# Patient Record
Sex: MALE | Race: WHITE | Employment: OTHER | URBAN - NONMETROPOLITAN AREA
[De-identification: names, ages, dates, MRNs, and addresses within clinical notes are randomized per-mention and may not be internally consistent; named-entity substitution may affect disease eponyms.]

---

## 2017-01-03 DIAGNOSIS — Z95.810 AICD (AUTOMATIC CARDIOVERTER/DEFIBRILLATOR) PRESENT: Primary | ICD-10-CM

## 2017-01-03 DIAGNOSIS — I47.20 VT (VENTRICULAR TACHYCARDIA): ICD-10-CM

## 2017-01-03 PROCEDURE — 93296 REM INTERROG EVL PM/IDS: CPT | Performed by: CLINICAL NURSE SPECIALIST

## 2017-01-03 PROCEDURE — 93295 DEV INTERROG REMOTE 1/2/MLT: CPT | Performed by: CLINICAL NURSE SPECIALIST

## 2017-03-16 ENCOUNTER — APPOINTMENT (OUTPATIENT)
Dept: VASCULAR LAB | Age: 77
End: 2017-03-16
Payer: MEDICARE

## 2017-04-05 DIAGNOSIS — I47.20 VT (VENTRICULAR TACHYCARDIA): ICD-10-CM

## 2017-04-05 DIAGNOSIS — Z95.810 AICD (AUTOMATIC CARDIOVERTER/DEFIBRILLATOR) PRESENT: Primary | ICD-10-CM

## 2017-04-05 PROCEDURE — 93295 DEV INTERROG REMOTE 1/2/MLT: CPT | Performed by: CLINICAL NURSE SPECIALIST

## 2017-04-05 PROCEDURE — 93296 REM INTERROG EVL PM/IDS: CPT | Performed by: CLINICAL NURSE SPECIALIST

## 2017-04-06 ENCOUNTER — APPOINTMENT (OUTPATIENT)
Dept: CT IMAGING | Age: 77
End: 2017-04-06
Payer: MEDICARE

## 2017-04-06 ENCOUNTER — HOSPITAL ENCOUNTER (EMERGENCY)
Age: 77
Discharge: HOME | End: 2017-04-06
Attending: EMERGENCY MEDICINE
Payer: MEDICARE

## 2017-04-06 ENCOUNTER — APPOINTMENT (OUTPATIENT)
Dept: GENERAL RADIOLOGY | Age: 77
End: 2017-04-06
Payer: MEDICARE

## 2017-04-06 VITALS
WEIGHT: 195 LBS | HEART RATE: 59 BPM | TEMPERATURE: 97.4 F | OXYGEN SATURATION: 93 % | HEIGHT: 72 IN | SYSTOLIC BLOOD PRESSURE: 183 MMHG | RESPIRATION RATE: 20 BRPM | DIASTOLIC BLOOD PRESSURE: 61 MMHG | BODY MASS INDEX: 26.41 KG/M2

## 2017-04-06 DIAGNOSIS — R53.83 FATIGUE, UNSPECIFIED TYPE: ICD-10-CM

## 2017-04-06 DIAGNOSIS — R55 SYNCOPE, UNSPECIFIED SYNCOPE TYPE: Primary | ICD-10-CM

## 2017-04-06 DIAGNOSIS — E86.0 DEHYDRATION: ICD-10-CM

## 2017-04-06 LAB
ALBUMIN SERPL-MCNC: 4.3 G/DL (ref 3.5–5.2)
ALP BLD-CCNC: 42 U/L (ref 40–130)
ALT SERPL-CCNC: 28 U/L (ref 5–41)
ANION GAP SERPL CALCULATED.3IONS-SCNC: 11 MMOL/L (ref 7–19)
AST SERPL-CCNC: 27 U/L (ref 5–40)
BASOPHILS ABSOLUTE: 0 K/UL (ref 0–0.2)
BASOPHILS RELATIVE PERCENT: 0.3 % (ref 0–1)
BILIRUB SERPL-MCNC: 0.4 MG/DL (ref 0.2–1.2)
BILIRUBIN URINE: NEGATIVE
BLOOD, URINE: NEGATIVE
BUN BLDV-MCNC: 23 MG/DL (ref 8–23)
CALCIUM SERPL-MCNC: 8.8 MG/DL (ref 8.8–10.2)
CHLORIDE BLD-SCNC: 98 MMOL/L (ref 98–111)
CLARITY: CLEAR
CO2: 26 MMOL/L (ref 22–29)
COLOR: YELLOW
CREAT SERPL-MCNC: 1.3 MG/DL (ref 0.5–1.2)
EOSINOPHILS ABSOLUTE: 0.1 K/UL (ref 0–0.6)
EOSINOPHILS RELATIVE PERCENT: 1.1 % (ref 0–5)
GFR NON-AFRICAN AMERICAN: 54
GLOBULIN: 2.3 G/DL
GLUCOSE BLD-MCNC: 145 MG/DL (ref 74–109)
GLUCOSE URINE: NEGATIVE MG/DL
HCT VFR BLD CALC: 36.9 % (ref 42–52)
HEMOGLOBIN: 11.7 G/DL (ref 14–18)
INR BLD: 1.07 (ref 0.88–1.18)
KETONES, URINE: NEGATIVE MG/DL
LEUKOCYTE ESTERASE, URINE: NEGATIVE
LYMPHOCYTES ABSOLUTE: 1.4 K/UL (ref 1.1–4.5)
LYMPHOCYTES RELATIVE PERCENT: 13.7 % (ref 20–40)
MCH RBC QN AUTO: 27.9 PG (ref 27–31)
MCHC RBC AUTO-ENTMCNC: 31.7 G/DL (ref 33–37)
MCV RBC AUTO: 88.1 FL (ref 80–94)
MONOCYTES ABSOLUTE: 1.1 K/UL (ref 0–0.9)
MONOCYTES RELATIVE PERCENT: 10.9 % (ref 0–10)
NEUTROPHILS ABSOLUTE: 7.5 K/UL (ref 1.5–7.5)
NEUTROPHILS RELATIVE PERCENT: 72.4 % (ref 50–65)
NITRITE, URINE: NEGATIVE
PDW BLD-RTO: 15.2 % (ref 11.5–14.5)
PERFORMED ON: NORMAL
PH UA: 6.5
PLATELET # BLD: 194 K/UL (ref 130–400)
PMV BLD AUTO: 9.8 FL (ref 7.4–10.4)
POC TROPONIN I: 0 NG/ML (ref 0–0.08)
POTASSIUM SERPL-SCNC: 4.4 MMOL/L (ref 3.5–5)
PROTEIN UA: NEGATIVE MG/DL
PROTHROMBIN TIME: 13.9 SEC (ref 12–14.6)
RBC # BLD: 4.19 M/UL (ref 4.7–6.1)
SODIUM BLD-SCNC: 135 MMOL/L (ref 136–145)
SPECIFIC GRAVITY UA: 1.02
TOTAL PROTEIN: 6.6 G/DL (ref 6.6–8.7)
TROPONIN: <0.01 NG/ML (ref 0–0.03)
UROBILINOGEN, URINE: 0.2 E.U./DL
WBC # BLD: 10.3 K/UL (ref 4.8–10.8)

## 2017-04-06 PROCEDURE — 2580000003 HC RX 258: Performed by: EMERGENCY MEDICINE

## 2017-04-06 PROCEDURE — 99284 EMERGENCY DEPT VISIT MOD MDM: CPT | Performed by: EMERGENCY MEDICINE

## 2017-04-06 PROCEDURE — 99284 EMERGENCY DEPT VISIT MOD MDM: CPT

## 2017-04-06 PROCEDURE — 85025 COMPLETE CBC W/AUTO DIFF WBC: CPT

## 2017-04-06 PROCEDURE — 36415 COLL VENOUS BLD VENIPUNCTURE: CPT

## 2017-04-06 PROCEDURE — 71010 XR CHEST PORTABLE: CPT

## 2017-04-06 PROCEDURE — 81003 URINALYSIS AUTO W/O SCOPE: CPT

## 2017-04-06 PROCEDURE — 85610 PROTHROMBIN TIME: CPT

## 2017-04-06 PROCEDURE — 70450 CT HEAD/BRAIN W/O DYE: CPT

## 2017-04-06 PROCEDURE — 84484 ASSAY OF TROPONIN QUANT: CPT

## 2017-04-06 PROCEDURE — 80053 COMPREHEN METABOLIC PANEL: CPT

## 2017-04-06 PROCEDURE — 93005 ELECTROCARDIOGRAM TRACING: CPT

## 2017-04-06 RX ORDER — 0.9 % SODIUM CHLORIDE 0.9 %
1000 INTRAVENOUS SOLUTION INTRAVENOUS ONCE
Status: COMPLETED | OUTPATIENT
Start: 2017-04-06 | End: 2017-04-06

## 2017-04-06 RX ADMIN — SODIUM CHLORIDE 1000 ML: 9 INJECTION, SOLUTION INTRAVENOUS at 15:04

## 2017-04-06 ASSESSMENT — ENCOUNTER SYMPTOMS
DIARRHEA: 0
ABDOMINAL PAIN: 0
EYES NEGATIVE: 1
WHEEZING: 0
COUGH: 0
VOMITING: 0
RHINORRHEA: 0
SHORTNESS OF BREATH: 0
NAUSEA: 1
SORE THROAT: 0
CHEST TIGHTNESS: 0

## 2017-04-11 ENCOUNTER — TELEPHONE (OUTPATIENT)
Dept: CARDIOLOGY | Age: 77
End: 2017-04-11

## 2017-04-12 LAB
EKG P AXIS: 105 DEGREES
EKG P-R INTERVAL: 220 MS
EKG Q-T INTERVAL: 514 MS
EKG QRS DURATION: 170 MS
EKG QTC CALCULATION (BAZETT): 504 MS
EKG T AXIS: 63 DEGREES

## 2017-04-20 ENCOUNTER — HOSPITAL ENCOUNTER (OUTPATIENT)
Dept: VASCULAR LAB | Age: 77
Discharge: HOME | End: 2017-04-20
Payer: MEDICARE

## 2017-04-20 ENCOUNTER — HOSPITAL ENCOUNTER (OUTPATIENT)
Dept: NEUROLOGY | Age: 77
Discharge: HOME | End: 2017-04-20
Payer: MEDICARE

## 2017-04-20 ENCOUNTER — OFFICE VISIT (OUTPATIENT)
Dept: VASCULAR SURGERY | Age: 77
End: 2017-04-20
Payer: MEDICARE

## 2017-04-20 VITALS
TEMPERATURE: 96.5 F | DIASTOLIC BLOOD PRESSURE: 80 MMHG | RESPIRATION RATE: 18 BRPM | HEART RATE: 65 BPM | SYSTOLIC BLOOD PRESSURE: 160 MMHG

## 2017-04-20 DIAGNOSIS — I65.23 BILATERAL CAROTID ARTERY STENOSIS: ICD-10-CM

## 2017-04-20 DIAGNOSIS — I70.213 ATHEROSCLEROSIS OF NATIVE ARTERY OF BOTH LOWER EXTREMITIES WITH INTERMITTENT CLAUDICATION (HCC): Primary | ICD-10-CM

## 2017-04-20 DIAGNOSIS — I70.213 ATHEROSCLEROSIS OF NATIVE ARTERY OF BOTH LOWER EXTREMITIES WITH INTERMITTENT CLAUDICATION (HCC): ICD-10-CM

## 2017-04-20 PROCEDURE — 95816 EEG AWAKE AND DROWSY: CPT

## 2017-04-20 PROCEDURE — 95816 EEG AWAKE AND DROWSY: CPT | Performed by: PSYCHIATRY & NEUROLOGY

## 2017-04-20 PROCEDURE — 93923 UPR/LXTR ART STDY 3+ LVLS: CPT

## 2017-04-20 PROCEDURE — 93880 EXTRACRANIAL BILAT STUDY: CPT

## 2017-04-20 PROCEDURE — 99212 OFFICE O/P EST SF 10 MIN: CPT | Performed by: NURSE PRACTITIONER

## 2017-04-21 PROBLEM — I65.29 CAROTID ARTERY STENOSIS: Status: ACTIVE | Noted: 2017-04-21

## 2017-04-25 ENCOUNTER — OFFICE VISIT (OUTPATIENT)
Dept: CARDIOLOGY | Age: 77
End: 2017-04-25
Payer: MEDICARE

## 2017-04-25 VITALS
DIASTOLIC BLOOD PRESSURE: 60 MMHG | SYSTOLIC BLOOD PRESSURE: 140 MMHG | HEIGHT: 72 IN | HEART RATE: 66 BPM | BODY MASS INDEX: 27.9 KG/M2 | WEIGHT: 206 LBS

## 2017-04-25 DIAGNOSIS — I25.10 CORONARY ARTERY DISEASE INVOLVING NATIVE CORONARY ARTERY OF NATIVE HEART WITHOUT ANGINA PECTORIS: Primary | ICD-10-CM

## 2017-04-25 DIAGNOSIS — K92.1 BLOOD IN STOOL: ICD-10-CM

## 2017-04-25 DIAGNOSIS — R53.83 FATIGUE, UNSPECIFIED TYPE: ICD-10-CM

## 2017-04-25 DIAGNOSIS — R06.02 SOB (SHORTNESS OF BREATH): ICD-10-CM

## 2017-04-25 DIAGNOSIS — Z92.29 HISTORY OF AMIODARONE THERAPY: ICD-10-CM

## 2017-04-25 DIAGNOSIS — D50.8 OTHER IRON DEFICIENCY ANEMIA: ICD-10-CM

## 2017-04-25 PROBLEM — D50.9 IRON DEFICIENCY ANEMIA: Status: ACTIVE | Noted: 2017-04-25

## 2017-04-25 PROCEDURE — 99214 OFFICE O/P EST MOD 30 MIN: CPT | Performed by: NURSE PRACTITIONER

## 2017-04-25 PROCEDURE — 93289 INTERROG DEVICE EVAL HEART: CPT | Performed by: NURSE PRACTITIONER

## 2017-04-26 ENCOUNTER — TELEPHONE (OUTPATIENT)
Dept: CARDIOLOGY | Age: 77
End: 2017-04-26

## 2017-05-05 ENCOUNTER — HOSPITAL ENCOUNTER (OUTPATIENT)
Dept: NUCLEAR MEDICINE | Age: 77
End: 2017-05-05
Payer: MEDICARE

## 2017-05-05 ENCOUNTER — HOSPITAL ENCOUNTER (OUTPATIENT)
Dept: NON INVASIVE DIAGNOSTICS | Age: 77
Discharge: HOME | End: 2017-05-05
Payer: MEDICARE

## 2017-05-05 ENCOUNTER — HOSPITAL ENCOUNTER (OUTPATIENT)
Dept: NUCLEAR MEDICINE | Age: 77
Discharge: HOME | End: 2017-05-05
Payer: MEDICARE

## 2017-05-05 DIAGNOSIS — R06.02 SOB (SHORTNESS OF BREATH): ICD-10-CM

## 2017-05-05 DIAGNOSIS — R53.83 FATIGUE, UNSPECIFIED TYPE: ICD-10-CM

## 2017-05-05 DIAGNOSIS — I25.10 CORONARY ARTERY DISEASE INVOLVING NATIVE CORONARY ARTERY OF NATIVE HEART WITHOUT ANGINA PECTORIS: ICD-10-CM

## 2017-05-05 PROCEDURE — 3430000000 HC RX DIAGNOSTIC RADIOPHARMACEUTICAL: Performed by: INTERNAL MEDICINE

## 2017-05-05 PROCEDURE — A9500 TC99M SESTAMIBI: HCPCS | Performed by: INTERNAL MEDICINE

## 2017-05-05 PROCEDURE — 93017 CV STRESS TEST TRACING ONLY: CPT

## 2017-05-05 PROCEDURE — 6360000002 HC RX W HCPCS: Performed by: INTERNAL MEDICINE

## 2017-05-05 PROCEDURE — 78452 HT MUSCLE IMAGE SPECT MULT: CPT

## 2017-05-05 RX ADMIN — TETRAKIS(2-METHOXYISOBUTYLISOCYANIDE)COPPER(I) TETRAFLUOROBORATE 30 MILLICURIE: 1 INJECTION, POWDER, LYOPHILIZED, FOR SOLUTION INTRAVENOUS at 11:42

## 2017-05-05 RX ADMIN — REGADENOSON 0.4 MG: 0.08 INJECTION, SOLUTION INTRAVENOUS at 11:42

## 2017-05-05 RX ADMIN — TETRAKIS(2-METHOXYISOBUTYLISOCYANIDE)COPPER(I) TETRAFLUOROBORATE 10 MILLICURIE: 1 INJECTION, POWDER, LYOPHILIZED, FOR SOLUTION INTRAVENOUS at 11:42

## 2017-05-09 ENCOUNTER — OFFICE VISIT (OUTPATIENT)
Dept: VASCULAR SURGERY | Age: 77
End: 2017-05-09
Payer: MEDICARE

## 2017-05-09 ENCOUNTER — OFFICE VISIT (OUTPATIENT)
Dept: CARDIOLOGY | Age: 77
End: 2017-05-09
Payer: MEDICARE

## 2017-05-09 ENCOUNTER — SURG/PROC ORDERS (OUTPATIENT)
Dept: CARDIOLOGY | Age: 77
End: 2017-05-09

## 2017-05-09 VITALS
SYSTOLIC BLOOD PRESSURE: 134 MMHG | HEIGHT: 72 IN | BODY MASS INDEX: 28.17 KG/M2 | WEIGHT: 208 LBS | DIASTOLIC BLOOD PRESSURE: 66 MMHG | HEART RATE: 64 BPM

## 2017-05-09 VITALS
DIASTOLIC BLOOD PRESSURE: 58 MMHG | TEMPERATURE: 98.5 F | RESPIRATION RATE: 18 BRPM | HEART RATE: 61 BPM | SYSTOLIC BLOOD PRESSURE: 138 MMHG | OXYGEN SATURATION: 91 %

## 2017-05-09 DIAGNOSIS — R93.1 ABNORMAL NUCLEAR CARDIAC IMAGING TEST: Primary | ICD-10-CM

## 2017-05-09 DIAGNOSIS — E78.00 HYPERCHOLESTEROLEMIA: ICD-10-CM

## 2017-05-09 DIAGNOSIS — R53.83 FATIGUE, UNSPECIFIED TYPE: ICD-10-CM

## 2017-05-09 DIAGNOSIS — I10 ESSENTIAL HYPERTENSION: ICD-10-CM

## 2017-05-09 DIAGNOSIS — K92.1 BLOOD IN STOOL: ICD-10-CM

## 2017-05-09 DIAGNOSIS — I48.91 ATRIAL FIBRILLATION, UNSPECIFIED TYPE (HCC): ICD-10-CM

## 2017-05-09 DIAGNOSIS — I70.229 ATHEROSCLEROSIS OF ARTERY OF EXTREMITY WITH REST PAIN (HCC): Primary | ICD-10-CM

## 2017-05-09 DIAGNOSIS — D50.8 OTHER IRON DEFICIENCY ANEMIA: ICD-10-CM

## 2017-05-09 DIAGNOSIS — I25.10 CORONARY ARTERY DISEASE INVOLVING NATIVE CORONARY ARTERY OF NATIVE HEART WITHOUT ANGINA PECTORIS: Primary | ICD-10-CM

## 2017-05-09 LAB
LV EF: 65 %
LVEF MODALITY: NORMAL

## 2017-05-09 PROCEDURE — 99212 OFFICE O/P EST SF 10 MIN: CPT | Performed by: INTERNAL MEDICINE

## 2017-05-09 PROCEDURE — 99213 OFFICE O/P EST LOW 20 MIN: CPT | Performed by: PHYSICIAN ASSISTANT

## 2017-05-09 RX ORDER — AMIODARONE HYDROCHLORIDE 200 MG/1
200 TABLET ORAL DAILY
Qty: 180 TABLET | Refills: 3 | Status: SHIPPED | OUTPATIENT
Start: 2017-05-09 | End: 2017-10-13 | Stop reason: SDUPTHER

## 2017-05-10 ENCOUNTER — TELEPHONE (OUTPATIENT)
Dept: VASCULAR SURGERY | Age: 77
End: 2017-05-10

## 2017-05-10 RX ORDER — ACETYLCYSTEINE 100 MG/ML
SOLUTION ORAL; RESPIRATORY (INHALATION)
Qty: 1 ML | Refills: 0 | OUTPATIENT
Start: 2017-05-10 | End: 2017-06-28 | Stop reason: CLARIF

## 2017-05-10 RX ORDER — SODIUM CHLORIDE 9 MG/ML
INJECTION, SOLUTION INTRAVENOUS CONTINUOUS
Status: CANCELLED | OUTPATIENT
Start: 2017-05-10

## 2017-05-15 ENCOUNTER — PREP FOR PROCEDURE (OUTPATIENT)
Dept: VASCULAR SURGERY | Age: 77
End: 2017-05-15

## 2017-05-15 ENCOUNTER — APPOINTMENT (OUTPATIENT)
Dept: GENERAL RADIOLOGY | Age: 77
End: 2017-05-15
Attending: INTERNAL MEDICINE
Payer: MEDICARE

## 2017-05-15 ENCOUNTER — HOSPITAL ENCOUNTER (OUTPATIENT)
Dept: CARDIAC CATH/INVASIVE PROCEDURES | Age: 77
Discharge: HOME | End: 2017-05-15
Attending: INTERNAL MEDICINE | Admitting: INTERNAL MEDICINE
Payer: MEDICARE

## 2017-05-15 VITALS
RESPIRATION RATE: 22 BRPM | DIASTOLIC BLOOD PRESSURE: 46 MMHG | SYSTOLIC BLOOD PRESSURE: 132 MMHG | TEMPERATURE: 97.9 F | BODY MASS INDEX: 27.09 KG/M2 | WEIGHT: 200 LBS | OXYGEN SATURATION: 92 % | HEIGHT: 72 IN | HEART RATE: 65 BPM

## 2017-05-15 DIAGNOSIS — R93.1 ABNORMAL NUCLEAR CARDIAC IMAGING TEST: ICD-10-CM

## 2017-05-15 LAB
ANION GAP SERPL CALCULATED.3IONS-SCNC: 13 MMOL/L (ref 7–19)
BUN BLDV-MCNC: 24 MG/DL (ref 8–23)
CALCIUM SERPL-MCNC: 9 MG/DL (ref 8.8–10.2)
CHLORIDE BLD-SCNC: 103 MMOL/L (ref 98–111)
CO2: 23 MMOL/L (ref 22–29)
CREAT SERPL-MCNC: 1.4 MG/DL (ref 0.5–1.2)
GFR NON-AFRICAN AMERICAN: 49
GLUCOSE BLD-MCNC: 113 MG/DL (ref 74–109)
HCT VFR BLD CALC: 37.7 % (ref 42–52)
HEMOGLOBIN: 11.8 G/DL (ref 14–18)
MCH RBC QN AUTO: 28 PG (ref 27–31)
MCHC RBC AUTO-ENTMCNC: 31.3 G/DL (ref 33–37)
MCV RBC AUTO: 89.5 FL (ref 80–94)
PDW BLD-RTO: 14.2 % (ref 11.5–14.5)
PLATELET # BLD: 235 K/UL (ref 130–400)
PMV BLD AUTO: 9.7 FL (ref 7.4–10.4)
POTASSIUM SERPL-SCNC: 4.3 MMOL/L (ref 3.5–5)
RBC # BLD: 4.21 M/UL (ref 4.7–6.1)
SODIUM BLD-SCNC: 139 MMOL/L (ref 136–145)
WBC # BLD: 13 K/UL (ref 4.8–10.8)

## 2017-05-15 PROCEDURE — 93459 L HRT ART/GRFT ANGIO: CPT | Performed by: INTERNAL MEDICINE

## 2017-05-15 PROCEDURE — 71020 XR CHEST STANDARD TWO VW: CPT

## 2017-05-15 PROCEDURE — 85027 COMPLETE CBC AUTOMATED: CPT

## 2017-05-15 PROCEDURE — 99024 POSTOP FOLLOW-UP VISIT: CPT | Performed by: INTERNAL MEDICINE

## 2017-05-15 PROCEDURE — 2580000003 HC RX 258: Performed by: INTERNAL MEDICINE

## 2017-05-15 PROCEDURE — 36415 COLL VENOUS BLD VENIPUNCTURE: CPT

## 2017-05-15 PROCEDURE — 80048 BASIC METABOLIC PNL TOTAL CA: CPT

## 2017-05-15 PROCEDURE — 6360000002 HC RX W HCPCS

## 2017-05-15 RX ORDER — SODIUM CHLORIDE 9 MG/ML
INJECTION, SOLUTION INTRAVENOUS CONTINUOUS
Status: CANCELLED | OUTPATIENT
Start: 2017-05-15

## 2017-05-15 RX ORDER — SODIUM CHLORIDE 9 MG/ML
INJECTION, SOLUTION INTRAVENOUS CONTINUOUS
Status: DISCONTINUED | OUTPATIENT
Start: 2017-05-15 | End: 2017-05-15

## 2017-05-15 RX ORDER — SODIUM CHLORIDE 9 MG/ML
INJECTION, SOLUTION INTRAVENOUS CONTINUOUS
Status: ACTIVE | OUTPATIENT
Start: 2017-05-15 | End: 2017-05-15

## 2017-05-15 RX ORDER — ASPIRIN 81 MG/1
81 TABLET ORAL ONCE
Status: CANCELLED | OUTPATIENT
Start: 2017-05-15 | End: 2017-05-15

## 2017-05-15 RX ORDER — ISOSORBIDE MONONITRATE 60 MG/1
60 TABLET, EXTENDED RELEASE ORAL DAILY
Qty: 30 TABLET | Refills: 3 | Status: SHIPPED | OUTPATIENT
Start: 2017-05-15 | End: 2017-09-11 | Stop reason: SDUPTHER

## 2017-05-15 RX ORDER — SODIUM CHLORIDE 0.9 % (FLUSH) 0.9 %
10 SYRINGE (ML) INJECTION PRN
Status: CANCELLED | OUTPATIENT
Start: 2017-05-15

## 2017-05-15 RX ADMIN — SODIUM CHLORIDE: 9 INJECTION, SOLUTION INTRAVENOUS at 11:17

## 2017-05-24 LAB
ALBUMIN SERPL-MCNC: 4.1 G/DL (ref 3.5–5.2)
ALP BLD-CCNC: 44 U/L (ref 40–130)
ALT SERPL-CCNC: 29 U/L (ref 5–41)
ANION GAP SERPL CALCULATED.3IONS-SCNC: 13 MMOL/L (ref 7–19)
AST SERPL-CCNC: 26 U/L (ref 5–40)
BASOPHILS ABSOLUTE: 0.1 K/UL (ref 0–0.2)
BASOPHILS RELATIVE PERCENT: 0.6 % (ref 0–1)
BILIRUB SERPL-MCNC: 0.3 MG/DL (ref 0.2–1.2)
BUN BLDV-MCNC: 25 MG/DL (ref 8–23)
CALCIUM SERPL-MCNC: 9.4 MG/DL (ref 8.8–10.2)
CHLORIDE BLD-SCNC: 105 MMOL/L (ref 98–111)
CO2: 24 MMOL/L (ref 22–29)
CREAT SERPL-MCNC: 1.4 MG/DL (ref 0.5–1.2)
EOSINOPHILS ABSOLUTE: 0.3 K/UL (ref 0–0.6)
EOSINOPHILS RELATIVE PERCENT: 2.6 % (ref 0–5)
GFR NON-AFRICAN AMERICAN: 49
GLUCOSE BLD-MCNC: 121 MG/DL (ref 74–109)
HCT VFR BLD CALC: 36.5 % (ref 42–52)
HEMOGLOBIN: 11.3 G/DL (ref 14–18)
LYMPHOCYTES ABSOLUTE: 2.1 K/UL (ref 1.1–4.5)
LYMPHOCYTES RELATIVE PERCENT: 20.9 % (ref 20–40)
MCH RBC QN AUTO: 27.6 PG (ref 27–31)
MCHC RBC AUTO-ENTMCNC: 31 G/DL (ref 33–37)
MCV RBC AUTO: 89 FL (ref 80–94)
MONOCYTES ABSOLUTE: 1.3 K/UL (ref 0–0.9)
MONOCYTES RELATIVE PERCENT: 13.3 % (ref 0–10)
NEUTROPHILS ABSOLUTE: 5.8 K/UL (ref 1.5–7.5)
NEUTROPHILS RELATIVE PERCENT: 59.1 % (ref 50–65)
PDW BLD-RTO: 14.1 % (ref 11.5–14.5)
PLATELET # BLD: 205 K/UL (ref 130–400)
PMV BLD AUTO: 9.2 FL (ref 7.4–10.4)
POTASSIUM SERPL-SCNC: 5 MMOL/L (ref 3.5–5)
RBC # BLD: 4.1 M/UL (ref 4.7–6.1)
SODIUM BLD-SCNC: 142 MMOL/L (ref 136–145)
T3 FREE: 3 PG/ML (ref 2–4.4)
T4 FREE: 1.9 NG/ML (ref 0.9–1.7)
TOTAL PROTEIN: 7.1 G/DL (ref 6.6–8.7)
TSH SERPL DL<=0.05 MIU/L-ACNC: 0.14 UIU/ML (ref 0.27–4.2)
WBC # BLD: 9.8 K/UL (ref 4.8–10.8)

## 2017-05-26 LAB — THYROID PEROXIDASE (TPO) ABS: 0.3 IU/ML (ref 0–9)

## 2017-06-02 RX ORDER — SODIUM CHLORIDE 9 MG/ML
INJECTION, SOLUTION INTRAVENOUS CONTINUOUS
Status: CANCELLED | OUTPATIENT
Start: 2017-06-02

## 2017-06-02 RX ORDER — SODIUM CHLORIDE 0.9 % (FLUSH) 0.9 %
10 SYRINGE (ML) INJECTION PRN
Status: CANCELLED | OUTPATIENT
Start: 2017-06-02

## 2017-06-02 RX ORDER — ASPIRIN 81 MG/1
81 TABLET ORAL ONCE
Status: CANCELLED | OUTPATIENT
Start: 2017-06-02 | End: 2017-06-02

## 2017-06-05 ENCOUNTER — HOSPITAL ENCOUNTER (OUTPATIENT)
Dept: INTERVENTIONAL RADIOLOGY/VASCULAR | Age: 77
Setting detail: OBSERVATION
Discharge: HOME | End: 2017-06-06
Attending: SURGERY | Admitting: SURGERY
Payer: MEDICARE

## 2017-06-05 DIAGNOSIS — I70.213 ATHEROSCLEROSIS OF NATIVE ARTERY OF BOTH LOWER EXTREMITIES WITH INTERMITTENT CLAUDICATION (HCC): ICD-10-CM

## 2017-06-05 LAB
HCT VFR BLD CALC: 33.7 % (ref 42–52)
HEMOGLOBIN: 10.6 G/DL (ref 14–18)

## 2017-06-05 PROCEDURE — 2500000003 HC RX 250 WO HCPCS: Performed by: SURGERY

## 2017-06-05 PROCEDURE — G0378 HOSPITAL OBSERVATION PER HR: HCPCS

## 2017-06-05 PROCEDURE — 75716 ARTERY X-RAYS ARMS/LEGS: CPT | Performed by: SURGERY

## 2017-06-05 PROCEDURE — 94640 AIRWAY INHALATION TREATMENT: CPT

## 2017-06-05 PROCEDURE — 6370000000 HC RX 637 (ALT 250 FOR IP): Performed by: SURGERY

## 2017-06-05 PROCEDURE — 6360000004 HC RX CONTRAST MEDICATION: Performed by: SURGERY

## 2017-06-05 PROCEDURE — C1769 GUIDE WIRE: HCPCS

## 2017-06-05 PROCEDURE — 85014 HEMATOCRIT: CPT

## 2017-06-05 PROCEDURE — 6360000002 HC RX W HCPCS: Performed by: SURGERY

## 2017-06-05 PROCEDURE — 93922 UPR/L XTREMITY ART 2 LEVELS: CPT

## 2017-06-05 PROCEDURE — 93005 ELECTROCARDIOGRAM TRACING: CPT

## 2017-06-05 PROCEDURE — 36415 COLL VENOUS BLD VENIPUNCTURE: CPT

## 2017-06-05 PROCEDURE — 75625 CONTRAST EXAM ABDOMINL AORTA: CPT | Performed by: SURGERY

## 2017-06-05 PROCEDURE — 37225 HC ATHERECTOMY FEM/POP: CPT | Performed by: SURGERY

## 2017-06-05 PROCEDURE — 2580000003 HC RX 258: Performed by: SURGERY

## 2017-06-05 PROCEDURE — 85018 HEMOGLOBIN: CPT

## 2017-06-05 PROCEDURE — 37225 PR REVSC OPN/PRQ FEM/POP W/ATHRC/ANGIOP SM VSL: CPT | Performed by: SURGERY

## 2017-06-05 RX ORDER — VALSARTAN 160 MG/1
160 TABLET ORAL NIGHTLY
Status: DISCONTINUED | OUTPATIENT
Start: 2017-06-05 | End: 2017-06-06 | Stop reason: HOSPADM

## 2017-06-05 RX ORDER — ASPIRIN 81 MG/1
81 TABLET ORAL ONCE
Status: DISCONTINUED | OUTPATIENT
Start: 2017-06-05 | End: 2017-06-05 | Stop reason: SDUPTHER

## 2017-06-05 RX ORDER — ONDANSETRON 2 MG/ML
4 INJECTION INTRAMUSCULAR; INTRAVENOUS EVERY 8 HOURS PRN
Status: DISCONTINUED | OUTPATIENT
Start: 2017-06-05 | End: 2017-06-06 | Stop reason: HOSPADM

## 2017-06-05 RX ORDER — ISOSORBIDE MONONITRATE 60 MG/1
60 TABLET, EXTENDED RELEASE ORAL DAILY
Status: DISCONTINUED | OUTPATIENT
Start: 2017-06-06 | End: 2017-06-06 | Stop reason: HOSPADM

## 2017-06-05 RX ORDER — MIDAZOLAM HYDROCHLORIDE 1 MG/ML
INJECTION INTRAMUSCULAR; INTRAVENOUS
Status: COMPLETED | OUTPATIENT
Start: 2017-06-05 | End: 2017-06-05

## 2017-06-05 RX ORDER — CLONIDINE HYDROCHLORIDE 0.1 MG/1
0.1 TABLET ORAL
Status: COMPLETED | OUTPATIENT
Start: 2017-06-05 | End: 2017-06-05

## 2017-06-05 RX ORDER — SODIUM CHLORIDE 0.9 % (FLUSH) 0.9 %
10 SYRINGE (ML) INJECTION PRN
Status: DISCONTINUED | OUTPATIENT
Start: 2017-06-05 | End: 2017-06-06 | Stop reason: HOSPADM

## 2017-06-05 RX ORDER — HYDROCODONE BITARTRATE AND ACETAMINOPHEN 5; 325 MG/1; MG/1
2 TABLET ORAL EVERY 4 HOURS PRN
Status: DISCONTINUED | OUTPATIENT
Start: 2017-06-05 | End: 2017-06-06 | Stop reason: HOSPADM

## 2017-06-05 RX ORDER — FERROUS SULFATE 325(65) MG
325 TABLET ORAL
COMMUNITY
End: 2017-10-13 | Stop reason: ALTCHOICE

## 2017-06-05 RX ORDER — HEPARIN SODIUM 5000 [USP'U]/ML
INJECTION, SOLUTION INTRAVENOUS; SUBCUTANEOUS
Status: COMPLETED | OUTPATIENT
Start: 2017-06-05 | End: 2017-06-05

## 2017-06-05 RX ORDER — IODIXANOL 320 MG/ML
INJECTION, SOLUTION INTRAVASCULAR
Status: COMPLETED | OUTPATIENT
Start: 2017-06-05 | End: 2017-06-05

## 2017-06-05 RX ORDER — ALBUTEROL SULFATE 90 UG/1
2 AEROSOL, METERED RESPIRATORY (INHALATION) EVERY 6 HOURS PRN
Status: DISCONTINUED | OUTPATIENT
Start: 2017-06-05 | End: 2017-06-06 | Stop reason: HOSPADM

## 2017-06-05 RX ORDER — ASPIRIN 81 MG/1
81 TABLET ORAL ONCE
Status: COMPLETED | OUTPATIENT
Start: 2017-06-05 | End: 2017-06-05

## 2017-06-05 RX ORDER — ACETAMINOPHEN 325 MG/1
650 TABLET ORAL EVERY 4 HOURS PRN
Status: DISCONTINUED | OUTPATIENT
Start: 2017-06-05 | End: 2017-06-06 | Stop reason: HOSPADM

## 2017-06-05 RX ORDER — VITAMIN B COMPLEX
1 CAPSULE ORAL DAILY
COMMUNITY
End: 2017-12-12

## 2017-06-05 RX ORDER — HYDROCODONE BITARTRATE AND ACETAMINOPHEN 5; 325 MG/1; MG/1
1 TABLET ORAL EVERY 4 HOURS PRN
Status: DISCONTINUED | OUTPATIENT
Start: 2017-06-05 | End: 2017-06-06 | Stop reason: HOSPADM

## 2017-06-05 RX ORDER — LIDOCAINE HYDROCHLORIDE 20 MG/ML
INJECTION, SOLUTION INFILTRATION; PERINEURAL
Status: COMPLETED | OUTPATIENT
Start: 2017-06-05 | End: 2017-06-05

## 2017-06-05 RX ORDER — ALBUTEROL SULFATE 2.5 MG/3ML
2.5 SOLUTION RESPIRATORY (INHALATION) EVERY 6 HOURS PRN
Status: DISCONTINUED | OUTPATIENT
Start: 2017-06-05 | End: 2017-06-06 | Stop reason: HOSPADM

## 2017-06-05 RX ORDER — SODIUM CHLORIDE 9 MG/ML
INJECTION, SOLUTION INTRAVENOUS CONTINUOUS
Status: DISCONTINUED | OUTPATIENT
Start: 2017-06-05 | End: 2017-06-06 | Stop reason: HOSPADM

## 2017-06-05 RX ORDER — SODIUM CHLORIDE 9 MG/ML
INJECTION, SOLUTION INTRAVENOUS CONTINUOUS
Status: DISCONTINUED | OUTPATIENT
Start: 2017-06-05 | End: 2017-06-05 | Stop reason: SDUPTHER

## 2017-06-05 RX ORDER — PREDNISONE 10 MG/1
5 TABLET ORAL EVERY OTHER DAY
Status: DISCONTINUED | OUTPATIENT
Start: 2017-06-06 | End: 2017-06-06 | Stop reason: HOSPADM

## 2017-06-05 RX ORDER — SODIUM CHLORIDE 0.9 % (FLUSH) 0.9 %
10 SYRINGE (ML) INJECTION PRN
Status: DISCONTINUED | OUTPATIENT
Start: 2017-06-05 | End: 2017-06-05 | Stop reason: SDUPTHER

## 2017-06-05 RX ORDER — ACETYLCYSTEINE 200 MG/ML
3 SOLUTION ORAL; RESPIRATORY (INHALATION) ONCE
Status: COMPLETED | OUTPATIENT
Start: 2017-06-06 | End: 2017-06-06

## 2017-06-05 RX ORDER — NITROGLYCERIN 20 MG/100ML
INJECTION INTRAVENOUS CONTINUOUS PRN
Status: COMPLETED | OUTPATIENT
Start: 2017-06-05 | End: 2017-06-05

## 2017-06-05 RX ORDER — VERAPAMIL HYDROCHLORIDE 2.5 MG/ML
INJECTION, SOLUTION INTRAVENOUS
Status: COMPLETED | OUTPATIENT
Start: 2017-06-05 | End: 2017-06-05

## 2017-06-05 RX ORDER — ATORVASTATIN CALCIUM 40 MG/1
40 TABLET, FILM COATED ORAL DAILY
Status: DISCONTINUED | OUTPATIENT
Start: 2017-06-06 | End: 2017-06-06 | Stop reason: HOSPADM

## 2017-06-05 RX ORDER — FENTANYL CITRATE 50 UG/ML
INJECTION, SOLUTION INTRAMUSCULAR; INTRAVENOUS
Status: COMPLETED | OUTPATIENT
Start: 2017-06-05 | End: 2017-06-05

## 2017-06-05 RX ORDER — AMIODARONE HYDROCHLORIDE 200 MG/1
200 TABLET ORAL DAILY
Status: DISCONTINUED | OUTPATIENT
Start: 2017-06-06 | End: 2017-06-06 | Stop reason: HOSPADM

## 2017-06-05 RX ORDER — FERROUS SULFATE 325(65) MG
325 TABLET ORAL
Status: DISCONTINUED | OUTPATIENT
Start: 2017-06-06 | End: 2017-06-06 | Stop reason: HOSPADM

## 2017-06-05 RX ORDER — VITAMIN C
1 TAB ORAL DAILY
Status: DISCONTINUED | OUTPATIENT
Start: 2017-06-06 | End: 2017-06-06 | Stop reason: HOSPADM

## 2017-06-05 RX ORDER — PREDNISONE 1 MG/1
5 TABLET ORAL EVERY OTHER DAY
COMMUNITY
End: 2017-07-06 | Stop reason: SDUPTHER

## 2017-06-05 RX ADMIN — LIDOCAINE HYDROCHLORIDE 10 ML: 20 INJECTION, SOLUTION INFILTRATION; PERINEURAL at 09:24

## 2017-06-05 RX ADMIN — IODIXANOL 175 ML: 320 INJECTION, SOLUTION INTRAVASCULAR at 10:57

## 2017-06-05 RX ADMIN — SODIUM CHLORIDE: 9 INJECTION, SOLUTION INTRAVENOUS at 07:40

## 2017-06-05 RX ADMIN — MIDAZOLAM HYDROCHLORIDE 0.5 MG: 1 INJECTION, SOLUTION INTRAMUSCULAR; INTRAVENOUS at 10:56

## 2017-06-05 RX ADMIN — MIDAZOLAM HYDROCHLORIDE 0.5 MG: 1 INJECTION, SOLUTION INTRAMUSCULAR; INTRAVENOUS at 09:23

## 2017-06-05 RX ADMIN — MIDAZOLAM HYDROCHLORIDE 1 MG: 1 INJECTION, SOLUTION INTRAMUSCULAR; INTRAVENOUS at 09:49

## 2017-06-05 RX ADMIN — HEPARIN SODIUM 6000 UNITS: 5000 INJECTION, SOLUTION INTRAVENOUS; SUBCUTANEOUS at 09:40

## 2017-06-05 RX ADMIN — FENTANYL CITRATE 50 MCG: 50 INJECTION INTRAMUSCULAR; INTRAVENOUS at 10:25

## 2017-06-05 RX ADMIN — ASPIRIN 81 MG: 81 TABLET, COATED ORAL at 07:45

## 2017-06-05 RX ADMIN — HEPARIN SODIUM 5000 UNITS: 5000 INJECTION, SOLUTION INTRAVENOUS; SUBCUTANEOUS at 09:23

## 2017-06-05 RX ADMIN — FENTANYL CITRATE 25 MCG: 50 INJECTION INTRAMUSCULAR; INTRAVENOUS at 09:23

## 2017-06-05 RX ADMIN — MIDAZOLAM HYDROCHLORIDE 0.5 MG: 1 INJECTION, SOLUTION INTRAMUSCULAR; INTRAVENOUS at 09:25

## 2017-06-05 RX ADMIN — NITROGLYCERIN 5 MCG: 20 INJECTION INTRAVENOUS at 09:51

## 2017-06-05 RX ADMIN — VERAPAMIL HYDROCHLORIDE 5 MG: 2.5 INJECTION, SOLUTION INTRAVENOUS at 09:50

## 2017-06-05 RX ADMIN — ALBUTEROL SULFATE 2.5 MG: 2.5 SOLUTION RESPIRATORY (INHALATION) at 22:30

## 2017-06-05 RX ADMIN — CLONIDINE HYDROCHLORIDE 0.1 MG: 0.1 TABLET ORAL at 13:45

## 2017-06-05 RX ADMIN — Medication 2 G: at 09:16

## 2017-06-05 RX ADMIN — SODIUM CHLORIDE: 9 INJECTION, SOLUTION INTRAVENOUS at 17:45

## 2017-06-05 RX ADMIN — FENTANYL CITRATE 25 MCG: 50 INJECTION INTRAMUSCULAR; INTRAVENOUS at 09:49

## 2017-06-05 RX ADMIN — MIDAZOLAM HYDROCHLORIDE 0.5 MG: 1 INJECTION, SOLUTION INTRAMUSCULAR; INTRAVENOUS at 10:38

## 2017-06-05 RX ADMIN — SODIUM BICARBONATE: 84 INJECTION INTRAVENOUS at 11:33

## 2017-06-06 VITALS
WEIGHT: 247 LBS | OXYGEN SATURATION: 93 % | RESPIRATION RATE: 16 BRPM | HEIGHT: 72 IN | SYSTOLIC BLOOD PRESSURE: 127 MMHG | DIASTOLIC BLOOD PRESSURE: 58 MMHG | HEART RATE: 70 BPM | TEMPERATURE: 98 F | BODY MASS INDEX: 33.46 KG/M2

## 2017-06-06 PROCEDURE — G0378 HOSPITAL OBSERVATION PER HR: HCPCS

## 2017-06-06 PROCEDURE — 99217 PR OBSERVATION CARE DISCHARGE MANAGEMENT: CPT | Performed by: SURGERY

## 2017-06-06 PROCEDURE — 6360000002 HC RX W HCPCS: Performed by: SURGERY

## 2017-06-06 RX ADMIN — ACETYLCYSTEINE 600 MG: 200 SOLUTION ORAL; RESPIRATORY (INHALATION) at 08:52

## 2017-06-07 LAB
EKG P AXIS: 81 DEGREES
EKG P-R INTERVAL: 206 MS
EKG Q-T INTERVAL: 482 MS
EKG QRS DURATION: 152 MS
EKG QTC CALCULATION (BAZETT): 475 MS
EKG T AXIS: 60 DEGREES

## 2017-06-08 ENCOUNTER — TELEPHONE (OUTPATIENT)
Dept: INTERNAL MEDICINE | Age: 77
End: 2017-06-08

## 2017-06-13 ENCOUNTER — OFFICE VISIT (OUTPATIENT)
Dept: INTERNAL MEDICINE | Age: 77
End: 2017-06-13
Payer: MEDICARE

## 2017-06-13 VITALS
SYSTOLIC BLOOD PRESSURE: 132 MMHG | BODY MASS INDEX: 27.36 KG/M2 | OXYGEN SATURATION: 94 % | TEMPERATURE: 97.6 F | HEART RATE: 64 BPM | DIASTOLIC BLOOD PRESSURE: 84 MMHG | WEIGHT: 202 LBS | HEIGHT: 72 IN

## 2017-06-13 DIAGNOSIS — J44.9 CHRONIC OBSTRUCTIVE PULMONARY DISEASE, UNSPECIFIED COPD TYPE (HCC): ICD-10-CM

## 2017-06-13 DIAGNOSIS — R53.83 FATIGUE, UNSPECIFIED TYPE: ICD-10-CM

## 2017-06-13 DIAGNOSIS — K92.1 BLOOD IN STOOL: ICD-10-CM

## 2017-06-13 DIAGNOSIS — D50.0 IRON DEFICIENCY ANEMIA DUE TO CHRONIC BLOOD LOSS: ICD-10-CM

## 2017-06-13 DIAGNOSIS — E07.9 DISORDER OF THYROID: ICD-10-CM

## 2017-06-13 DIAGNOSIS — R06.02 SOB (SHORTNESS OF BREATH): ICD-10-CM

## 2017-06-13 DIAGNOSIS — D50.0 IRON DEFICIENCY ANEMIA DUE TO CHRONIC BLOOD LOSS: Primary | ICD-10-CM

## 2017-06-13 DIAGNOSIS — Z92.29 HISTORY OF AMIODARONE THERAPY: ICD-10-CM

## 2017-06-13 DIAGNOSIS — I25.10 CORONARY ARTERY DISEASE INVOLVING NATIVE CORONARY ARTERY OF NATIVE HEART WITHOUT ANGINA PECTORIS: ICD-10-CM

## 2017-06-13 LAB
HCT VFR BLD CALC: 34.8 % (ref 42–52)
HEMOGLOBIN: 10.9 G/DL (ref 14–18)
IRON: 24 UG/DL (ref 59–158)
MCH RBC QN AUTO: 27.2 PG (ref 27–31)
MCHC RBC AUTO-ENTMCNC: 31.3 G/DL (ref 33–37)
MCV RBC AUTO: 86.8 FL (ref 80–94)
PDW BLD-RTO: 14.2 % (ref 11.5–14.5)
PLATELET # BLD: 223 K/UL (ref 130–400)
PMV BLD AUTO: 9.7 FL (ref 9.4–12.4)
RBC # BLD: 4.01 M/UL (ref 4.7–6.1)
TSH SERPL DL<=0.05 MIU/L-ACNC: 0.3 UIU/ML (ref 0.27–4.2)
WBC # BLD: 15 K/UL (ref 4.8–10.8)

## 2017-06-13 PROCEDURE — 99214 OFFICE O/P EST MOD 30 MIN: CPT | Performed by: NURSE PRACTITIONER

## 2017-06-13 RX ORDER — DOXYCYCLINE HYCLATE 100 MG/1
CAPSULE ORAL
COMMUNITY
Start: 2017-05-09 | End: 2017-09-11 | Stop reason: ALTCHOICE

## 2017-06-13 RX ORDER — PREDNISONE 20 MG/1
TABLET ORAL
COMMUNITY
Start: 2017-05-09 | End: 2017-07-06 | Stop reason: DRUGHIGH

## 2017-06-13 ASSESSMENT — ENCOUNTER SYMPTOMS
WHEEZING: 1
ROS SKIN COMMENTS: BRUISES EASILY
COUGH: 1
SHORTNESS OF BREATH: 1

## 2017-06-14 ENCOUNTER — TELEPHONE (OUTPATIENT)
Dept: INTERNAL MEDICINE | Age: 77
End: 2017-06-14

## 2017-06-15 ENCOUNTER — HOSPITAL ENCOUNTER (OUTPATIENT)
Dept: INFUSION THERAPY | Age: 77
Setting detail: INFUSION SERIES
Discharge: HOME | End: 2017-06-15
Payer: MEDICARE

## 2017-06-15 VITALS
SYSTOLIC BLOOD PRESSURE: 148 MMHG | RESPIRATION RATE: 18 BRPM | HEART RATE: 64 BPM | TEMPERATURE: 98.2 F | DIASTOLIC BLOOD PRESSURE: 62 MMHG | OXYGEN SATURATION: 97 %

## 2017-06-15 PROCEDURE — 6360000002 HC RX W HCPCS: Performed by: NURSE PRACTITIONER

## 2017-06-15 PROCEDURE — 96365 THER/PROPH/DIAG IV INF INIT: CPT

## 2017-06-15 PROCEDURE — 2580000003 HC RX 258: Performed by: NURSE PRACTITIONER

## 2017-06-15 RX ADMIN — IRON SUCROSE 200 MG: 20 INJECTION, SOLUTION INTRAVENOUS at 13:24

## 2017-06-20 ENCOUNTER — HOSPITAL ENCOUNTER (OUTPATIENT)
Dept: INFUSION THERAPY | Age: 77
Setting detail: INFUSION SERIES
Discharge: HOME | End: 2017-06-20
Payer: MEDICARE

## 2017-06-20 VITALS
DIASTOLIC BLOOD PRESSURE: 70 MMHG | TEMPERATURE: 98.1 F | RESPIRATION RATE: 17 BRPM | OXYGEN SATURATION: 98 % | SYSTOLIC BLOOD PRESSURE: 181 MMHG | HEART RATE: 65 BPM

## 2017-06-20 PROCEDURE — 6360000002 HC RX W HCPCS: Performed by: NURSE PRACTITIONER

## 2017-06-20 PROCEDURE — 2580000003 HC RX 258: Performed by: NURSE PRACTITIONER

## 2017-06-20 PROCEDURE — 96365 THER/PROPH/DIAG IV INF INIT: CPT

## 2017-06-20 RX ADMIN — IRON SUCROSE 200 MG: 20 INJECTION, SOLUTION INTRAVENOUS at 13:42

## 2017-06-22 ENCOUNTER — HOSPITAL ENCOUNTER (OUTPATIENT)
Dept: INFUSION THERAPY | Age: 77
Setting detail: INFUSION SERIES
Discharge: HOME | End: 2017-06-22
Payer: MEDICARE

## 2017-06-22 VITALS
TEMPERATURE: 98.4 F | SYSTOLIC BLOOD PRESSURE: 172 MMHG | DIASTOLIC BLOOD PRESSURE: 72 MMHG | RESPIRATION RATE: 17 BRPM | OXYGEN SATURATION: 95 % | HEART RATE: 64 BPM

## 2017-06-22 PROCEDURE — 96365 THER/PROPH/DIAG IV INF INIT: CPT

## 2017-06-22 PROCEDURE — 2580000003 HC RX 258: Performed by: NURSE PRACTITIONER

## 2017-06-22 PROCEDURE — 6360000002 HC RX W HCPCS: Performed by: NURSE PRACTITIONER

## 2017-06-22 RX ADMIN — IRON SUCROSE 200 MG: 20 INJECTION, SOLUTION INTRAVENOUS at 10:09

## 2017-06-26 ENCOUNTER — HOSPITAL ENCOUNTER (OUTPATIENT)
Dept: INFUSION THERAPY | Age: 77
Setting detail: INFUSION SERIES
Discharge: HOME | End: 2017-06-26
Payer: MEDICARE

## 2017-06-26 ENCOUNTER — TELEPHONE (OUTPATIENT)
Dept: INTERNAL MEDICINE | Age: 77
End: 2017-06-26

## 2017-06-26 VITALS
SYSTOLIC BLOOD PRESSURE: 148 MMHG | RESPIRATION RATE: 18 BRPM | HEART RATE: 66 BPM | TEMPERATURE: 97.7 F | DIASTOLIC BLOOD PRESSURE: 80 MMHG

## 2017-06-26 PROCEDURE — 96365 THER/PROPH/DIAG IV INF INIT: CPT

## 2017-06-26 PROCEDURE — 6360000002 HC RX W HCPCS: Performed by: NURSE PRACTITIONER

## 2017-06-26 PROCEDURE — 2580000003 HC RX 258: Performed by: NURSE PRACTITIONER

## 2017-06-26 RX ADMIN — IRON SUCROSE 200 MG: 20 INJECTION, SOLUTION INTRAVENOUS at 12:28

## 2017-06-28 ENCOUNTER — HOSPITAL ENCOUNTER (OUTPATIENT)
Dept: INFUSION THERAPY | Age: 77
Setting detail: INFUSION SERIES
Discharge: HOME | End: 2017-06-28
Payer: MEDICARE

## 2017-06-28 ENCOUNTER — OFFICE VISIT (OUTPATIENT)
Dept: VASCULAR SURGERY | Age: 77
End: 2017-06-28
Payer: MEDICARE

## 2017-06-28 VITALS
HEART RATE: 55 BPM | RESPIRATION RATE: 17 BRPM | TEMPERATURE: 98 F | SYSTOLIC BLOOD PRESSURE: 167 MMHG | DIASTOLIC BLOOD PRESSURE: 68 MMHG | OXYGEN SATURATION: 93 %

## 2017-06-28 VITALS
RESPIRATION RATE: 18 BRPM | TEMPERATURE: 96.6 F | HEART RATE: 60 BPM | DIASTOLIC BLOOD PRESSURE: 72 MMHG | SYSTOLIC BLOOD PRESSURE: 170 MMHG

## 2017-06-28 DIAGNOSIS — I73.9 PVD (PERIPHERAL VASCULAR DISEASE) WITH CLAUDICATION (HCC): Primary | ICD-10-CM

## 2017-06-28 PROCEDURE — 2580000003 HC RX 258: Performed by: NURSE PRACTITIONER

## 2017-06-28 PROCEDURE — 6360000002 HC RX W HCPCS: Performed by: NURSE PRACTITIONER

## 2017-06-28 PROCEDURE — 96365 THER/PROPH/DIAG IV INF INIT: CPT

## 2017-06-28 PROCEDURE — 99212 OFFICE O/P EST SF 10 MIN: CPT | Performed by: PHYSICIAN ASSISTANT

## 2017-06-28 RX ADMIN — IRON SUCROSE 200 MG: 20 INJECTION, SOLUTION INTRAVENOUS at 13:13

## 2017-07-05 DIAGNOSIS — D50.0 IRON DEFICIENCY ANEMIA DUE TO CHRONIC BLOOD LOSS: ICD-10-CM

## 2017-07-05 LAB — IRON: 112 UG/DL (ref 59–158)

## 2017-07-06 RX ORDER — PREDNISONE 1 MG/1
5 TABLET ORAL EVERY OTHER DAY
Qty: 45 TABLET | Refills: 3 | Status: SHIPPED | OUTPATIENT
Start: 2017-07-06 | End: 2017-09-11 | Stop reason: ALTCHOICE

## 2017-07-11 ENCOUNTER — OFFICE VISIT (OUTPATIENT)
Dept: CARDIOLOGY | Age: 77
End: 2017-07-11
Payer: MEDICARE

## 2017-07-11 VITALS
HEIGHT: 72 IN | BODY MASS INDEX: 27.09 KG/M2 | DIASTOLIC BLOOD PRESSURE: 70 MMHG | HEART RATE: 61 BPM | SYSTOLIC BLOOD PRESSURE: 130 MMHG | WEIGHT: 200 LBS

## 2017-07-11 DIAGNOSIS — I10 ESSENTIAL HYPERTENSION: ICD-10-CM

## 2017-07-11 DIAGNOSIS — E78.00 HYPERCHOLESTEROLEMIA: ICD-10-CM

## 2017-07-11 DIAGNOSIS — I25.10 CORONARY ARTERY DISEASE INVOLVING NATIVE CORONARY ARTERY OF NATIVE HEART WITHOUT ANGINA PECTORIS: Primary | ICD-10-CM

## 2017-07-11 DIAGNOSIS — Z95.810 AICD (AUTOMATIC CARDIOVERTER/DEFIBRILLATOR) PRESENT: ICD-10-CM

## 2017-07-11 PROCEDURE — 93282 PRGRMG EVAL IMPLANTABLE DFB: CPT | Performed by: INTERNAL MEDICINE

## 2017-07-11 PROCEDURE — 99212 OFFICE O/P EST SF 10 MIN: CPT | Performed by: INTERNAL MEDICINE

## 2017-07-28 ENCOUNTER — TELEPHONE (OUTPATIENT)
Dept: INTERNAL MEDICINE | Age: 77
End: 2017-07-28

## 2017-08-04 ENCOUNTER — HOSPITAL ENCOUNTER (OUTPATIENT)
Dept: CT IMAGING | Age: 77
Discharge: HOME | End: 2017-08-04
Payer: MEDICARE

## 2017-08-04 ENCOUNTER — OFFICE VISIT (OUTPATIENT)
Dept: INTERNAL MEDICINE | Age: 77
End: 2017-08-04
Payer: MEDICARE

## 2017-08-04 VITALS
TEMPERATURE: 97.4 F | OXYGEN SATURATION: 89 % | SYSTOLIC BLOOD PRESSURE: 136 MMHG | HEART RATE: 88 BPM | BODY MASS INDEX: 27.09 KG/M2 | HEIGHT: 72 IN | DIASTOLIC BLOOD PRESSURE: 78 MMHG | WEIGHT: 200 LBS

## 2017-08-04 DIAGNOSIS — M54.50 ACUTE BILATERAL LOW BACK PAIN WITHOUT SCIATICA: ICD-10-CM

## 2017-08-04 DIAGNOSIS — M54.50 ACUTE BILATERAL LOW BACK PAIN WITHOUT SCIATICA: Primary | ICD-10-CM

## 2017-08-04 DIAGNOSIS — N18.30 CHRONIC KIDNEY DISEASE (CKD), STAGE III (MODERATE) (HCC): ICD-10-CM

## 2017-08-04 DIAGNOSIS — Z95.1 S/P CABG X 6: ICD-10-CM

## 2017-08-04 DIAGNOSIS — I25.10 CORONARY ARTERY DISEASE INVOLVING NATIVE CORONARY ARTERY OF NATIVE HEART WITHOUT ANGINA PECTORIS: ICD-10-CM

## 2017-08-04 DIAGNOSIS — J44.9 CHRONIC OBSTRUCTIVE PULMONARY DISEASE, UNSPECIFIED COPD TYPE (HCC): ICD-10-CM

## 2017-08-04 DIAGNOSIS — R53.83 FATIGUE, UNSPECIFIED TYPE: ICD-10-CM

## 2017-08-04 DIAGNOSIS — I47.20 VENTRICULAR TACHYARRHYTHMIA: ICD-10-CM

## 2017-08-04 DIAGNOSIS — S32.000A LUMBAR COMPRESSION FRACTURE, CLOSED, INITIAL ENCOUNTER (HCC): ICD-10-CM

## 2017-08-04 DIAGNOSIS — D50.0 IRON DEFICIENCY ANEMIA DUE TO CHRONIC BLOOD LOSS: ICD-10-CM

## 2017-08-04 PROBLEM — K92.1 BLOOD IN STOOL: Status: RESOLVED | Noted: 2017-04-25 | Resolved: 2017-08-04

## 2017-08-04 PROCEDURE — 72131 CT LUMBAR SPINE W/O DYE: CPT

## 2017-08-04 PROCEDURE — 99214 OFFICE O/P EST MOD 30 MIN: CPT | Performed by: NURSE PRACTITIONER

## 2017-08-04 RX ORDER — HYDROCODONE BITARTRATE AND ACETAMINOPHEN 7.5; 325 MG/1; MG/1
TABLET ORAL
COMMUNITY
Start: 2017-07-31 | End: 2017-08-04 | Stop reason: DRUGHIGH

## 2017-08-04 RX ORDER — TIZANIDINE HYDROCHLORIDE 2 MG/1
CAPSULE, GELATIN COATED ORAL
COMMUNITY
Start: 2017-07-29 | End: 2017-09-22 | Stop reason: ALTCHOICE

## 2017-08-04 RX ORDER — FUROSEMIDE 40 MG/1
TABLET ORAL
COMMUNITY
Start: 2017-08-02 | End: 2017-10-13 | Stop reason: ALTCHOICE

## 2017-08-04 RX ORDER — HYDROCODONE BITARTRATE AND ACETAMINOPHEN 7.5; 325 MG/1; MG/1
TABLET ORAL
Qty: 30 TABLET | Refills: 0 | Status: SHIPPED | OUTPATIENT
Start: 2017-08-04 | End: 2017-08-09 | Stop reason: SDUPTHER

## 2017-08-04 ASSESSMENT — ENCOUNTER SYMPTOMS
VOMITING: 0
TROUBLE SWALLOWING: 0
EYE ITCHING: 0
CONSTIPATION: 0
CHOKING: 0
COLOR CHANGE: 0
SHORTNESS OF BREATH: 1
ABDOMINAL DISTENTION: 0
EYE DISCHARGE: 0
NAUSEA: 0
BACK PAIN: 1
STRIDOR: 0
BLOOD IN STOOL: 0
ABDOMINAL PAIN: 0
SORE THROAT: 0
DIARRHEA: 0
COUGH: 0
WHEEZING: 0

## 2017-08-08 DIAGNOSIS — S32.000A COMPRESSION OF LUMBAR VERTEBRA, CLOSED, INITIAL ENCOUNTER (HCC): Primary | ICD-10-CM

## 2017-08-09 RX ORDER — HYDROCODONE BITARTRATE AND ACETAMINOPHEN 7.5; 325 MG/1; MG/1
TABLET ORAL
Qty: 30 TABLET | Refills: 0 | Status: SHIPPED | OUTPATIENT
Start: 2017-08-09 | End: 2017-09-11 | Stop reason: ALTCHOICE

## 2017-08-24 ENCOUNTER — TELEPHONE (OUTPATIENT)
Dept: NEUROSURGERY | Age: 77
End: 2017-08-24

## 2017-09-07 ENCOUNTER — TELEPHONE (OUTPATIENT)
Dept: INTERNAL MEDICINE | Age: 77
End: 2017-09-07

## 2017-09-07 ENCOUNTER — HOSPITAL ENCOUNTER (OUTPATIENT)
Dept: CT IMAGING | Age: 77
Discharge: HOME | End: 2017-09-07
Payer: MEDICARE

## 2017-09-07 DIAGNOSIS — S32.10XA CLOSED FRACTURE OF SACRUM AND COCCYX WITH COMPLETE CAUDA EQUINA LESION, INITIAL ENCOUNTER (HCC): ICD-10-CM

## 2017-09-07 DIAGNOSIS — I10 ESSENTIAL HYPERTENSION, BENIGN: ICD-10-CM

## 2017-09-07 DIAGNOSIS — E78.5 HYPERLIPIDEMIA, UNSPECIFIED HYPERLIPIDEMIA TYPE: ICD-10-CM

## 2017-09-07 DIAGNOSIS — S32.2XXA CLOSED FRACTURE OF SACRUM AND COCCYX WITH COMPLETE CAUDA EQUINA LESION, INITIAL ENCOUNTER (HCC): ICD-10-CM

## 2017-09-07 DIAGNOSIS — Z12.5 SPECIAL SCREENING FOR MALIGNANT NEOPLASM OF PROSTATE: ICD-10-CM

## 2017-09-07 DIAGNOSIS — S34.3XXA CLOSED FRACTURE OF SACRUM AND COCCYX WITH COMPLETE CAUDA EQUINA LESION, INITIAL ENCOUNTER (HCC): ICD-10-CM

## 2017-09-07 DIAGNOSIS — Z00.00 HEALTH MAINTENANCE EXAMINATION: ICD-10-CM

## 2017-09-07 DIAGNOSIS — I10 ESSENTIAL HYPERTENSION, BENIGN: Primary | ICD-10-CM

## 2017-09-07 LAB
ALBUMIN SERPL-MCNC: 3.4 G/DL (ref 3.5–5.2)
ALP BLD-CCNC: 67 U/L (ref 40–130)
ALT SERPL-CCNC: 25 U/L (ref 5–41)
ANION GAP SERPL CALCULATED.3IONS-SCNC: 19 MMOL/L (ref 7–19)
AST SERPL-CCNC: 22 U/L (ref 5–40)
BILIRUB SERPL-MCNC: 0.4 MG/DL (ref 0.2–1.2)
BILIRUBIN URINE: NEGATIVE
BLOOD, URINE: NEGATIVE
BUN BLDV-MCNC: 26 MG/DL (ref 8–23)
CALCIUM SERPL-MCNC: 9.1 MG/DL (ref 8.8–10.2)
CHLORIDE BLD-SCNC: 97 MMOL/L (ref 98–111)
CHOLESTEROL, TOTAL: 132 MG/DL (ref 160–199)
CLARITY: CLEAR
CO2: 23 MMOL/L (ref 22–29)
COLOR: YELLOW
CREAT SERPL-MCNC: 1.4 MG/DL (ref 0.5–1.2)
GFR NON-AFRICAN AMERICAN: 49
GLUCOSE BLD-MCNC: 104 MG/DL (ref 74–109)
GLUCOSE URINE: NEGATIVE MG/DL
HCT VFR BLD CALC: 29 % (ref 42–52)
HDLC SERPL-MCNC: 56 MG/DL (ref 55–121)
HEMOGLOBIN: 9.4 G/DL (ref 14–18)
KETONES, URINE: NEGATIVE MG/DL
LDL CHOLESTEROL CALCULATED: 56 MG/DL
LEUKOCYTE ESTERASE, URINE: NEGATIVE
MCH RBC QN AUTO: 28.9 PG (ref 27–31)
MCHC RBC AUTO-ENTMCNC: 32.4 G/DL (ref 33–37)
MCV RBC AUTO: 89.2 FL (ref 80–94)
NITRITE, URINE: NEGATIVE
PDW BLD-RTO: 17.1 % (ref 11.5–14.5)
PH UA: 5.5
PLATELET # BLD: 203 K/UL (ref 130–400)
PMV BLD AUTO: 10 FL (ref 9.4–12.4)
POTASSIUM SERPL-SCNC: 4.1 MMOL/L (ref 3.5–5)
PROSTATE SPECIFIC ANTIGEN: 0.48 NG/ML (ref 0–4)
PROTEIN UA: NEGATIVE MG/DL
RBC # BLD: 3.25 M/UL (ref 4.7–6.1)
SODIUM BLD-SCNC: 139 MMOL/L (ref 136–145)
SPECIFIC GRAVITY UA: 1.01
TOTAL PROTEIN: 7.1 G/DL (ref 6.6–8.7)
TRIGL SERPL-MCNC: 101 MG/DL (ref 150–199)
UROBILINOGEN, URINE: 0.2 E.U./DL
WBC # BLD: 16.3 K/UL (ref 4.8–10.8)

## 2017-09-07 PROCEDURE — 72192 CT PELVIS W/O DYE: CPT

## 2017-09-11 ENCOUNTER — TELEPHONE (OUTPATIENT)
Dept: INTERNAL MEDICINE | Age: 77
End: 2017-09-11

## 2017-09-11 ENCOUNTER — HOSPITAL ENCOUNTER (OUTPATIENT)
Dept: GENERAL RADIOLOGY | Age: 77
Discharge: HOME | End: 2017-09-11
Payer: MEDICARE

## 2017-09-11 ENCOUNTER — OFFICE VISIT (OUTPATIENT)
Dept: INTERNAL MEDICINE | Age: 77
End: 2017-09-11
Payer: MEDICARE

## 2017-09-11 ENCOUNTER — HOSPITAL ENCOUNTER (OUTPATIENT)
Dept: NON INVASIVE DIAGNOSTICS | Age: 77
Discharge: HOME | End: 2017-09-11
Payer: MEDICARE

## 2017-09-11 VITALS
WEIGHT: 190 LBS | BODY MASS INDEX: 25.73 KG/M2 | SYSTOLIC BLOOD PRESSURE: 96 MMHG | TEMPERATURE: 97.3 F | DIASTOLIC BLOOD PRESSURE: 52 MMHG | OXYGEN SATURATION: 90 % | HEIGHT: 72 IN | HEART RATE: 73 BPM

## 2017-09-11 DIAGNOSIS — R05.9 COUGH: ICD-10-CM

## 2017-09-11 DIAGNOSIS — M79.604 PAIN OF RIGHT LOWER EXTREMITY: ICD-10-CM

## 2017-09-11 DIAGNOSIS — M79.661 PAIN AND SWELLING OF RIGHT LOWER LEG: ICD-10-CM

## 2017-09-11 DIAGNOSIS — M79.89 PAIN AND SWELLING OF RIGHT LOWER LEG: ICD-10-CM

## 2017-09-11 DIAGNOSIS — I10 ESSENTIAL HYPERTENSION: ICD-10-CM

## 2017-09-11 DIAGNOSIS — D50.9 IRON DEFICIENCY ANEMIA, UNSPECIFIED IRON DEFICIENCY ANEMIA TYPE: ICD-10-CM

## 2017-09-11 DIAGNOSIS — Z95.810 AICD (AUTOMATIC CARDIOVERTER/DEFIBRILLATOR) PRESENT: ICD-10-CM

## 2017-09-11 DIAGNOSIS — M48.00 SPINAL STENOSIS, UNSPECIFIED SPINAL REGION: ICD-10-CM

## 2017-09-11 DIAGNOSIS — F32.9 REACTIVE DEPRESSION: ICD-10-CM

## 2017-09-11 DIAGNOSIS — D50.9 IRON DEFICIENCY ANEMIA, UNSPECIFIED IRON DEFICIENCY ANEMIA TYPE: Primary | ICD-10-CM

## 2017-09-11 DIAGNOSIS — Z00.00 HEALTH CARE MAINTENANCE: ICD-10-CM

## 2017-09-11 LAB
HCT VFR BLD CALC: 32.5 % (ref 42–52)
HEMOGLOBIN: 10.1 G/DL (ref 14–18)
IRON: 53 UG/DL (ref 59–158)
MCH RBC QN AUTO: 28.4 PG (ref 27–31)
MCHC RBC AUTO-ENTMCNC: 31.1 G/DL (ref 33–37)
MCV RBC AUTO: 91.3 FL (ref 80–94)
PDW BLD-RTO: 16.9 % (ref 11.5–14.5)
PLATELET # BLD: 218 K/UL (ref 130–400)
PMV BLD AUTO: 10.4 FL (ref 9.4–12.4)
RBC # BLD: 3.56 M/UL (ref 4.7–6.1)
WBC # BLD: 12.6 K/UL (ref 4.8–10.8)

## 2017-09-11 PROCEDURE — 71020 XR CHEST STANDARD TWO VW: CPT

## 2017-09-11 PROCEDURE — 99214 OFFICE O/P EST MOD 30 MIN: CPT | Performed by: NURSE PRACTITIONER

## 2017-09-11 PROCEDURE — 93971 EXTREMITY STUDY: CPT

## 2017-09-11 RX ORDER — GABAPENTIN 400 MG/1
CAPSULE ORAL
COMMUNITY
Start: 2017-08-24 | End: 2017-12-12

## 2017-09-11 RX ORDER — OXYCODONE AND ACETAMINOPHEN 7.5; 325 MG/1; MG/1
TABLET ORAL
COMMUNITY
Start: 2017-08-11 | End: 2017-09-22 | Stop reason: ALTCHOICE

## 2017-09-11 RX ORDER — POTASSIUM CHLORIDE 1500 MG/1
20 TABLET, EXTENDED RELEASE ORAL DAILY
COMMUNITY
Start: 2017-08-24

## 2017-09-11 RX ORDER — BUMETANIDE 1 MG/1
TABLET ORAL
COMMUNITY
Start: 2017-08-24 | End: 2017-09-11 | Stop reason: SDUPTHER

## 2017-09-11 RX ORDER — VALSARTAN 80 MG/1
80 TABLET ORAL DAILY
Qty: 30 TABLET | Refills: 3 | Status: SHIPPED | OUTPATIENT
Start: 2017-09-11 | End: 2017-12-11 | Stop reason: DRUGHIGH

## 2017-09-11 RX ORDER — ISOSORBIDE MONONITRATE 60 MG/1
TABLET, EXTENDED RELEASE ORAL
Qty: 30 TABLET | Refills: 5 | Status: SHIPPED | OUTPATIENT
Start: 2017-09-11 | End: 2017-10-13 | Stop reason: SDUPTHER

## 2017-09-11 ASSESSMENT — ENCOUNTER SYMPTOMS
COLOR CHANGE: 0
NAUSEA: 0
ABDOMINAL PAIN: 0
SHORTNESS OF BREATH: 1
WHEEZING: 0
DIARRHEA: 0
SORE THROAT: 0
TROUBLE SWALLOWING: 0
BLOOD IN STOOL: 0
CHOKING: 1
ABDOMINAL DISTENTION: 0
VOMITING: 0
STRIDOR: 0
COUGH: 1
EYE ITCHING: 0
CONSTIPATION: 0
EYE DISCHARGE: 0
BACK PAIN: 1

## 2017-09-11 ASSESSMENT — PATIENT HEALTH QUESTIONNAIRE - PHQ9
SUM OF ALL RESPONSES TO PHQ9 QUESTIONS 1 & 2: 0
SUM OF ALL RESPONSES TO PHQ QUESTIONS 1-9: 0
2. FEELING DOWN, DEPRESSED OR HOPELESS: 0
1. LITTLE INTEREST OR PLEASURE IN DOING THINGS: 0

## 2017-09-15 ENCOUNTER — TELEPHONE (OUTPATIENT)
Dept: INTERNAL MEDICINE | Age: 77
End: 2017-09-15

## 2017-09-18 ENCOUNTER — TELEPHONE (OUTPATIENT)
Dept: INTERNAL MEDICINE | Age: 77
End: 2017-09-18

## 2017-09-19 DIAGNOSIS — M54.9 BACK PAIN, UNSPECIFIED BACK LOCATION, UNSPECIFIED BACK PAIN LATERALITY, UNSPECIFIED CHRONICITY: Primary | ICD-10-CM

## 2017-09-22 ENCOUNTER — OFFICE VISIT (OUTPATIENT)
Dept: INTERNAL MEDICINE | Age: 77
End: 2017-09-22
Payer: MEDICARE

## 2017-09-22 ENCOUNTER — TELEPHONE (OUTPATIENT)
Dept: INTERNAL MEDICINE | Age: 77
End: 2017-09-22

## 2017-09-22 VITALS
HEIGHT: 72 IN | HEART RATE: 73 BPM | TEMPERATURE: 97.8 F | WEIGHT: 197 LBS | BODY MASS INDEX: 26.68 KG/M2 | SYSTOLIC BLOOD PRESSURE: 122 MMHG | OXYGEN SATURATION: 92 % | DIASTOLIC BLOOD PRESSURE: 60 MMHG

## 2017-09-22 DIAGNOSIS — J20.9 ACUTE BRONCHITIS, UNSPECIFIED ORGANISM: ICD-10-CM

## 2017-09-22 DIAGNOSIS — S32.040D COMPRESSION FRACTURE OF L4 LUMBAR VERTEBRA, WITH ROUTINE HEALING, SUBSEQUENT ENCOUNTER: ICD-10-CM

## 2017-09-22 DIAGNOSIS — Z98.890 S/P KYPHOPLASTY: ICD-10-CM

## 2017-09-22 DIAGNOSIS — R31.9 HEMATURIA: Primary | ICD-10-CM

## 2017-09-22 DIAGNOSIS — R31.9 HEMATURIA: ICD-10-CM

## 2017-09-22 PROBLEM — S32.040A COMPRESSION FRACTURE OF L4 LUMBAR VERTEBRA: Status: ACTIVE | Noted: 2017-09-22

## 2017-09-22 LAB
BILIRUBIN URINE: NEGATIVE
BLOOD, URINE: NEGATIVE
CLARITY: CLEAR
COLOR: YELLOW
GLUCOSE URINE: NEGATIVE MG/DL
KETONES, URINE: NEGATIVE MG/DL
LEUKOCYTE ESTERASE, URINE: NEGATIVE
NITRITE, URINE: NEGATIVE
PH UA: 6
PROTEIN UA: NEGATIVE MG/DL
SPECIFIC GRAVITY UA: 1.01
UROBILINOGEN, URINE: 0.2 E.U./DL

## 2017-09-22 PROCEDURE — 99213 OFFICE O/P EST LOW 20 MIN: CPT | Performed by: NURSE PRACTITIONER

## 2017-09-22 PROCEDURE — 96372 THER/PROPH/DIAG INJ SC/IM: CPT | Performed by: NURSE PRACTITIONER

## 2017-09-22 RX ORDER — CEFDINIR 300 MG/1
300 CAPSULE ORAL 2 TIMES DAILY
Qty: 14 CAPSULE | Refills: 0 | Status: SHIPPED | OUTPATIENT
Start: 2017-09-22 | End: 2017-09-29

## 2017-09-22 RX ORDER — HYDROCODONE BITARTRATE AND ACETAMINOPHEN 10; 325 MG/1; MG/1
1 TABLET ORAL EVERY 8 HOURS PRN
Qty: 90 TABLET | Refills: 0 | Status: SHIPPED | OUTPATIENT
Start: 2017-09-22 | End: 2017-10-22

## 2017-09-22 RX ORDER — CYCLOBENZAPRINE HCL 10 MG
10 TABLET ORAL 3 TIMES DAILY PRN
Qty: 60 TABLET | Refills: 1 | Status: SHIPPED | OUTPATIENT
Start: 2017-09-22 | End: 2017-10-12

## 2017-09-22 RX ORDER — METHYLPREDNISOLONE ACETATE 80 MG/ML
80 INJECTION, SUSPENSION INTRA-ARTICULAR; INTRALESIONAL; INTRAMUSCULAR; SOFT TISSUE ONCE
Status: COMPLETED | OUTPATIENT
Start: 2017-09-22 | End: 2017-09-22

## 2017-09-22 RX ADMIN — METHYLPREDNISOLONE ACETATE 80 MG: 80 INJECTION, SUSPENSION INTRA-ARTICULAR; INTRALESIONAL; INTRAMUSCULAR; SOFT TISSUE at 11:35

## 2017-09-22 ASSESSMENT — ENCOUNTER SYMPTOMS
TROUBLE SWALLOWING: 0
CHOKING: 0
SHORTNESS OF BREATH: 1
DIARRHEA: 0
EYE ITCHING: 0
STRIDOR: 0
CONSTIPATION: 0
BLOOD IN STOOL: 0
BACK PAIN: 1
ABDOMINAL DISTENTION: 0
WHEEZING: 0
EYE DISCHARGE: 0
VOMITING: 0
COUGH: 1
SORE THROAT: 0
NAUSEA: 0
COLOR CHANGE: 0
WHEEZING: 1
ABDOMINAL PAIN: 0

## 2017-10-04 ENCOUNTER — OFFICE VISIT (OUTPATIENT)
Dept: INTERNAL MEDICINE | Age: 77
End: 2017-10-04
Payer: MEDICARE

## 2017-10-04 ENCOUNTER — HOSPITAL ENCOUNTER (OUTPATIENT)
Dept: CT IMAGING | Age: 77
Discharge: HOME | End: 2017-10-04
Payer: MEDICARE

## 2017-10-04 ENCOUNTER — TELEPHONE (OUTPATIENT)
Dept: INTERNAL MEDICINE | Age: 77
End: 2017-10-04

## 2017-10-04 VITALS
BODY MASS INDEX: 26.01 KG/M2 | RESPIRATION RATE: 18 BRPM | SYSTOLIC BLOOD PRESSURE: 134 MMHG | DIASTOLIC BLOOD PRESSURE: 68 MMHG | OXYGEN SATURATION: 97 % | WEIGHT: 192 LBS | HEIGHT: 72 IN | TEMPERATURE: 97 F | HEART RATE: 65 BPM

## 2017-10-04 DIAGNOSIS — R10.84 GENERALIZED ABDOMINAL PAIN: ICD-10-CM

## 2017-10-04 DIAGNOSIS — F32.A DEPRESSION, UNSPECIFIED DEPRESSION TYPE: ICD-10-CM

## 2017-10-04 DIAGNOSIS — J18.9 PNEUMONIA DUE TO INFECTIOUS ORGANISM, UNSPECIFIED LATERALITY, UNSPECIFIED PART OF LUNG: Primary | ICD-10-CM

## 2017-10-04 DIAGNOSIS — M25.552 PAIN OF BOTH HIP JOINTS: Primary | ICD-10-CM

## 2017-10-04 DIAGNOSIS — M25.551 PAIN OF BOTH HIP JOINTS: Primary | ICD-10-CM

## 2017-10-04 DIAGNOSIS — M54.42 ACUTE LOW BACK PAIN WITH LEFT-SIDED SCIATICA, UNSPECIFIED BACK PAIN LATERALITY: ICD-10-CM

## 2017-10-04 DIAGNOSIS — R10.84 GENERALIZED ABDOMINAL PAIN: Primary | ICD-10-CM

## 2017-10-04 LAB
ALBUMIN SERPL-MCNC: 3.7 G/DL (ref 3.5–5.2)
ALP BLD-CCNC: 46 U/L (ref 40–130)
ALT SERPL-CCNC: 15 U/L (ref 5–41)
ANION GAP SERPL CALCULATED.3IONS-SCNC: 13 MMOL/L (ref 7–19)
AST SERPL-CCNC: 16 U/L (ref 5–40)
BILIRUB SERPL-MCNC: 0.3 MG/DL (ref 0.2–1.2)
BILIRUBIN URINE: NEGATIVE
BLOOD, URINE: NEGATIVE
BUN BLDV-MCNC: 25 MG/DL (ref 8–23)
C-REACTIVE PROTEIN: 0.7 MG/DL (ref 0–0.5)
CALCIUM SERPL-MCNC: 9.3 MG/DL (ref 8.8–10.2)
CHLORIDE BLD-SCNC: 99 MMOL/L (ref 98–111)
CLARITY: CLEAR
CO2: 26 MMOL/L (ref 22–29)
COLOR: YELLOW
CREAT SERPL-MCNC: 1.1 MG/DL (ref 0.5–1.2)
GFR NON-AFRICAN AMERICAN: 54
GFR NON-AFRICAN AMERICAN: >60
GLUCOSE BLD-MCNC: 91 MG/DL (ref 74–109)
GLUCOSE URINE: NEGATIVE MG/DL
HCT VFR BLD CALC: 34.2 % (ref 42–52)
HEMOGLOBIN: 10.8 G/DL (ref 14–18)
KETONES, URINE: NEGATIVE MG/DL
LEUKOCYTE ESTERASE, URINE: NEGATIVE
MCH RBC QN AUTO: 29.1 PG (ref 27–31)
MCHC RBC AUTO-ENTMCNC: 31.6 G/DL (ref 33–37)
MCV RBC AUTO: 92.2 FL (ref 80–94)
NITRITE, URINE: NEGATIVE
PDW BLD-RTO: 15.2 % (ref 11.5–14.5)
PERFORMED ON: ABNORMAL
PH UA: 6
PLATELET # BLD: 217 K/UL (ref 130–400)
PMV BLD AUTO: 9.4 FL (ref 9.4–12.4)
POC CREATININE: 1.3 MG/DL (ref 0.3–1.3)
POC SAMPLE TYPE: ABNORMAL
POTASSIUM SERPL-SCNC: 4.8 MMOL/L (ref 3.5–5)
PROTEIN UA: NEGATIVE MG/DL
RBC # BLD: 3.71 M/UL (ref 4.7–6.1)
SEDIMENTATION RATE, ERYTHROCYTE: 47 MM/HR (ref 0–15)
SODIUM BLD-SCNC: 138 MMOL/L (ref 136–145)
SPECIFIC GRAVITY UA: 1.02
TOTAL PROTEIN: 7.1 G/DL (ref 6.6–8.7)
UROBILINOGEN, URINE: 0.2 E.U./DL
WBC # BLD: 12.6 K/UL (ref 4.8–10.8)

## 2017-10-04 PROCEDURE — 82565 ASSAY OF CREATININE: CPT

## 2017-10-04 PROCEDURE — 74178 CT ABD&PLV WO CNTR FLWD CNTR: CPT

## 2017-10-04 PROCEDURE — 99213 OFFICE O/P EST LOW 20 MIN: CPT | Performed by: NURSE PRACTITIONER

## 2017-10-04 PROCEDURE — 6360000004 HC RX CONTRAST MEDICATION: Performed by: NURSE PRACTITIONER

## 2017-10-04 RX ORDER — CEFDINIR 300 MG/1
300 CAPSULE ORAL 2 TIMES DAILY
Qty: 14 CAPSULE | Refills: 0 | Status: SHIPPED | OUTPATIENT
Start: 2017-10-04 | End: 2017-10-09 | Stop reason: SDUPTHER

## 2017-10-04 RX ORDER — DULOXETIN HYDROCHLORIDE 30 MG/1
30 CAPSULE, DELAYED RELEASE ORAL DAILY
Qty: 30 CAPSULE | Refills: 3 | Status: SHIPPED | OUTPATIENT
Start: 2017-10-04 | End: 2018-01-23 | Stop reason: SDUPTHER

## 2017-10-04 RX ADMIN — IOVERSOL 75 ML: 741 INJECTION INTRA-ARTERIAL; INTRAVENOUS at 12:19

## 2017-10-04 ASSESSMENT — ENCOUNTER SYMPTOMS
VOMITING: 0
NAUSEA: 0
BLOOD IN STOOL: 0
BACK PAIN: 1
STRIDOR: 0
SHORTNESS OF BREATH: 0
TROUBLE SWALLOWING: 0
CHOKING: 0
COLOR CHANGE: 0
ABDOMINAL DISTENTION: 0
DIARRHEA: 0
WHEEZING: 0
EYE DISCHARGE: 0
EYE ITCHING: 0
CONSTIPATION: 1
ABDOMINAL PAIN: 1
SORE THROAT: 0
COUGH: 0

## 2017-10-04 NOTE — PROGRESS NOTES
fatigue, fever and unexpected weight change. HENT: Negative for ear discharge, ear pain, mouth sores, sore throat and trouble swallowing. Eyes: Negative for discharge, itching and visual disturbance. Respiratory: Negative for cough, choking, shortness of breath, wheezing and stridor. Cardiovascular: Negative for chest pain, palpitations and leg swelling. Gastrointestinal: Positive for abdominal pain and constipation. Negative for abdominal distention, blood in stool, diarrhea, nausea and vomiting. Endocrine: Negative for cold intolerance, polydipsia and polyuria. Genitourinary: Negative for difficulty urinating, dysuria, frequency and urgency. Musculoskeletal: Positive for back pain. Negative for arthralgias and gait problem. Leg pain     Skin: Negative for color change and rash. Allergic/Immunologic: Negative for food allergies and immunocompromised state. Neurological: Negative for dizziness, tremors, syncope, speech difficulty, weakness and headaches. Hematological: Negative for adenopathy. Does not bruise/bleed easily. Psychiatric/Behavioral: Negative for confusion and hallucinations. Objective:     Physical Exam   Constitutional: He is oriented to person, place, and time. He appears well-developed and well-nourished. No distress. HENT:   Head: Normocephalic and atraumatic. Eyes: Pupils are equal, round, and reactive to light. Right eye exhibits no discharge. Left eye exhibits no discharge. No scleral icterus. Neck: Normal range of motion. Neck supple. No JVD present. No thyromegaly present. Cardiovascular: Normal rate, regular rhythm and normal heart sounds. No murmur heard. Pulmonary/Chest: Effort normal and breath sounds normal. No respiratory distress. He has no wheezes. He has no rales. Abdominal: Soft. Bowel sounds are normal. He exhibits no distension and no mass. There is tenderness. There is no rebound and no guarding.    Musculoskeletal: Normal range to the Normans that we can attempt to ask Dr. Belvie Oppenheim to see him it is problematic when one surgeon has done a procedure for you to get another surgeon to take over the case. His wife will go over to the office to talk to them about the progression with Dr. Pearl Stewart and after the procedure was performed finding out that her insurance would not pay for it at his office. #2 abdominal pain this today with HISTORY of the colectomy ×2 colostomies ×2 feel it is best to do a complete CT of his abdomen and pelvis with and without contrast. In addition to routine battery of tests including chemistries and amylase and lipase. His wife does give a history of brother and father having pancreatic cancer. #3 depression we will try Cymbalta as this helps with chronic pain as well starting at 30 mg. There is no strong this for a week or so and let us know if this is helping.  I did advise we can increase the dose if we need to      [facility] call later today with the report of the CAT scan and the labs    Electronically signed by KANCHAN Flynn on 10/4/2017 at 12:11 PM

## 2017-10-09 RX ORDER — CEFDINIR 300 MG/1
300 CAPSULE ORAL 2 TIMES DAILY
Qty: 14 CAPSULE | Refills: 0 | Status: SHIPPED | OUTPATIENT
Start: 2017-10-09 | End: 2017-10-23

## 2017-10-12 ENCOUNTER — TELEPHONE (OUTPATIENT)
Dept: CARDIOLOGY | Age: 77
End: 2017-10-12

## 2017-10-13 ENCOUNTER — OFFICE VISIT (OUTPATIENT)
Dept: CARDIOLOGY | Age: 77
End: 2017-10-13
Payer: MEDICARE

## 2017-10-13 VITALS
SYSTOLIC BLOOD PRESSURE: 130 MMHG | HEART RATE: 63 BPM | HEIGHT: 72 IN | DIASTOLIC BLOOD PRESSURE: 50 MMHG | WEIGHT: 192 LBS | BODY MASS INDEX: 26.01 KG/M2

## 2017-10-13 DIAGNOSIS — I25.10 CORONARY ARTERY DISEASE INVOLVING NATIVE CORONARY ARTERY OF NATIVE HEART WITHOUT ANGINA PECTORIS: Primary | ICD-10-CM

## 2017-10-13 DIAGNOSIS — I47.20 VENTRICULAR TACHYARRHYTHMIA: ICD-10-CM

## 2017-10-13 DIAGNOSIS — Z95.810 AICD (AUTOMATIC CARDIOVERTER/DEFIBRILLATOR) PRESENT: ICD-10-CM

## 2017-10-13 DIAGNOSIS — I10 ESSENTIAL HYPERTENSION: ICD-10-CM

## 2017-10-13 PROCEDURE — 99213 OFFICE O/P EST LOW 20 MIN: CPT | Performed by: CLINICAL NURSE SPECIALIST

## 2017-10-13 PROCEDURE — 93282 PRGRMG EVAL IMPLANTABLE DFB: CPT | Performed by: CLINICAL NURSE SPECIALIST

## 2017-10-13 RX ORDER — ISOSORBIDE MONONITRATE 60 MG/1
60 TABLET, EXTENDED RELEASE ORAL DAILY
Qty: 90 TABLET | Refills: 3 | Status: SHIPPED | OUTPATIENT
Start: 2017-10-13

## 2017-10-13 RX ORDER — AMIODARONE HYDROCHLORIDE 200 MG/1
200 TABLET ORAL DAILY
Qty: 90 TABLET | Refills: 3 | Status: SHIPPED | OUTPATIENT
Start: 2017-10-13

## 2017-10-13 RX ORDER — CYCLOBENZAPRINE HCL 10 MG
10 TABLET ORAL 3 TIMES DAILY PRN
COMMUNITY
End: 2017-12-12

## 2017-10-13 RX ORDER — BUMETANIDE 1 MG/1
1 TABLET ORAL 2 TIMES DAILY
COMMUNITY
End: 2017-12-11 | Stop reason: SDUPTHER

## 2017-10-13 ASSESSMENT — ENCOUNTER SYMPTOMS
NAUSEA: 0
HEARTBURN: 0
ORTHOPNEA: 0
BLURRED VISION: 0
SHORTNESS OF BREATH: 1
VOMITING: 0
COUGH: 0

## 2017-10-13 NOTE — PATIENT INSTRUCTIONS
to your doctor first. Wandy Sanders not take ibuprofen (Advil or Motrin) and naproxen (Aleve) without talking to your doctor first. They could make your heart failure worse. · You may be taking some of the following medicine. ¨ Beta-blockers can slow heart rate, decrease blood pressure, and improve your condition. Taking a beta-blocker may lower your chance of needing to be hospitalized. ¨ Angiotensin-converting enzyme inhibitors (ACEIs) reduce the heart's workload, lower blood pressure, and reduce swelling. Taking an ACEI may lower your chance of needing to be hospitalized again. ¨ Angiotensin II receptor blockers (ARBs) work like ACEIs. Your doctor may prescribe them instead of ACEIs. ¨ Diuretics, also called water pills, reduce swelling. ¨ Potassium supplements replace this important mineral, which is sometimes lost with diuretics. ¨ Aspirin and other blood thinners prevent blood clots, which can cause a stroke or heart attack. You will get more details on the specific medicines your doctor prescribes. Diet  · Your doctor may suggest that you limit sodium to 2,000 milligrams (mg) a day or less. That is less than 1 teaspoon of salt a day, including all the salt you eat in cooking or in packaged foods. People get most of their sodium from processed foods. Fast food and restaurant meals also tend to be very high in sodium. · Ask your doctor how much liquid you can drink each day. You may have to limit liquids. Weight  · Weigh yourself without clothing at the same time each day. Record your weight. Call your doctor if you have a sudden weight gain, such as more than 2 to 3 pounds in a day or 5 pounds in a week. (Your doctor may suggest a different range of weight gain.) A sudden weight gain may mean that your heart failure is getting worse. Activity level  · Start light exercise (if your doctor says it is okay).  Even if you can only do a small amount, exercise will help you get stronger, have more energy, and manage

## 2017-10-18 ENCOUNTER — OFFICE VISIT (OUTPATIENT)
Dept: INTERNAL MEDICINE | Age: 77
End: 2017-10-18
Payer: MEDICARE

## 2017-10-18 ENCOUNTER — HOSPITAL ENCOUNTER (OUTPATIENT)
Dept: GENERAL RADIOLOGY | Age: 77
Discharge: HOME | End: 2017-10-18
Payer: MEDICARE

## 2017-10-18 VITALS
RESPIRATION RATE: 18 BRPM | DIASTOLIC BLOOD PRESSURE: 68 MMHG | BODY MASS INDEX: 26.28 KG/M2 | HEART RATE: 65 BPM | WEIGHT: 194 LBS | OXYGEN SATURATION: 95 % | HEIGHT: 72 IN | SYSTOLIC BLOOD PRESSURE: 172 MMHG

## 2017-10-18 DIAGNOSIS — J18.9 PNEUMONIA DUE TO INFECTIOUS ORGANISM, UNSPECIFIED LATERALITY, UNSPECIFIED PART OF LUNG: ICD-10-CM

## 2017-10-18 DIAGNOSIS — J44.9 CHRONIC OBSTRUCTIVE PULMONARY DISEASE, UNSPECIFIED COPD TYPE (HCC): ICD-10-CM

## 2017-10-18 DIAGNOSIS — D64.9 ANEMIA, UNSPECIFIED TYPE: Primary | ICD-10-CM

## 2017-10-18 DIAGNOSIS — E55.9 VITAMIN D DEFICIENCY: ICD-10-CM

## 2017-10-18 DIAGNOSIS — M54.42 ACUTE LOW BACK PAIN WITH LEFT-SIDED SCIATICA, UNSPECIFIED BACK PAIN LATERALITY: ICD-10-CM

## 2017-10-18 DIAGNOSIS — S32.040S CLOSED COMPRESSION FRACTURE OF FOURTH LUMBAR VERTEBRA, SEQUELA: ICD-10-CM

## 2017-10-18 LAB
AMYLASE: 81 U/L (ref 28–100)
C-REACTIVE PROTEIN: 0.85 MG/DL (ref 0–0.5)
HCT VFR BLD CALC: 31.4 % (ref 42–52)
HEMOGLOBIN: 10.1 G/DL (ref 14–18)
LIPASE: 30 U/L (ref 13–60)
MCH RBC QN AUTO: 29.2 PG (ref 27–31)
MCHC RBC AUTO-ENTMCNC: 32.2 G/DL (ref 33–37)
MCV RBC AUTO: 90.8 FL (ref 80–94)
PDW BLD-RTO: 14.4 % (ref 11.5–14.5)
PLATELET # BLD: 182 K/UL (ref 130–400)
PMV BLD AUTO: 9.9 FL (ref 9.4–12.4)
RBC # BLD: 3.46 M/UL (ref 4.7–6.1)
SEDIMENTATION RATE, ERYTHROCYTE: 42 MM/HR (ref 0–15)
WBC # BLD: 9 K/UL (ref 4.8–10.8)

## 2017-10-18 PROCEDURE — 71020 XR CHEST STANDARD TWO VW: CPT

## 2017-10-18 PROCEDURE — 99213 OFFICE O/P EST LOW 20 MIN: CPT | Performed by: NURSE PRACTITIONER

## 2017-10-18 ASSESSMENT — ENCOUNTER SYMPTOMS
STRIDOR: 0
CONSTIPATION: 0
COLOR CHANGE: 0
DIARRHEA: 0
NAUSEA: 0
BLOOD IN STOOL: 0
VOMITING: 0
CHOKING: 0
SORE THROAT: 0
EYE ITCHING: 0
BACK PAIN: 1
SHORTNESS OF BREATH: 1
TROUBLE SWALLOWING: 0
WHEEZING: 0
ABDOMINAL DISTENTION: 0
ABDOMINAL PAIN: 0
EYE DISCHARGE: 0
COUGH: 1

## 2017-10-18 NOTE — PATIENT INSTRUCTIONS
#1 COPD continue with oxygen level and we will likely need some pulmonary rehab after his back is better  #2 anemia. We will add a vitamin D level today his hemoglobin is actually better and white count is back to normal  #3 compression fracture lumbar vertebrae.  Keep appointment with Dr. Francisco Shown you may want to restart the gabapentin back and that will help with some of your referred pain  #4 vitamin D deficiency we will recheck that level today and let you know

## 2017-10-18 NOTE — PROGRESS NOTES
[facility] INTERNAL MEDICINE  [facility] [facility] [address]  Dept: [phone]  Dept Fax: [phone]  Loc: [phone]    Micheal Veliz is a 69 y.o. male who presents today for his medical conditions/complaints as noted below. Micheal Veliz is c/o of Back Pain (Patient states still having back pain and will see Dr Kim Thibodeaux next week.) and Shortness of Breath (Patient states breathing seems to be some better. )        HPI:     HPI   #1 COPD this seems to be worsening with some mild deconditioning. He remains on 20% oxygen  #2 anemia suppose with this in the past today's hemoglobin is better  #3 compression fracture of the lumbar vertebrae this continues to cause him a tremendous amount of pain. His daughter had advised him to stop the gabapentin as she thought that that was causing him to have some increased confusion. It only been taking 400 mg once a day so he has had no reasonable side effects but this came about after he had fallen going and when the he thought his leg was dragging and he fell backwards onto his back. He really doesn't think his pain is gotten any worse. He has plans to go to see Dr. Kim Thibodeaux on Monday he has a bone scan that was done at through Dr. Kim Thibodeaux that just showed the uptake L4 at the site of his previous fracture and some over rib uptake but nothing new Miss Sara Alarcon says that they plan to have a PET scan as well  #4 vitamin D deficiency we will recheck his vitamin D level today is  Chief Complaint   Patient presents with    Back Pain     Patient states still having back pain and will see Dr Kim Thibodeaux next week.  Shortness of Breath     Patient states breathing seems to be some better. 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 Alysa Jackson CAD (coronary artery disease)     a. History of open-heart surgery X6, 01/12/06, utilizing the left VANCE to the LAD, sequential vein graft to the intermeditate and obtuse marginal, individually reverse saphenous vein graft to the acute maginal of the PDA and PLOM. b. Stress echocardiogram 04/22/08, negative for myocardial ischemia.  Chronic kidney disease (CKD), stage III (moderate)     COPD [facility]ronic obstructive pulmonary disease) [facility] [address]     Depression     Gastric ulcer 11/06/2004    multiple, duodenal ulcer - Dr. Chepe Camacho History of blood transfusion     Hypercholesterolemia     Hypertension     Thyroid disease     Ulcer ([facility] [address] 09/08/2010    Rectum.  Ventricular tachyarrhythmia [facility])       Past Surgical History:   Procedure Laterality Date    BACK SURGERY  08/17/2017    CARDIAC CATHETERIZATION  4/22/15  JDT    EF 50%    CARDIAC DEFIBRILLATOR PLACEMENT  04/24/2015    AICD    CARDIAC SURGERY  1/06    Open Heart Surgery (x6)    CATARACT REMOVAL  05    COLON SURGERY  6/26/04    Colon reversal (10/18/2004); Colon re-reversal (10/26/2004).  HERNIA REPAIR  10/26/2006    [facility]    OTHER SURGICAL HISTORY  11/2004    Bleeding ulcers.  OTHER SURGICAL HISTORY  02/14/2007    Debridement of abdominal/with pulse irrigation.  RETINAL DETACHMENT SURGERY  05/30/2005    RETINAL DETACHMENT SURGERY  06/21/2005    VASCULAR SURGERY  06/05/2017    SJS. Left CFA 5f-6-f destination,aortogram with bilateral lower arteriogram,right SFA/Pop atherectomy CSI 1.25 solid,right SFA /pop balloon balloon angioplasty 6x150 mynx left CFA.        Family History   Problem Relation Age of Onset    Hypertension Mother     Diabetes Father     Dementia Father     Cancer Father     Colon Cancer Father     Hypertension Father     Cancer Brother     Esophageal Cancer Sister        Social History   Substance Use Topics    Smoking status: Former Smoker    Smokeless tobacco: Never Used    Alcohol use 0.6 oz/week     1 Hina per week      Comment: weekly      Current Outpatient Prescriptions   Medication Sig Dispense Refill    bumetanide (BUMEX) 1 MG tablet Take 1 mg by mouth 2 times daily      cyclobenzaprine (FLEXERIL) 10 MG tablet Take 10 mg by mouth 3 times daily as needed for Muscle spasms      amiodarone (CORDARONE) 200 MG tablet Take 1 tablet by mouth daily 90 tablet 3    isosorbide mononitrate (IMDUR) 60 MG extended release tablet Take 1 tablet by mouth daily 90 tablet 3    cefdinir (OMNICEF) 300 MG capsule Take 1 capsule by mouth 2 times daily for 14 days 14 capsule 0    DULoxetine (CYMBALTA) 30 MG extended release capsule Take 1 capsule by mouth daily 30 capsule 3    HYDROcodone-acetaminophen (NORCO)  MG per tablet Take 1 tablet by mouth every 8 hours as needed for Pain . 90 tablet 0    KLOR-CON M20 20 MEQ extended release tablet       valsartan (DIOVAN) 80 MG tablet Take 1 tablet by mouth daily 30 tablet 3    b complex vitamins capsule Take 1 capsule by mouth daily      beclomethasone (QVAR) 80 MCG/ACT inhaler Inhale 1 puff into the lungs 2 times daily      atorvastatin (LIPITOR) 40 MG tablet Take 40 mg by mouth daily      albuterol (PROVENTIL) (2.5 MG/3ML) 0.083% nebulizer solution Take 2.5 mg by nebulization every 6 hours as needed for Wheezing      Tiotropium Bromide Monohydrate (SPIRIVA HANDIHALER IN) Inhale 1 spray into the lungs daily.  Albuterol Sulfate (PROAIR HFA IN) Inhale 2 puffs into the lungs every 6 hours as needed       gabapentin (NEURONTIN) 400 MG capsule        No current facility-administered medications for this visit.       Allergies   Allergen Reactions    Ativan [Lorazepam] Other (See Comments)    Bactrim     Biaxin [Clarithromycin]     Butamben-Tetracaine-Benzocaine     Demerol     Lorazepam     Sulfa Antibiotics        Health Maintenance   Topic Date Due    DTaP/Tdap/Td vaccine (1 - Tdap) 03/31/1960    Zostavax vaccine  03/31/2001    Pneumococcal help with some of your referred pain  #4 vitamin D deficiency we will recheck that level today and let you know    Electronically signed by KANCHAN Mccormick on 10/18/2017 at 11:58 AM

## 2017-11-10 ENCOUNTER — TELEPHONE (OUTPATIENT)
Dept: INTERNAL MEDICINE | Age: 77
End: 2017-11-10

## 2017-11-10 RX ORDER — HYDROCODONE BITARTRATE AND ACETAMINOPHEN 7.5; 325 MG/1; MG/1
TABLET ORAL
Qty: 30 TABLET | Refills: 0 | Status: ON HOLD | OUTPATIENT
Start: 2017-11-10 | End: 2017-12-20

## 2017-11-10 RX ORDER — CEFDINIR 300 MG/1
300 CAPSULE ORAL 2 TIMES DAILY
Qty: 14 CAPSULE | Refills: 0 | Status: SHIPPED | OUTPATIENT
Start: 2017-11-10 | End: 2017-11-17

## 2017-12-01 ENCOUNTER — TELEPHONE (OUTPATIENT)
Dept: NEUROSURGERY | Age: 77
End: 2017-12-01

## 2017-12-04 ENCOUNTER — TELEPHONE (OUTPATIENT)
Dept: INTERNAL MEDICINE | Age: 77
End: 2017-12-04

## 2017-12-04 RX ORDER — CEFDINIR 300 MG/1
300 CAPSULE ORAL 2 TIMES DAILY
Qty: 14 CAPSULE | Refills: 0 | Status: SHIPPED | OUTPATIENT
Start: 2017-12-04 | End: 2017-12-11

## 2017-12-05 ENCOUNTER — PREP FOR PROCEDURE (OUTPATIENT)
Dept: NEUROSURGERY | Age: 77
End: 2017-12-05

## 2017-12-05 ENCOUNTER — TELEPHONE (OUTPATIENT)
Dept: CARDIOLOGY | Age: 77
End: 2017-12-05

## 2017-12-05 ENCOUNTER — HOSPITAL ENCOUNTER (OUTPATIENT)
Dept: GENERAL RADIOLOGY | Age: 77
Discharge: HOME | End: 2017-12-05
Payer: MEDICARE

## 2017-12-05 ENCOUNTER — OFFICE VISIT (OUTPATIENT)
Dept: NEUROSURGERY | Age: 77
End: 2017-12-05
Payer: MEDICARE

## 2017-12-05 VITALS
SYSTOLIC BLOOD PRESSURE: 159 MMHG | HEIGHT: 72 IN | BODY MASS INDEX: 26.01 KG/M2 | DIASTOLIC BLOOD PRESSURE: 70 MMHG | HEART RATE: 60 BPM | OXYGEN SATURATION: 91 % | WEIGHT: 192 LBS

## 2017-12-05 DIAGNOSIS — M54.42 CHRONIC MIDLINE LOW BACK PAIN WITH LEFT-SIDED SCIATICA: ICD-10-CM

## 2017-12-05 DIAGNOSIS — M48.061 SPINAL STENOSIS OF LUMBAR REGION WITHOUT NEUROGENIC CLAUDICATION: ICD-10-CM

## 2017-12-05 DIAGNOSIS — Z98.890 S/P KYPHOPLASTY: ICD-10-CM

## 2017-12-05 DIAGNOSIS — Z01.818 PRE-OP TESTING: ICD-10-CM

## 2017-12-05 DIAGNOSIS — R79.1 ABNORMAL COAGULATION PROFILE: ICD-10-CM

## 2017-12-05 DIAGNOSIS — S32.040S CLOSED COMPRESSION FRACTURE OF FOURTH LUMBAR VERTEBRA, SEQUELA: ICD-10-CM

## 2017-12-05 DIAGNOSIS — G89.29 CHRONIC MIDLINE LOW BACK PAIN WITH LEFT-SIDED SCIATICA: ICD-10-CM

## 2017-12-05 DIAGNOSIS — M48.061 SPINAL STENOSIS OF LUMBAR REGION WITHOUT NEUROGENIC CLAUDICATION: Primary | ICD-10-CM

## 2017-12-05 PROCEDURE — 72110 X-RAY EXAM L-2 SPINE 4/>VWS: CPT

## 2017-12-05 PROCEDURE — 99205 OFFICE O/P NEW HI 60 MIN: CPT | Performed by: NURSE PRACTITIONER

## 2017-12-05 RX ORDER — SODIUM CHLORIDE 0.9 % (FLUSH) 0.9 %
10 SYRINGE (ML) INJECTION EVERY 12 HOURS SCHEDULED
Status: CANCELLED | OUTPATIENT
Start: 2017-12-05

## 2017-12-05 RX ORDER — SODIUM CHLORIDE 0.9 % (FLUSH) 0.9 %
10 SYRINGE (ML) INJECTION PRN
Status: CANCELLED | OUTPATIENT
Start: 2017-12-05

## 2017-12-05 ASSESSMENT — ENCOUNTER SYMPTOMS
SHORTNESS OF BREATH: 1
GASTROINTESTINAL NEGATIVE: 1
WHEEZING: 0
COUGH: 0
EYES NEGATIVE: 1
BACK PAIN: 1
HEMOPTYSIS: 0

## 2017-12-05 NOTE — TELEPHONE ENCOUNTER
Dr. Alysa [facility] office is needing cardiac clearance for patient to have decompressive laminectomy. Procedure is not scheduled as of yet. Patient had a cath in May of this year.

## 2017-12-05 NOTE — PROGRESS NOTES
[facility] Neurosurgery  Office Visit    Patient:   Micheal Veliz  MR#:    [id number]      YOB: 1940  Date of Visit:   12/5/2017  Time of Note:                          11:32 AM  Primary/Referring Physician:  Yoshi Garcia MD   Note Author:   Bria Pizano CNP    Chief Complaint   Patient presents with    Back Pain     severe pain in lower back    Leg Pain     occasional left leg pain ; left leg weakness       HISTORY OF PRESENT ILLNESS:      Micheal Veliz is a 69 y.o. male retired who presents with low back pain that is at the belt line that has progressively worsened over 4 months. The pain does radiate into the left leg intermittently. His pain is mostly located in the low back. The patient denies numbness. He states that he cannot stand very long, or walk without severe low back pain. He states that he has been falling a lot. He states that he has not been in a grocery store in a long time because it hurts too bad to go. He had a previous kyphoplasty at L4 with Dr. Candy Jefferson in August, 2017. He states that he had some relief for about 1 month afterwards and then developed the severe pain upon standing and walking. Mr. Alfonso Rodríguez was referred to us by Dr. Kim Thibodeaux due to his multiple medical problems and his cardiologist being here at [facility]. His extensive medical history is listed below. His pain is worsened when going from a seated to standing position. His pain is significantly worsened with walking. His pain is not changed when lying flat. Overall, indicative that the patient does have a mechanical nature to their pain. He states that 95% of his pain is located in the back and 5% is leg pain. The patient states that he can no longer walk or stand for even short periods of time which has dramatically affected his quality of life.      The patient has underwent a non-operative treatment course that has included:  NSAIDs  Gabapentin  Muscle Relaxers (flexeril)  Opiates 6x150 mynx left CFA. Current Outpatient Prescriptions   Medication Sig Dispense Refill    cefdinir (OMNICEF) 300 MG capsule Take 1 capsule by mouth 2 times daily for 7 days 14 capsule 0    HYDROcodone-acetaminophen (NORCO) 7.5-325 MG per tablet Take one to  1 1/2 every 6 hours as needed for pain. 30 tablet 0    bumetanide (BUMEX) 1 MG tablet Take 1 mg by mouth 2 times daily      cyclobenzaprine (FLEXERIL) 10 MG tablet Take 10 mg by mouth 3 times daily as needed for Muscle spasms      amiodarone (CORDARONE) 200 MG tablet Take 1 tablet by mouth daily 90 tablet 3    isosorbide mononitrate (IMDUR) 60 MG extended release tablet Take 1 tablet by mouth daily 90 tablet 3    DULoxetine (CYMBALTA) 30 MG extended release capsule Take 1 capsule by mouth daily 30 capsule 3    gabapentin (NEURONTIN) 400 MG capsule       KLOR-CON M20 20 MEQ extended release tablet       valsartan (DIOVAN) 80 MG tablet Take 1 tablet by mouth daily 30 tablet 3    b complex vitamins capsule Take 1 capsule by mouth daily      beclomethasone (QVAR) 80 MCG/ACT inhaler Inhale 1 puff into the lungs 2 times daily      atorvastatin (LIPITOR) 40 MG tablet Take 40 mg by mouth daily      albuterol (PROVENTIL) (2.5 MG/3ML) 0.083% nebulizer solution Take 2.5 mg by nebulization every 6 hours as needed for Wheezing      Tiotropium Bromide Monohydrate (SPIRIVA HANDIHALER IN) Inhale 1 spray into the lungs daily.  Albuterol Sulfate (PROAIR HFA IN) Inhale 2 puffs into the lungs every 6 hours as needed        No current facility-administered medications for this visit. Allergies:  Ativan [lorazepam]; Bactrim; Biaxin [clarithromycin];  Butamben-tetracaine-benzocaine; Demerol; Lorazepam; and Sulfa antibiotics    Social History:   History   Smoking Status    Former Smoker   Smokeless Tobacco    Never Used     History   Alcohol Use    0.6 oz/week    1 Cans of beer per week     Comment: weekly         Family History:   Family History Problem Relation Age of Onset    Hypertension Mother     Diabetes Father     Dementia Father     Cancer Father     Colon Cancer Father     Hypertension Father     Cancer Brother     Esophageal Cancer Sister        REVIEW OF SYSTEMS:  Review of Systems   Constitutional: Positive for malaise/fatigue and weight loss. Negative for chills, diaphoresis and fever. HENT: Negative. Eyes: Negative. Respiratory: Positive for shortness of breath. Negative for cough, hemoptysis and wheezing. Difficulty breathing   Cardiovascular: Negative. Gastrointestinal: Negative. Genitourinary: Negative. Musculoskeletal: Positive for back pain, joint pain and myalgias. Negative for falls and neck pain. Skin: Negative. Neurological: Negative. Negative for weakness. Endo/Heme/Allergies: Negative. Psychiatric/Behavioral: Positive for depression. Negative for hallucinations, memory loss, substance abuse and suicidal ideas. The patient has insomnia. The patient is not nervous/anxious. PHYSICAL EXAM:  Vitals:    12/05/17 1023   BP: (!) 159/70   Pulse: 60   SpO2:      Constitutional: appears well-developed and well-nourished. Eyes - conjunctiva normal.  Pupils react to light  Ear, nose, throat -hearing intact to finger rub, No scars, masses, or lesions over external nose or ears, no atrophy of tongue  Neck-symmetric, no masses noted, no jugular vein distension  Respiration- chest wall appears symmetric, good expansion, normal effort without use of accessory muscles  Musculoskeletal - no significant wasting of muscles noted, no bony deformities, gait no gross ataxia  Extremities-no clubbing, cyanosis or edema  Skin - warm, dry, and intact. No rash, erythema, or pallor.   Psychiatric - mood, affect, and behavior appear normal.     Neurologic Examinaiton  Awake, Alert and oriented x 3  Normal speech pattern, following commands  Motor 5/5 all extremities  No deficits to light touch or pinprick sensation  Reflexes are 2+ and symmetric  No myofacial tenderness to palpation  Very unsteady Gait pattern  Cannot walk in Tandem      DATA and IMAGING:    Nursing/pcp notes, imaging, labs, and vitals reviewed. PT,OT and/or speech notes reviewed    Lab Results   Component Value Date    WBC 9.0 10/18/2017    HGB 10.1 (L) 10/18/2017    HCT 31.4 (L) 10/18/2017    MCV 90.8 10/18/2017     10/18/2017     Lab Results   Component Value Date     10/04/2017    K 4.8 10/04/2017    CL 99 10/04/2017    CO2 26 10/04/2017    BUN 25 (H) 10/04/2017    CREATININE 1.3 10/04/2017    GLUCOSE 91 10/04/2017    CALCIUM 9.3 10/04/2017    PROT 7.1 10/04/2017    LABALBU 3.7 10/04/2017    BILITOT 0.3 10/04/2017    ALKPHOS 46 10/04/2017    AST 16 10/04/2017    ALT 15 10/04/2017    LABGLOM 54 (A) 10/04/2017    GLOB 2.3 04/06/2017     Lab Results   Component Value Date    INR 1.07 04/06/2017    INR 1.05 03/14/2016    INR 1.05 04/22/2015    PROTIME 13.9 04/06/2017    PROTIME 13.4 03/14/2016    PROTIME 13.4 04/22/2015     CT Myelogram Lumbar (11/28/2017)  I have personally reviewed these images and my interpretation is: There is evidence of previous kyphoplasty at L4, there is mild retropulsion of the L4 vertebral body  L3-4 there is severe canal stenosis, this is focal right at the disc space  L4-5 mild canal stenosis, there is possibly a mild spondylolisthesis of L4 on L5      ASSESSMENT:  Isacc Parsons is a 69 y.o. male retired who presents with low back pain that is at the belt line that has progressively worsened over 4 months. ICD-10-CM ICD-9-CM    1. Spinal stenosis of lumbar region without neurogenic claudication M48.061 724.02    2. Chronic midline low back pain with left-sided sciatica M54.42 724.2     G89.29 724.3      338.29    3. Closed compression fracture of fourth lumbar vertebra, sequela S32.040S 905.1    4.  S/P kyphoplasty Z98.890 V45.89        PLAN:  -We have discussed and reviewed the results/findings of the CT myelogram at length with Mr. Lira Backer  -Obtain XR lumbar Flex/ex to rule out instability     He will need a L3-4 decompressive laminectomy using minimally invasive technique     We will need to obtain cardiac clearance with Dr. Mariah Anderson given his heart history. Note: A total of >50% (31 minutes) of 60 minutes was spent discussing the pathophysiology and treatment and/or coordination of care of the above diagnoses.       Xiang Gonzalez, CNP

## 2017-12-05 NOTE — H&P
[facility] Neurosurgery  H&P Visit    Patient:   Stevo Abdalla  MR#:    [id number]      YOB: 1940  Date of Visit:   12/5/2017  Time of Note:                          11:32 AM  Primary/Referring Physician:  Emely Piña MD   Note Author:   Lora Mcdowell CNP    Chief Complaint   Patient presents with    Back Pain     severe pain in lower back    Leg Pain     occasional left leg pain ; left leg weakness       HISTORY OF PRESENT ILLNESS:      Stevo Abdalla is a 69 y.o. male retired who presents with low back pain that is at the belt line that has progressively worsened over 4 months. The pain does radiate into the left leg intermittently. His pain is mostly located in the low back. The patient denies numbness. He states that he cannot stand very long, or walk without severe low back pain. He states that he has been falling a lot. He states that he has not been in a grocery store in a long time because it hurts too bad to go. He had a previous kyphoplasty at L4 with Dr. Abdiel Camargo in August, 2017. He states that he had some relief for about 1 month afterwards and then developed the severe pain upon standing and walking. Mr. Kathe Chairez was referred to us by Dr. Sonal Miller due to his multiple medical problems and his cardiologist being here at [facility]. His extensive medical history is listed below. His pain is worsened when going from a seated to standing position. His pain is significantly worsened with walking. His pain is not changed when lying flat. Overall, indicative that the patient does have a mechanical nature to their pain. He states that 95% of his pain is located in the back and 5% is leg pain. The patient states that he can no longer walk or stand for even short periods of time which has dramatically affected his quality of life.      The patient has underwent a non-operative treatment course that has included:  NSAIDs  Gabapentin  Muscle Relaxers (flexeril)  Opiates (Norco)  Oral Steroids      Of note he does not use tobacco and does not take blood thinning medications. 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 Alysa Jackson CAD (coronary artery disease)     a. History of open-heart surgery X6, 01/12/06, utilizing the left VANCE to the LAD, sequential vein graft to the intermeditate and obtuse marginal, individually reverse saphenous vein graft to the acute maginal of the PDA and PLOM. b. Stress echocardiogram 04/22/08, negative for myocardial ischemia.  Carotid artery stenosis     Chronic kidney disease     Chronic kidney disease (CKD), stage III (moderate)     COPD (chronic obstructive pulmonary disease) (HCC)     Depression     Gastric ulcer 11/06/2004    multiple, duodenal ulcer - Dr. Hoda Tom History of blood transfusion     Hypercholesterolemia     Hypertension     Thyroid disease     Ulcer ([facility] [address] 09/08/2010    Rectum.  Ventricular tachyarrhythmia [facility])        Past Surgical History:   Procedure Laterality Date    BACK SURGERY  08/17/2017    CARDIAC CATHETERIZATION  4/22/15  JDT    EF 50%    CARDIAC DEFIBRILLATOR PLACEMENT  04/24/2015    AICD    CARDIAC SURGERY  1/06    Open Heart Surgery (x6)    CATARACT REMOVAL  05    COLON SURGERY  6/26/04    Colon reversal (10/18/2004); Colon re-reversal (10/26/2004).  HERNIA REPAIR  10/26/2006    Ileostomy reversal and hernia repair. [facility]    OTHER SURGICAL HISTORY  11/2004    Bleeding ulcers.  OTHER SURGICAL HISTORY  02/14/2007    Debridement of abdominal/with pulse irrigation.  RETINAL DETACHMENT SURGERY  05/30/2005    RETINAL DETACHMENT SURGERY  06/21/2005    VASCULAR SURGERY  06/05/2017    SJS. Left CFA 5f-6-f destination,aortogram with bilateral lower arteriogram,right SFA/Pop atherectomy CSI 1.25 solid,right SFA /pop balloon balloon angioplasty 6x150 mynx left CFA. Current Outpatient Prescriptions   Medication Sig Dispense Refill    cefdinir (OMNICEF) 300 MG capsule Take 1 capsule by mouth 2 times daily for 7 days 14 capsule 0    HYDROcodone-acetaminophen (NORCO) 7.5-325 MG per tablet Take one to  1 1/2 every 6 hours as needed for pain. 30 tablet 0    bumetanide (BUMEX) 1 MG tablet Take 1 mg by mouth 2 times daily      cyclobenzaprine (FLEXERIL) 10 MG tablet Take 10 mg by mouth 3 times daily as needed for Muscle spasms      amiodarone (CORDARONE) 200 MG tablet Take 1 tablet by mouth daily 90 tablet 3    isosorbide mononitrate (IMDUR) 60 MG extended release tablet Take 1 tablet by mouth daily 90 tablet 3    DULoxetine (CYMBALTA) 30 MG extended release capsule Take 1 capsule by mouth daily 30 capsule 3    gabapentin (NEURONTIN) 400 MG capsule       KLOR-CON M20 20 MEQ extended release tablet       valsartan (DIOVAN) 80 MG tablet Take 1 tablet by mouth daily 30 tablet 3    b complex vitamins capsule Take 1 capsule by mouth daily      beclomethasone (QVAR) 80 MCG/ACT inhaler Inhale 1 puff into the lungs 2 times daily      atorvastatin (LIPITOR) 40 MG tablet Take 40 mg by mouth daily      albuterol (PROVENTIL) (2.5 MG/3ML) 0.083% nebulizer solution Take 2.5 mg by nebulization every 6 hours as needed for Wheezing      Tiotropium Bromide Monohydrate (SPIRIVA HANDIHALER IN) Inhale 1 spray into the lungs daily.  Albuterol Sulfate (PROAIR HFA IN) Inhale 2 puffs into the lungs every 6 hours as needed        No current facility-administered medications for this visit. Allergies:  Ativan [lorazepam]; Bactrim; Biaxin [clarithromycin];  Butamben-tetracaine-benzocaine; Demerol; Lorazepam; and Sulfa antibiotics    Social History:   History   Smoking Status    Former Smoker   Smokeless Tobacco    Never Used     History   Alcohol Use    0.6 oz/week    1 Cans of beer per week     Comment: weekly         Family History:   Family History Problem Relation Age of Onset    Hypertension Mother     Diabetes Father     Dementia Father     Cancer Father     Colon Cancer Father     Hypertension Father     Cancer Brother     Esophageal Cancer Sister        REVIEW OF SYSTEMS:  Review of Systems   Constitutional: Positive for malaise/fatigue and weight loss. Negative for chills, diaphoresis and fever. HENT: Negative. Eyes: Negative. Respiratory: Positive for shortness of breath. Negative for cough, hemoptysis and wheezing. Difficulty breathing   Cardiovascular: Negative. Gastrointestinal: Negative. Genitourinary: Negative. Musculoskeletal: Positive for back pain, joint pain and myalgias. Negative for falls and neck pain. Skin: Negative. Neurological: Negative. Negative for weakness. Endo/Heme/Allergies: Negative. Psychiatric/Behavioral: Positive for depression. Negative for hallucinations, memory loss, substance abuse and suicidal ideas. The patient has insomnia. The patient is not nervous/anxious. PHYSICAL EXAM:  Vitals:    12/05/17 1023   BP: (!) 159/70   Pulse: 60   SpO2:      Constitutional: appears well-developed and well-nourished. Eyes - conjunctiva normal.  Pupils react to light  Ear, nose, throat -hearing intact to finger rub, No scars, masses, or lesions over external nose or ears, no atrophy of tongue  Neck-symmetric, no masses noted, no jugular vein distension  Respiration- chest wall appears symmetric, good expansion, normal effort without use of accessory muscles  Musculoskeletal - no significant wasting of muscles noted, no bony deformities, gait no gross ataxia  Extremities-no clubbing, cyanosis or edema  Skin - warm, dry, and intact. No rash, erythema, or pallor.   Psychiatric - mood, affect, and behavior appear normal.     Neurologic Examinaiton  Awake, Alert and oriented x 3  Normal speech pattern, following commands  Motor 5/5 all extremities  No deficits to light touch or pinprick

## 2017-12-11 ENCOUNTER — TELEPHONE (OUTPATIENT)
Dept: INTERNAL MEDICINE | Age: 77
End: 2017-12-11

## 2017-12-11 RX ORDER — VALSARTAN 160 MG/1
160 TABLET ORAL DAILY
COMMUNITY
End: 2017-12-11 | Stop reason: SDUPTHER

## 2017-12-11 RX ORDER — BUMETANIDE 1 MG/1
1 TABLET ORAL 2 TIMES DAILY
Qty: 60 TABLET | Refills: 1 | Status: SHIPPED | OUTPATIENT
Start: 2017-12-11 | End: 2018-01-23 | Stop reason: SDUPTHER

## 2017-12-11 RX ORDER — BUMETANIDE 1 MG/1
1 TABLET ORAL 2 TIMES DAILY
Qty: 30 TABLET | Refills: 1 | Status: CANCELLED | OUTPATIENT
Start: 2017-12-11

## 2017-12-11 RX ORDER — VALSARTAN 160 MG/1
160 TABLET ORAL DAILY
Qty: 30 TABLET | Refills: 1 | Status: SHIPPED | OUTPATIENT
Start: 2017-12-11 | End: 2018-01-23 | Stop reason: SDUPTHER

## 2017-12-11 NOTE — TELEPHONE ENCOUNTER
Meli Calvin called requesting a refill of the below medication which has been pended for you:     Requested Prescriptions     Pending Prescriptions Disp Refills    bumetanide (BUMEX) 1 MG tablet 60 tablet 1     Sig: Take 1 tablet by mouth 2 times daily    valsartan (DIOVAN) 160 MG tablet 30 tablet 1     Sig: Take 1 tablet by mouth daily       Last Appointment Date: Visit date not found  Next Appointment Date: Visit date not found    Allergies   Allergen Reactions    Ativan [Lorazepam] Other (See Comments)    Bactrim     Biaxin [Clarithromycin]     Butamben-Tetracaine-Benzocaine     Demerol     Lorazepam     Sulfa Antibiotics

## 2017-12-11 NOTE — TELEPHONE ENCOUNTER
Patient wife called stating BP has been running high.  Patient has been taking Valsartan 80mg daily and wife wants to know if this needs to be increased back to Valsartan 160mg  Please Advise

## 2017-12-12 ENCOUNTER — HOSPITAL ENCOUNTER (OUTPATIENT)
Dept: PREADMISSION TESTING | Age: 77
Discharge: HOME | End: 2017-12-12
Payer: MEDICARE

## 2017-12-12 ENCOUNTER — HOSPITAL ENCOUNTER (OUTPATIENT)
Dept: GENERAL RADIOLOGY | Age: 77
Discharge: HOME | End: 2017-12-12
Payer: MEDICARE

## 2017-12-12 VITALS — WEIGHT: 196 LBS | BODY MASS INDEX: 26.55 KG/M2 | HEIGHT: 72 IN

## 2017-12-12 DIAGNOSIS — Z01.818 PRE-OP TESTING: ICD-10-CM

## 2017-12-12 DIAGNOSIS — R79.1 ABNORMAL COAGULATION PROFILE: ICD-10-CM

## 2017-12-12 DIAGNOSIS — M48.061 SPINAL STENOSIS OF LUMBAR REGION WITHOUT NEUROGENIC CLAUDICATION: ICD-10-CM

## 2017-12-12 LAB
ALBUMIN SERPL-MCNC: 3.9 G/DL (ref 3.5–5.2)
ALP BLD-CCNC: 65 U/L (ref 40–130)
ALT SERPL-CCNC: 18 U/L (ref 5–41)
ANION GAP SERPL CALCULATED.3IONS-SCNC: 12 MMOL/L (ref 7–19)
APTT: 27.5 SEC (ref 26–36.2)
AST SERPL-CCNC: 23 U/L (ref 5–40)
BILIRUB SERPL-MCNC: <0.2 MG/DL (ref 0.2–1.2)
BILIRUBIN URINE: NEGATIVE
BLOOD, URINE: NEGATIVE
BUN BLDV-MCNC: 21 MG/DL (ref 8–23)
CALCIUM SERPL-MCNC: 9.4 MG/DL (ref 8.8–10.2)
CHLORIDE BLD-SCNC: 99 MMOL/L (ref 98–111)
CLARITY: CLEAR
CO2: 28 MMOL/L (ref 22–29)
COLOR: YELLOW
CREAT SERPL-MCNC: 1.1 MG/DL (ref 0.5–1.2)
GFR NON-AFRICAN AMERICAN: >60
GLUCOSE BLD-MCNC: 127 MG/DL (ref 74–109)
GLUCOSE URINE: NEGATIVE MG/DL
HCT VFR BLD CALC: 34.1 % (ref 42–52)
HEMOGLOBIN: 10.5 G/DL (ref 14–18)
INR BLD: 1.05 (ref 0.88–1.18)
KETONES, URINE: NEGATIVE MG/DL
LEUKOCYTE ESTERASE, URINE: NEGATIVE
MCH RBC QN AUTO: 27.9 PG (ref 27–31)
MCHC RBC AUTO-ENTMCNC: 30.8 G/DL (ref 33–37)
MCV RBC AUTO: 90.7 FL (ref 80–94)
NITRITE, URINE: NEGATIVE
PDW BLD-RTO: 14 % (ref 11.5–14.5)
PH UA: 6
PLATELET # BLD: 259 K/UL (ref 130–400)
PMV BLD AUTO: 10 FL (ref 9.4–12.4)
POTASSIUM SERPL-SCNC: 4.8 MMOL/L (ref 3.5–5)
PROTEIN UA: NEGATIVE MG/DL
PROTHROMBIN TIME: 13.6 SEC (ref 12–14.6)
RBC # BLD: 3.76 M/UL (ref 4.7–6.1)
SODIUM BLD-SCNC: 139 MMOL/L (ref 136–145)
SPECIFIC GRAVITY UA: 1.02
TOTAL PROTEIN: 7.1 G/DL (ref 6.6–8.7)
UROBILINOGEN, URINE: 0.2 E.U./DL
WBC # BLD: 8.9 K/UL (ref 4.8–10.8)

## 2017-12-12 PROCEDURE — 71020 XR CHEST STANDARD TWO VW: CPT

## 2017-12-12 PROCEDURE — 93005 ELECTROCARDIOGRAM TRACING: CPT

## 2017-12-12 PROCEDURE — 85730 THROMBOPLASTIN TIME PARTIAL: CPT

## 2017-12-12 PROCEDURE — 85027 COMPLETE CBC AUTOMATED: CPT

## 2017-12-12 PROCEDURE — 80053 COMPREHEN METABOLIC PANEL: CPT

## 2017-12-12 PROCEDURE — 85610 PROTHROMBIN TIME: CPT

## 2017-12-12 PROCEDURE — 81003 URINALYSIS AUTO W/O SCOPE: CPT

## 2017-12-13 LAB
EKG P AXIS: 76 DEGREES
EKG P-R INTERVAL: 202 MS
EKG Q-T INTERVAL: 450 MS
EKG QRS DURATION: 160 MS
EKG QTC CALCULATION (BAZETT): 469 MS
EKG T AXIS: 69 DEGREES

## 2017-12-19 ENCOUNTER — ANESTHESIA EVENT (OUTPATIENT)
Dept: OPERATING ROOM | Age: 77
End: 2017-12-19
Payer: MEDICARE

## 2017-12-20 ENCOUNTER — ANESTHESIA (OUTPATIENT)
Dept: OPERATING ROOM | Age: 77
End: 2017-12-20
Payer: MEDICARE

## 2017-12-20 ENCOUNTER — APPOINTMENT (OUTPATIENT)
Dept: GENERAL RADIOLOGY | Age: 77
End: 2017-12-20
Attending: NEUROLOGICAL SURGERY
Payer: MEDICARE

## 2017-12-20 ENCOUNTER — HOSPITAL ENCOUNTER (OUTPATIENT)
Age: 77
Setting detail: OUTPATIENT SURGERY
Discharge: HOME | End: 2017-12-20
Attending: NEUROLOGICAL SURGERY | Admitting: NEUROLOGICAL SURGERY
Payer: MEDICARE

## 2017-12-20 VITALS
TEMPERATURE: 95.5 F | RESPIRATION RATE: 20 BRPM | DIASTOLIC BLOOD PRESSURE: 62 MMHG | SYSTOLIC BLOOD PRESSURE: 140 MMHG | OXYGEN SATURATION: 100 %

## 2017-12-20 VITALS
BODY MASS INDEX: 26.55 KG/M2 | TEMPERATURE: 97 F | HEIGHT: 72 IN | DIASTOLIC BLOOD PRESSURE: 66 MMHG | RESPIRATION RATE: 20 BRPM | WEIGHT: 196 LBS | HEART RATE: 64 BPM | OXYGEN SATURATION: 93 % | SYSTOLIC BLOOD PRESSURE: 149 MMHG

## 2017-12-20 PROBLEM — M48.062 LUMBAR STENOSIS WITH NEUROGENIC CLAUDICATION: Status: ACTIVE | Noted: 2017-12-20

## 2017-12-20 LAB
ABO/RH: NORMAL
ANTIBODY SCREEN: NORMAL

## 2017-12-20 PROCEDURE — 63047 LAM FACETEC & FORAMOT LUMBAR: CPT | Performed by: NEUROLOGICAL SURGERY

## 2017-12-20 PROCEDURE — 86900 BLOOD TYPING SEROLOGIC ABO: CPT

## 2017-12-20 PROCEDURE — 2580000003 HC RX 258: Performed by: NURSE PRACTITIONER

## 2017-12-20 PROCEDURE — 2500000003 HC RX 250 WO HCPCS: Performed by: NURSE ANESTHETIST, CERTIFIED REGISTERED

## 2017-12-20 PROCEDURE — 36415 COLL VENOUS BLD VENIPUNCTURE: CPT

## 2017-12-20 PROCEDURE — 3600000015 HC SURGERY LEVEL 5 ADDTL 15MIN: Performed by: NEUROLOGICAL SURGERY

## 2017-12-20 PROCEDURE — 3700000000 HC ANESTHESIA ATTENDED CARE: Performed by: NEUROLOGICAL SURGERY

## 2017-12-20 PROCEDURE — 6360000002 HC RX W HCPCS: Performed by: NEUROLOGICAL SURGERY

## 2017-12-20 PROCEDURE — 3600000005 HC SURGERY LEVEL 5 BASE: Performed by: NEUROLOGICAL SURGERY

## 2017-12-20 PROCEDURE — 6370000000 HC RX 637 (ALT 250 FOR IP)

## 2017-12-20 PROCEDURE — C1769 GUIDE WIRE: HCPCS | Performed by: NEUROLOGICAL SURGERY

## 2017-12-20 PROCEDURE — A6258 TRANSPARENT FILM >16<=48 IN: HCPCS | Performed by: NEUROLOGICAL SURGERY

## 2017-12-20 PROCEDURE — 3209999900 FLUORO FOR SURGICAL PROCEDURES

## 2017-12-20 PROCEDURE — 2580000003 HC RX 258: Performed by: ANESTHESIOLOGY

## 2017-12-20 PROCEDURE — 3700000001 HC ADD 15 MINUTES (ANESTHESIA): Performed by: NEUROLOGICAL SURGERY

## 2017-12-20 PROCEDURE — 2720000001 HC MISC SURG SUPPLY STERILE $51-500: Performed by: NEUROLOGICAL SURGERY

## 2017-12-20 PROCEDURE — 6370000000 HC RX 637 (ALT 250 FOR IP): Performed by: NEUROLOGICAL SURGERY

## 2017-12-20 PROCEDURE — 7100000011 HC PHASE II RECOVERY - ADDTL 15 MIN: Performed by: NEUROLOGICAL SURGERY

## 2017-12-20 PROCEDURE — 6360000002 HC RX W HCPCS: Performed by: NURSE PRACTITIONER

## 2017-12-20 PROCEDURE — 86901 BLOOD TYPING SEROLOGIC RH(D): CPT

## 2017-12-20 PROCEDURE — 2580000003 HC RX 258: Performed by: NURSE ANESTHETIST, CERTIFIED REGISTERED

## 2017-12-20 PROCEDURE — 6360000002 HC RX W HCPCS: Performed by: NURSE ANESTHETIST, CERTIFIED REGISTERED

## 2017-12-20 PROCEDURE — 2580000003 HC RX 258: Performed by: NEUROLOGICAL SURGERY

## 2017-12-20 PROCEDURE — 7100000000 HC PACU RECOVERY - FIRST 15 MIN: Performed by: NEUROLOGICAL SURGERY

## 2017-12-20 PROCEDURE — 86850 RBC ANTIBODY SCREEN: CPT

## 2017-12-20 PROCEDURE — 2500000003 HC RX 250 WO HCPCS: Performed by: NEUROLOGICAL SURGERY

## 2017-12-20 PROCEDURE — 7100000010 HC PHASE II RECOVERY - FIRST 15 MIN: Performed by: NEUROLOGICAL SURGERY

## 2017-12-20 PROCEDURE — 7100000001 HC PACU RECOVERY - ADDTL 15 MIN: Performed by: NEUROLOGICAL SURGERY

## 2017-12-20 PROCEDURE — 2500000003 HC RX 250 WO HCPCS: Performed by: ANESTHESIOLOGY

## 2017-12-20 RX ORDER — SODIUM CHLORIDE 0.9 % (FLUSH) 0.9 %
10 SYRINGE (ML) INJECTION PRN
Status: DISCONTINUED | OUTPATIENT
Start: 2017-12-20 | End: 2017-12-20 | Stop reason: HOSPADM

## 2017-12-20 RX ORDER — ONDANSETRON 2 MG/ML
INJECTION INTRAMUSCULAR; INTRAVENOUS PRN
Status: DISCONTINUED | OUTPATIENT
Start: 2017-12-20 | End: 2017-12-20 | Stop reason: SDUPTHER

## 2017-12-20 RX ORDER — METOCLOPRAMIDE HYDROCHLORIDE 5 MG/ML
10 INJECTION INTRAMUSCULAR; INTRAVENOUS
Status: DISCONTINUED | OUTPATIENT
Start: 2017-12-20 | End: 2017-12-20 | Stop reason: HOSPADM

## 2017-12-20 RX ORDER — MORPHINE SULFATE 4 MG/ML
4 INJECTION, SOLUTION INTRAMUSCULAR; INTRAVENOUS EVERY 5 MIN PRN
Status: DISCONTINUED | OUTPATIENT
Start: 2017-12-20 | End: 2017-12-20 | Stop reason: HOSPADM

## 2017-12-20 RX ORDER — HYDRALAZINE HYDROCHLORIDE 20 MG/ML
5 INJECTION INTRAMUSCULAR; INTRAVENOUS EVERY 10 MIN PRN
Status: DISCONTINUED | OUTPATIENT
Start: 2017-12-20 | End: 2017-12-20 | Stop reason: HOSPADM

## 2017-12-20 RX ORDER — ENALAPRILAT 2.5 MG/2ML
1.25 INJECTION INTRAVENOUS
Status: DISCONTINUED | OUTPATIENT
Start: 2017-12-20 | End: 2017-12-20 | Stop reason: HOSPADM

## 2017-12-20 RX ORDER — PROPOFOL 10 MG/ML
INJECTION, EMULSION INTRAVENOUS PRN
Status: DISCONTINUED | OUTPATIENT
Start: 2017-12-20 | End: 2017-12-20 | Stop reason: SDUPTHER

## 2017-12-20 RX ORDER — ONDANSETRON 4 MG/1
4 TABLET, FILM COATED ORAL EVERY 8 HOURS PRN
Qty: 30 TABLET | Refills: 1 | Status: SHIPPED | OUTPATIENT
Start: 2017-12-20 | End: 2018-04-17

## 2017-12-20 RX ORDER — METHYLPREDNISOLONE ACETATE 40 MG/ML
INJECTION, SUSPENSION INTRA-ARTICULAR; INTRALESIONAL; INTRAMUSCULAR; SOFT TISSUE PRN
Status: DISCONTINUED | OUTPATIENT
Start: 2017-12-20 | End: 2017-12-20 | Stop reason: HOSPADM

## 2017-12-20 RX ORDER — SODIUM CHLORIDE 0.9 % (FLUSH) 0.9 %
10 SYRINGE (ML) INJECTION EVERY 12 HOURS SCHEDULED
Status: DISCONTINUED | OUTPATIENT
Start: 2017-12-20 | End: 2017-12-20 | Stop reason: HOSPADM

## 2017-12-20 RX ORDER — FENTANYL CITRATE 50 UG/ML
INJECTION, SOLUTION INTRAMUSCULAR; INTRAVENOUS PRN
Status: DISCONTINUED | OUTPATIENT
Start: 2017-12-20 | End: 2017-12-20 | Stop reason: SDUPTHER

## 2017-12-20 RX ORDER — LABETALOL HYDROCHLORIDE 5 MG/ML
5 INJECTION, SOLUTION INTRAVENOUS EVERY 10 MIN PRN
Status: DISCONTINUED | OUTPATIENT
Start: 2017-12-20 | End: 2017-12-20 | Stop reason: HOSPADM

## 2017-12-20 RX ORDER — ROCURONIUM BROMIDE 10 MG/ML
INJECTION, SOLUTION INTRAVENOUS PRN
Status: DISCONTINUED | OUTPATIENT
Start: 2017-12-20 | End: 2017-12-20 | Stop reason: SDUPTHER

## 2017-12-20 RX ORDER — SODIUM CHLORIDE, SODIUM LACTATE, POTASSIUM CHLORIDE, CALCIUM CHLORIDE 600; 310; 30; 20 MG/100ML; MG/100ML; MG/100ML; MG/100ML
INJECTION, SOLUTION INTRAVENOUS CONTINUOUS
Status: DISCONTINUED | OUTPATIENT
Start: 2017-12-20 | End: 2017-12-20 | Stop reason: HOSPADM

## 2017-12-20 RX ORDER — LIDOCAINE HYDROCHLORIDE 10 MG/ML
1 INJECTION, SOLUTION EPIDURAL; INFILTRATION; INTRACAUDAL; PERINEURAL
Status: COMPLETED | OUTPATIENT
Start: 2017-12-20 | End: 2017-12-20

## 2017-12-20 RX ORDER — MORPHINE SULFATE 4 MG/ML
2 INJECTION, SOLUTION INTRAMUSCULAR; INTRAVENOUS EVERY 5 MIN PRN
Status: DISCONTINUED | OUTPATIENT
Start: 2017-12-20 | End: 2017-12-20 | Stop reason: HOSPADM

## 2017-12-20 RX ORDER — DEXAMETHASONE SODIUM PHOSPHATE 10 MG/ML
INJECTION INTRAMUSCULAR; INTRAVENOUS PRN
Status: DISCONTINUED | OUTPATIENT
Start: 2017-12-20 | End: 2017-12-20 | Stop reason: SDUPTHER

## 2017-12-20 RX ORDER — HYDROCODONE BITARTRATE AND ACETAMINOPHEN 7.5; 325 MG/1; MG/1
1 TABLET ORAL ONCE
Status: COMPLETED | OUTPATIENT
Start: 2017-12-20 | End: 2017-12-20

## 2017-12-20 RX ORDER — PROMETHAZINE HYDROCHLORIDE 25 MG/ML
6.25 INJECTION, SOLUTION INTRAMUSCULAR; INTRAVENOUS
Status: DISCONTINUED | OUTPATIENT
Start: 2017-12-20 | End: 2017-12-20 | Stop reason: HOSPADM

## 2017-12-20 RX ORDER — EPHEDRINE SULFATE 50 MG/ML
INJECTION, SOLUTION INTRAVENOUS PRN
Status: DISCONTINUED | OUTPATIENT
Start: 2017-12-20 | End: 2017-12-20 | Stop reason: SDUPTHER

## 2017-12-20 RX ORDER — HYDROCODONE BITARTRATE AND ACETAMINOPHEN 7.5; 325 MG/1; MG/1
TABLET ORAL
Status: COMPLETED
Start: 2017-12-20 | End: 2017-12-20

## 2017-12-20 RX ORDER — DOCUSATE SODIUM 100 MG/1
100 CAPSULE, LIQUID FILLED ORAL 2 TIMES DAILY PRN
Qty: 30 CAPSULE | Refills: 1 | Status: SHIPPED | OUTPATIENT
Start: 2017-12-20 | End: 2018-08-13 | Stop reason: CLARIF

## 2017-12-20 RX ORDER — FENTANYL CITRATE 50 UG/ML
INJECTION, SOLUTION INTRAMUSCULAR; INTRAVENOUS PRN
Status: DISCONTINUED | OUTPATIENT
Start: 2017-12-20 | End: 2017-12-20

## 2017-12-20 RX ORDER — HYDROCODONE BITARTRATE AND ACETAMINOPHEN 7.5; 325 MG/1; MG/1
TABLET ORAL
Qty: 30 TABLET | Refills: 0 | Status: SHIPPED | OUTPATIENT
Start: 2017-12-20 | End: 2018-04-17

## 2017-12-20 RX ORDER — MORPHINE SULFATE 4 MG/ML
INJECTION, SOLUTION INTRAMUSCULAR; INTRAVENOUS PRN
Status: DISCONTINUED | OUTPATIENT
Start: 2017-12-20 | End: 2017-12-20 | Stop reason: SDUPTHER

## 2017-12-20 RX ORDER — DIPHENHYDRAMINE HYDROCHLORIDE 50 MG/ML
12.5 INJECTION INTRAMUSCULAR; INTRAVENOUS
Status: DISCONTINUED | OUTPATIENT
Start: 2017-12-20 | End: 2017-12-20 | Stop reason: HOSPADM

## 2017-12-20 RX ORDER — SODIUM CHLORIDE, SODIUM LACTATE, POTASSIUM CHLORIDE, CALCIUM CHLORIDE 600; 310; 30; 20 MG/100ML; MG/100ML; MG/100ML; MG/100ML
INJECTION, SOLUTION INTRAVENOUS CONTINUOUS PRN
Status: DISCONTINUED | OUTPATIENT
Start: 2017-12-20 | End: 2017-12-20 | Stop reason: SDUPTHER

## 2017-12-20 RX ADMIN — HYDROCODONE BITARTRATE AND ACETAMINOPHEN 1 TABLET: 7.5; 325 TABLET ORAL at 15:04

## 2017-12-20 RX ADMIN — PROPOFOL 180 MG: 10 INJECTION, EMULSION INTRAVENOUS at 12:42

## 2017-12-20 RX ADMIN — CEFAZOLIN 2 G: 1 INJECTION, POWDER, FOR SOLUTION INTRAMUSCULAR; INTRAVENOUS; PARENTERAL at 13:02

## 2017-12-20 RX ADMIN — SODIUM CHLORIDE, SODIUM LACTATE, POTASSIUM CHLORIDE, AND CALCIUM CHLORIDE: 600; 310; 30; 20 INJECTION, SOLUTION INTRAVENOUS at 13:39

## 2017-12-20 RX ADMIN — SODIUM CHLORIDE, SODIUM LACTATE, POTASSIUM CHLORIDE, AND CALCIUM CHLORIDE: 600; 310; 30; 20 INJECTION, SOLUTION INTRAVENOUS at 12:39

## 2017-12-20 RX ADMIN — ONDANSETRON HYDROCHLORIDE 4 MG: 2 SOLUTION INTRAMUSCULAR; INTRAVENOUS at 13:35

## 2017-12-20 RX ADMIN — SUGAMMADEX 160 MG: 100 INJECTION, SOLUTION INTRAVENOUS at 13:44

## 2017-12-20 RX ADMIN — FENTANYL CITRATE 25 MCG: 50 INJECTION, SOLUTION INTRAMUSCULAR; INTRAVENOUS at 13:39

## 2017-12-20 RX ADMIN — PROPOFOL 160 MG: 10 INJECTION, EMULSION INTRAVENOUS at 12:43

## 2017-12-20 RX ADMIN — LIDOCAINE HYDROCHLORIDE 1 ML: 10 INJECTION, SOLUTION EPIDURAL; INFILTRATION; INTRACAUDAL; PERINEURAL at 09:35

## 2017-12-20 RX ADMIN — EPHEDRINE SULFATE 10 MG: 50 INJECTION, SOLUTION INTRAMUSCULAR; INTRAVENOUS; SUBCUTANEOUS at 13:33

## 2017-12-20 RX ADMIN — MORPHINE SULFATE 4 MG: 4 INJECTION INTRAVENOUS at 12:39

## 2017-12-20 RX ADMIN — ROCURONIUM BROMIDE 40 MG: 10 INJECTION INTRAVENOUS at 12:43

## 2017-12-20 RX ADMIN — SODIUM CHLORIDE, POTASSIUM CHLORIDE, SODIUM LACTATE AND CALCIUM CHLORIDE: 600; 310; 30; 20 INJECTION, SOLUTION INTRAVENOUS at 09:35

## 2017-12-20 RX ADMIN — DEXAMETHASONE SODIUM PHOSPHATE 10 MG: 10 INJECTION INTRAMUSCULAR; INTRAVENOUS at 13:16

## 2017-12-20 ASSESSMENT — PAIN - FUNCTIONAL ASSESSMENT: PAIN_FUNCTIONAL_ASSESSMENT: 0-10

## 2017-12-20 ASSESSMENT — PAIN DESCRIPTION - PAIN TYPE: TYPE: SURGICAL PAIN

## 2017-12-20 ASSESSMENT — PAIN SCALES - GENERAL
PAINLEVEL_OUTOF10: 8
PAINLEVEL_OUTOF10: 6

## 2017-12-20 ASSESSMENT — PAIN DESCRIPTION - DESCRIPTORS: DESCRIPTORS: ACHING

## 2017-12-20 ASSESSMENT — PAIN DESCRIPTION - LOCATION: LOCATION: BACK

## 2017-12-20 ASSESSMENT — LIFESTYLE VARIABLES: SMOKING_STATUS: 0

## 2017-12-20 NOTE — H&P (VIEW-ONLY)
[facility] Neurosurgery  H&P Visit    Patient:   Betty Trammell  MR#:    [id number]      YOB: 1940  Date of Visit:   12/5/2017  Time of Note:                          11:32 AM  Primary/Referring Physician:  Priscilla Stewart MD   Note Author:   Juliet Lindsay CNP    Chief Complaint   Patient presents with    Back Pain     severe pain in lower back    Leg Pain     occasional left leg pain ; left leg weakness       HISTORY OF PRESENT ILLNESS:      Betty Trammell is a 69 y.o. male retired who presents with low back pain that is at the belt line that has progressively worsened over 4 months. The pain does radiate into the left leg intermittently. His pain is mostly located in the low back. The patient denies numbness. He states that he cannot stand very long, or walk without severe low back pain. He states that he has been falling a lot. He states that he has not been in a grocery store in a long time because it hurts too bad to go. He had a previous kyphoplasty at L4 with Dr. Pia Berkowitz in August, 2017. He states that he had some relief for about 1 month afterwards and then developed the severe pain upon standing and walking. Mr. Luc Crane was referred to us by Dr. Kia Tavares due to his multiple medical problems and his cardiologist being here at [facility]. His extensive medical history is listed below. His pain is worsened when going from a seated to standing position. His pain is significantly worsened with walking. His pain is not changed when lying flat. Overall, indicative that the patient does have a mechanical nature to their pain. He states that 95% of his pain is located in the back and 5% is leg pain. The patient states that he can no longer walk or stand for even short periods of time which has dramatically affected his quality of life.      The patient has underwent a non-operative treatment course that has included:  NSAIDs  Gabapentin  Muscle Relaxers (flexeril)  Opiates 6x150 mynx left CFA. Current Outpatient Prescriptions   Medication Sig Dispense Refill    cefdinir (OMNICEF) 300 MG capsule Take 1 capsule by mouth 2 times daily for 7 days 14 capsule 0    HYDROcodone-acetaminophen (NORCO) 7.5-325 MG per tablet Take one to  1 1/2 every 6 hours as needed for pain. 30 tablet 0    bumetanide (BUMEX) 1 MG tablet Take 1 mg by mouth 2 times daily      cyclobenzaprine (FLEXERIL) 10 MG tablet Take 10 mg by mouth 3 times daily as needed for Muscle spasms      amiodarone (CORDARONE) 200 MG tablet Take 1 tablet by mouth daily 90 tablet 3    isosorbide mononitrate (IMDUR) 60 MG extended release tablet Take 1 tablet by mouth daily 90 tablet 3    DULoxetine (CYMBALTA) 30 MG extended release capsule Take 1 capsule by mouth daily 30 capsule 3    gabapentin (NEURONTIN) 400 MG capsule       KLOR-CON M20 20 MEQ extended release tablet       valsartan (DIOVAN) 80 MG tablet Take 1 tablet by mouth daily 30 tablet 3    b complex vitamins capsule Take 1 capsule by mouth daily      beclomethasone (QVAR) 80 MCG/ACT inhaler Inhale 1 puff into the lungs 2 times daily      atorvastatin (LIPITOR) 40 MG tablet Take 40 mg by mouth daily      albuterol (PROVENTIL) (2.5 MG/3ML) 0.083% nebulizer solution Take 2.5 mg by nebulization every 6 hours as needed for Wheezing      Tiotropium Bromide Monohydrate (SPIRIVA HANDIHALER IN) Inhale 1 spray into the lungs daily.  Albuterol Sulfate (PROAIR HFA IN) Inhale 2 puffs into the lungs every 6 hours as needed        No current facility-administered medications for this visit. Allergies:  Ativan [lorazepam]; Bactrim; Biaxin [clarithromycin];  Butamben-tetracaine-benzocaine; Demerol; Lorazepam; and Sulfa antibiotics    Social History:   History   Smoking Status    Former Smoker   Smokeless Tobacco    Never Used     History   Alcohol Use    0.6 oz/week    1 Cans of beer per week     Comment: weekly         Family History:   Family History Problem Relation Age of Onset    Hypertension Mother     Diabetes Father     Dementia Father     Cancer Father     Colon Cancer Father     Hypertension Father     Cancer Brother     Esophageal Cancer Sister        REVIEW OF SYSTEMS:  Review of Systems   Constitutional: Positive for malaise/fatigue and weight loss. Negative for chills, diaphoresis and fever. HENT: Negative. Eyes: Negative. Respiratory: Positive for shortness of breath. Negative for cough, hemoptysis and wheezing. Difficulty breathing   Cardiovascular: Negative. Gastrointestinal: Negative. Genitourinary: Negative. Musculoskeletal: Positive for back pain, joint pain and myalgias. Negative for falls and neck pain. Skin: Negative. Neurological: Negative. Negative for weakness. Endo/Heme/Allergies: Negative. Psychiatric/Behavioral: Positive for depression. Negative for hallucinations, memory loss, substance abuse and suicidal ideas. The patient has insomnia. The patient is not nervous/anxious. PHYSICAL EXAM:  Vitals:    12/05/17 1023   BP: (!) 159/70   Pulse: 60   SpO2:      Constitutional: appears well-developed and well-nourished. Eyes  conjunctiva normal.  Pupils react to light  Ear, nose, throat -hearing intact to finger rub, No scars, masses, or lesions over external nose or ears, no atrophy of tongue  Neck-symmetric, no masses noted, no jugular vein distension  Respiration- chest wall appears symmetric, good expansion, normal effort without use of accessory muscles  Musculoskeletal  no significant wasting of muscles noted, no bony deformities, gait no gross ataxia  Extremities-no clubbing, cyanosis or edema  Skin  warm, dry, and intact. No rash, erythema, or pallor.   Psychiatric  mood, affect, and behavior appear normal.     Neurologic Examinaiton  Awake, Alert and oriented x 3  Normal speech pattern, following commands  Motor 5/5 all extremities  No deficits to light touch or pinprick

## 2017-12-20 NOTE — OP NOTE
NEUROSURGICAL DEPARTMENT REPORT     NAME OF SURGEON/: Cheikh Langley DO     DATE OF SERVICE: 12/20/2017     PREOPERATIVE DIAGNOSES    1. Lumbar spinal stenosis  L3-4 with radiculopathy, recalcitrant to nonoperatively treatments.     POSTOPERATIVE DIAGNOSES    same     OPERATIVE PROCEDURES  L3 and L4 hemilaminotomies with L3-4 medial facetectomy  for decompression of the thecal sac and nerve roots utilizing the operating microscope,the tubular retractor and microdissection technique.     SURGEON  Raymundo Fatima DO     FIRST ASSIST    John     DESCRIPTION OF PROCEDURE    The patient was brought to the operating room where general endotracheal anesthesia was introduced was positioned on the operating table in the prone position with the Keyon frame in place. All pressure points were carefully checked and padded. The C-arm fluoroscope was positioned and draped. Operating microscope was positioned and draped. The lumbosacral region was clipped and prepared for 10 minutes with Betadine scrub, Betadine paint and DuraPrep. The patient was draped in the usual sterile fashion. After the patient's skin was prepped and draped, the level of the L3-4 disc space was localized fluoroscopically utilizing a 22-gauge needle. A 16 mm incision was made 1.5 cm off midline at this level. Incision was carried out to subcutaneous tissue. The underlying fascia was penetrated with a K wire. Sequential dilators were inserted over the K wire followed by placement of a 5 cm length 16 mm diameter tubular retractor. A tubular retractor was connected to the articulating arm. The dilators were removed the operating microscope was bought brought into the field. Under microscopic view, L3 and L4 hemilaminotomies as well as a L3-4 medial facetectomy was carried out. Bone removal was performed utilizing various Kerrison rongeurs as well as the high-speed drill.  The ligamentum flavum was opened and was resected with a fine-angled Kerrison rongeur. This allowed exposure of the dural sac and the nerve roots. Inspection with the ball tip probe revealed that the thecal sac and nerve roots to be completely decompressed. The wound was then copiously irrigated with antibiotic solution and meticulous hemostasis was assured. 40 mL of Depo-Medrol was applied. The tubular retractor was removed. The wound was closed in layered fashion. A sterile dressing was applied. The patient was returned to the stretcher in the supine position and extubated. The patient was then returned recovery room in a stable condition.

## 2017-12-20 NOTE — ANESTHESIA PRE PROCEDURE
Department of Anesthesiology  Preprocedure Note       Name:  Isacc Parsons   Age:  69 y.o.  :  1940                                          MRN:  [id number]         Date:  2017      Surgeon: Jens Cr):  Addie Parikh DO    Procedure: Procedure(s):  L3-4 decompressive laminectomy using minimally invasive technique    Medications prior to admission:   Prior to Admission medications    Medication Sig Start Date End Date Taking? Authorizing Provider   Albuterol Sulfate (PROAIR HFA IN) Inhale 2 puffs into the lungs every 6 hours as needed    Yes Historical Provider, MD   bumetanide (BUMEX) 1 MG tablet Take 1 tablet by mouth 2 times daily 17   Torey Mott MD   valsartan (DIOVAN) 160 MG tablet Take 1 tablet by mouth daily 17   Torey Mott MD   HY[facility]aminophen ([facility]) 7.5-325 MG per tablet Take one to  1 1/2 every 6 hours as needed for pain. 11/10/17   KANCHAN Duvall   amiodarone (CORDARONE) 200 MG tablet Take 1 tablet by mouth daily 10/13/17   KANCHAN Jara   isosorbide mononitrate (IMDUR) 60 MG extended release tablet Take 1 tablet by mouth daily 10/13/17   KANCHAN Jara   DULoxetine (CYMBALTA) 30 MG extended release capsule Take 1 capsule by mouth daily 10/4/17   KANCHAN Duvall   KLOR-CON M20 20 MEQ extended release tablet  17   Historical Provider, MD   atorvastatin (LIPITOR) 40 MG tablet Take 40 mg by mouth daily    Historical Provider, MD   albuterol (PROVENTIL) (2.5 MG/3ML) 0.083% nebulizer solution Take 2.5 mg by nebulization every 6 hours as needed for Wheezing    Historical Provider, MD   Tiotropium Bromide Monohydrate (SPIRIVA HANDIHALER IN) Inhale 1 spray into the lungs daily.       Historical Provider, MD       Current medications:    Current Facility-Administered Medications   Medication Dose Route Frequency Provider Last Rate Last Dose    ceFAZolin (ANCEF) 2 g in sterile water 20 mL IV syringe  2 g Intravenous On Call to OR disease     Chronic kidney disease (CKD), stage III (moderate)     COPD [facility]chronic obstructive pulmonary disease) ([facility] [address]     Depression     Gastric ulcer 11/06/2004    multiple, duodenal ulcer - Dr. Margie Garland History of blood transfusion     Hypercholesterolemia     Hypertension     Thyroid disease     Ulcer ([facility] [address] 09/08/2010    Rectum.  Ventricular tachyarrhythmia [facility])        Past Surgical History:        Procedure Laterality Date    BACK SURGERY  08/17/2017    CARDIAC CATHETERIZATION  4/22/15  JDT    EF 50%    CARDIAC DEFIBRILLATOR PLACEMENT  04/24/2015    AICD    CARDIAC SURGERY  1/06    Open Heart Surgery (x6)    CATARACT REMOVAL  05    COLON SURGERY  6/26/04    Colon reversal (10/18/2004); Colon re-reversal (10/26/2004).  HERNIA REPAIR  10/26/2006    [facility] [address] [facility]    OTHER SURGICAL HISTORY  11/2004    Bleeding ulcers.  OTHER SURGICAL HISTORY  02/14/2007    Debridement of abdominal/with pulse irrigation.  RETINAL DETACHMENT SURGERY  05/30/2005    RETINAL DETACHMENT SURGERY  06/21/2005    VASCULAR SURGERY  06/05/2017    SJS. Left CFA 5f-6-f destination,aortogram with bilateral lower arteriogram,right SFA/Pop atherectomy CSI 1.25 solid,right SFA /pop balloon balloon angioplasty 6x150 mynx left CFA. Social History:    Social History   Substance Use Topics    Smoking status: Former Smoker    Smokeless tobacco: Never Used    Alcohol use 0.6 oz/week     1 Cans of beer per week      Comment: weekly                                Counseling given: Not Answered      Vital Signs (Current): There were no vitals filed for this visit.                                            BP Readings from Last 3 Encounters:   12/05/17 (!) 159/70   10/18/17 (!) 172/68   10/13/17 (!) 130/50       NPO Status: Time of last liquid consumption: 0000                        Time of last solid consumption: 0000                        Date of last liquid GI/Hepatic/Renal:        (-) GERD       Endo/Other:        (-) hypothyroidism, hyperthyroidism, no Type II DM, no Type I DM               Abdominal:           Vascular:                                      Anesthesia Plan      general     ASA 3     (Decadron/Zofran Intraop)  Induction: intravenous. BIS  MIPS: Postoperative opioids intended and Prophylactic antiemetics administered. Anesthetic plan and risks discussed with patient.                       Joel Woodard MD   12/20/2017

## 2017-12-20 NOTE — PROGRESS NOTES
Bandage to right forearm with scant amount of bloody drainage. Placed in OR after receiving skin tear.

## 2017-12-20 NOTE — BRIEF OP NOTE
Brief Postoperative Note  ______________________________________________________________    Patient: Blake Luz  YOB: 1940  MRN: [id number]  Date of Procedure: 12/20/2017    Pre-Op Diagnosis: spinal stenosis of lumbar region    Post-Op Diagnosis: Same       Procedure(s):  L3-4 decompressive laminectomy using minimally invasive technique    Anesthesia: General    Surgeon(s):  Azeem Hernandez DO    Staff:  Scrub Person First: Vandana Song  Scrub Person Second: Rommel Andrews     Estimated Blood Loss: 20 mL  Complications: None    Specimens:   * No specimens in log *    Implants:  * No implants in log *      Drains:      Findings: Good decompression of the L3-4 level    Azeem Hernandez DO  Date: 12/20/2017  Time: 1:49 PM

## 2017-12-27 ENCOUNTER — OFFICE VISIT (OUTPATIENT)
Dept: NEUROSURGERY | Age: 77
End: 2017-12-27

## 2017-12-27 VITALS
HEIGHT: 72 IN | OXYGEN SATURATION: 96 % | BODY MASS INDEX: 26.28 KG/M2 | HEART RATE: 67 BPM | DIASTOLIC BLOOD PRESSURE: 69 MMHG | SYSTOLIC BLOOD PRESSURE: 145 MMHG | WEIGHT: 194 LBS

## 2017-12-27 DIAGNOSIS — Z48.89 ENCOUNTER FOR POST SURGICAL WOUND CHECK: Primary | ICD-10-CM

## 2017-12-27 DIAGNOSIS — Z98.890 STATUS POST LUMBAR SPINE SURGERY FOR DECOMPRESSION OF SPINAL CORD: ICD-10-CM

## 2017-12-27 PROCEDURE — 99024 POSTOP FOLLOW-UP VISIT: CPT | Performed by: NURSE PRACTITIONER

## 2017-12-27 ASSESSMENT — ENCOUNTER SYMPTOMS
SHORTNESS OF BREATH: 1
GASTROINTESTINAL NEGATIVE: 1
SPUTUM PRODUCTION: 0
COUGH: 0
BACK PAIN: 1
WHEEZING: 0
HEMOPTYSIS: 0
EYES NEGATIVE: 1

## 2017-12-27 NOTE — PROGRESS NOTES
[facility] Office Visit    Patient:   Clifton Amaro  MR#:    [id number]      YOB: 1940  Date of Evaluation:  12/27/2017  Time of Note:                          12:30 PM  Primary/Referring Physician:  Kimani Rae MD   Note Author:   Mikki Noyola CNP        Chief Complaint   Patient presents with    Post-Op Check     12/20/17 - L3-4 decompressive laminectomy using minimally invasive technique       HISTORY OF PRESENT ILLNESS:      Clifton Amaro is a 69 y.o. male who underwent a L3-4 decompressive laminectomy using minimally invasive technique for spinal stenosis of lumbar region on 12/20/2017 and now he is 1 week out from his surgery. Prior to surgery he complained of low back pain at the belt line. The pain did radiate into the left leg intermittently. He denied numbness. He could not stand for very long periods of time, he had frequent falls. Today he states that he is doing okay. He states that he is improving each day. He reports that he is able to stand at the stove and \"white some eggs\". He does complain of low back pain at the incision site which is expected. He states that he is thankful of the staff and he is very appreciative of his care with us. 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 Tyler Edmonds CAD (coronary artery disease)     a. History of open-heart surgery X6, 01/12/06, utilizing the left VANCE to the LAD, sequential vein graft to the intermeditate and obtuse marginal, individually reverse saphenous vein graft to the acute maginal of the PDA and PLOM. b. Stress echocardiogram 04/22/08, negative for myocardial ischemia.     Carotid artery stenosis     Chronic kidney disease     Chronic kidney disease (CKD), stage III (moderate)     COPD (chronic obstructive pulmonary disease) (HCC)     Depression     Gastric ulcer 11/06/2004    multiple, duodenal ulcer - Dr. Latonya Solano History of blood transfusion     Hypercholesterolemia     Hypertension     Thyroid disease     Ulcer ([facility] [address] 09/08/2010    Rectum.  Ventricular tachyarrhythmia [facility])        Past Surgical History:   Procedure Laterality Date    BACK SURGERY  08/17/2017    CARDIAC CATHETERIZATION  4/22/15  JDT    EF 50%    CARDIAC DEFIBRILLATOR PLACEMENT  04/24/2015    AICD    CARDIAC SURGERY  1/06    Open Heart Surgery (x6)    CATARACT REMOVAL  05    COLON SURGERY  6/26/04    Colon reversal (10/18/2004); Colon re-reversal (10/26/2004).  HERNIA REPAIR  10/26/2006    Ileostomy reversal and hernia repair.  KNEE SURGERY  1996    LAMINEC/FACETECT/FORAMIN,CERVICAL 1 SEG N/A 12/20/2017    L3-4 decompressive laminectomy using minimally invasive technique performed by Isai Agarwal DO at [address]  11/2004    Bleeding ulcers.  OTHER SURGICAL HISTORY  02/14/2007    Debridement of abdominal/with pulse irrigation.  RETINAL DETACHMENT SURGERY  05/30/2005    RETINAL DETACHMENT SURGERY  06/21/2005    VASCULAR SURGERY  06/05/2017    SJS. Left CFA 5f-6-f destination,aortogram with bilateral lower arteriogram,right SFA/Pop atherectomy CSI 1.25 solid,right SFA /pop balloon balloon angioplasty 6x150 mynx left CFA.         Medications    Current Outpatient Prescriptions:     HYDROcodone-acetaminophen (NORCO) 7.5-325 MG per tablet, Take one to  1 1/2 every 6 hours as needed for pain., Disp: 30 tablet, Rfl: 0    docusate sodium (COLACE) 100 MG capsule, Take 1 capsule by mouth 2 times daily as needed for Constipation, Disp: 30 capsule, Rfl: 1    ondansetron (ZOFRAN) 4 MG tablet, Take 1 tablet by mouth every 8 hours as needed for Nausea or Vomiting, Disp: 30 tablet, Rfl: 1    bumetanide (BUMEX) 1 MG tablet, Take 1 tablet by mouth 2 times daily, Disp: 60 tablet, Rfl: 1    valsartan (DIOVAN) 160 MG tablet, Take 1 tablet by mouth daily, Neurological: Positive for weakness (legs). Negative for dizziness, tingling, tremors, sensory change, speech change, focal weakness, seizures, loss of consciousness and headaches. Endo/Heme/Allergies: Negative. Psychiatric/Behavioral: Negative. PHYSICAL EXAM:  Vitals:    12/27/17 1102   BP: (!) 145/69   Pulse: 67   SpO2:      Constitutional: The patient appears well-developed and well-nourished. Eyes - conjunctiva normal.  Conjugate gaze  Ear, nose, throat -No scars, masses, or lesions over external nose or ears, no atrophy of tongue  Neck-symmetric, no masses noted, no jugular vein distension  Respiration- chest wall appears symmetric, good expansion, normal effort without use of accessory muscles  Musculoskeletal - no significant wasting of muscles noted, no bony deformities, gait no gross ataxia  Extremities-no clubbing, cyanosis or edema  Skin - warm, dry, and intact. No rash, erythema, or pallor. Psychiatric - mood, affect, and behavior appear normal.     Neurologic Examinaiton  Awake, Alert and oriented x 3  Normal speech pattern, following commands  LARA well  No deficits to light touch     Normal Gait pattern      Wound:  C/D/I, steri strips still intact, no signs or symptoms of infection noted.       DATA and IMAGING:    Lab Results   Component Value Date    WBC 8.9 12/12/2017    HGB 10.5 (L) 12/12/2017    HCT 34.1 (L) 12/12/2017    MCV 90.7 12/12/2017     12/12/2017     Lab Results   Component Value Date     12/12/2017    K 4.8 12/12/2017    CL 99 12/12/2017    CO2 28 12/12/2017    BUN 21 12/12/2017    CREATININE 1.1 12/12/2017    GLUCOSE 127 (H) 12/12/2017    CALCIUM 9.4 12/12/2017    PROT 7.1 12/12/2017    LABALBU 3.9 12/12/2017    BILITOT <0.2 12/12/2017    ALKPHOS 65 12/12/2017    AST 23 12/12/2017    ALT 18 12/12/2017    LABGLOM >60 12/12/2017    GLOB 2.3 04/06/2017     Lab Results   Component Value Date    INR 1.05 12/12/2017    INR 1.07 04/06/2017    INR 1.05 03/14/2016

## 2018-01-04 ENCOUNTER — TELEPHONE (OUTPATIENT)
Dept: CARDIOLOGY | Age: 78
End: 2018-01-04

## 2018-01-16 DIAGNOSIS — Z95.810 AICD (AUTOMATIC CARDIOVERTER/DEFIBRILLATOR) PRESENT: Primary | ICD-10-CM

## 2018-01-16 DIAGNOSIS — I47.20 VT (VENTRICULAR TACHYCARDIA): ICD-10-CM

## 2018-01-16 PROCEDURE — 93295 DEV INTERROG REMOTE 1/2/MLT: CPT | Performed by: INTERNAL MEDICINE

## 2018-01-16 PROCEDURE — 93296 REM INTERROG EVL PM/IDS: CPT | Performed by: INTERNAL MEDICINE

## 2018-01-23 ENCOUNTER — OFFICE VISIT (OUTPATIENT)
Dept: INTERNAL MEDICINE | Age: 78
End: 2018-01-23
Payer: COMMERCIAL

## 2018-01-23 ENCOUNTER — OFFICE VISIT (OUTPATIENT)
Dept: NEUROSURGERY | Age: 78
End: 2018-01-23

## 2018-01-23 VITALS
DIASTOLIC BLOOD PRESSURE: 70 MMHG | BODY MASS INDEX: 26.28 KG/M2 | OXYGEN SATURATION: 94 % | HEIGHT: 72 IN | HEART RATE: 80 BPM | SYSTOLIC BLOOD PRESSURE: 138 MMHG | WEIGHT: 194 LBS

## 2018-01-23 VITALS
BODY MASS INDEX: 26.28 KG/M2 | SYSTOLIC BLOOD PRESSURE: 148 MMHG | DIASTOLIC BLOOD PRESSURE: 70 MMHG | WEIGHT: 194 LBS | RESPIRATION RATE: 16 BRPM | OXYGEN SATURATION: 97 % | HEIGHT: 72 IN | HEART RATE: 78 BPM

## 2018-01-23 DIAGNOSIS — M79.651 BILATERAL THIGH PAIN: ICD-10-CM

## 2018-01-23 DIAGNOSIS — J44.9 CHRONIC OBSTRUCTIVE PULMONARY DISEASE, UNSPECIFIED COPD TYPE (HCC): ICD-10-CM

## 2018-01-23 DIAGNOSIS — S32.040S CLOSED COMPRESSION FRACTURE OF FOURTH LUMBAR VERTEBRA, SEQUELA: ICD-10-CM

## 2018-01-23 DIAGNOSIS — M79.652 BILATERAL THIGH PAIN: ICD-10-CM

## 2018-01-23 DIAGNOSIS — R61 NIGHT SWEATS: ICD-10-CM

## 2018-01-23 DIAGNOSIS — F32.A DEPRESSION, UNSPECIFIED DEPRESSION TYPE: ICD-10-CM

## 2018-01-23 DIAGNOSIS — Z98.890 STATUS POST LUMBAR SPINE SURGERY FOR DECOMPRESSION OF SPINAL CORD: Primary | ICD-10-CM

## 2018-01-23 DIAGNOSIS — I51.89 DIASTOLIC DYSFUNCTION: ICD-10-CM

## 2018-01-23 DIAGNOSIS — I10 ESSENTIAL HYPERTENSION: Primary | ICD-10-CM

## 2018-01-23 DIAGNOSIS — I25.10 CORONARY ARTERY DISEASE INVOLVING NATIVE CORONARY ARTERY OF NATIVE HEART WITHOUT ANGINA PECTORIS: ICD-10-CM

## 2018-01-23 DIAGNOSIS — R29.898 BILATERAL LEG WEAKNESS: ICD-10-CM

## 2018-01-23 PROCEDURE — 99215 OFFICE O/P EST HI 40 MIN: CPT | Performed by: NURSE PRACTITIONER

## 2018-01-23 PROCEDURE — 99024 POSTOP FOLLOW-UP VISIT: CPT | Performed by: NURSE PRACTITIONER

## 2018-01-23 RX ORDER — DULOXETIN HYDROCHLORIDE 30 MG/1
30 CAPSULE, DELAYED RELEASE ORAL DAILY
Qty: 90 CAPSULE | Refills: 1 | Status: SHIPPED | OUTPATIENT
Start: 2018-01-23 | End: 2018-05-08 | Stop reason: SDUPTHER

## 2018-01-23 RX ORDER — VALSARTAN 160 MG/1
160 TABLET ORAL DAILY
Qty: 90 TABLET | Refills: 1 | Status: SHIPPED | OUTPATIENT
Start: 2018-01-23 | End: 2018-05-10 | Stop reason: ALTCHOICE

## 2018-01-23 RX ORDER — BUMETANIDE 1 MG/1
1 TABLET ORAL 2 TIMES DAILY
Qty: 180 TABLET | Refills: 1 | Status: SHIPPED | OUTPATIENT
Start: 2018-01-23 | End: 2019-05-07 | Stop reason: DRUGHIGH

## 2018-01-23 ASSESSMENT — ENCOUNTER SYMPTOMS
EYE ITCHING: 0
BLOOD IN STOOL: 0
EYE DISCHARGE: 0
BACK PAIN: 1
SHORTNESS OF BREATH: 1
RESPIRATORY NEGATIVE: 1
SHORTNESS OF BREATH: 0
EYES NEGATIVE: 1
WHEEZING: 0
TROUBLE SWALLOWING: 0
STRIDOR: 0
COLOR CHANGE: 0
COUGH: 0
CHOKING: 0
CONSTIPATION: 0
SORE THROAT: 0
VOMITING: 0
GASTROINTESTINAL NEGATIVE: 1
SPUTUM PRODUCTION: 0
COUGH: 0
BACK PAIN: 1
DIARRHEA: 0
HEMOPTYSIS: 0
NAUSEA: 0
WHEEZING: 0
ABDOMINAL DISTENTION: 0
ABDOMINAL PAIN: 0

## 2018-01-23 ASSESSMENT — PATIENT HEALTH QUESTIONNAIRE - PHQ9
2. FEELING DOWN, DEPRESSED OR HOPELESS: 0
SUM OF ALL RESPONSES TO PHQ QUESTIONS 1-9: 0
1. LITTLE INTEREST OR PLEASURE IN DOING THINGS: 0
SUM OF ALL RESPONSES TO PHQ9 QUESTIONS 1 & 2: 0

## 2018-01-23 NOTE — PROGRESS NOTES
[facility] INTERNAL MEDICINE  [address]  [address] [place] [address]  Dept: [phone]  Dept Fax: [phone]  Loc: [phone]    Noemi Pablo is a 69 y.o. male who presents today for his medical conditions/complaints as noted below. Noemi Pablo is c/o of Hypertension (Patient here for 3 month follow up visit. ) and Sweats (Patient states having night sweats.)        HPI:     HPI   1. HTN   2. Recheck back surgery; Has gotten much better;  Surgery in Dec;    Decompression laminectomy;  Still has some leg weakness;  Trying to do exercises on his own   3. Depression; Stable on cymbalta  4. CAD  No recent CP;    5. COPD  Only uses nebs as needed ;  Doesn't really use the oxygen at all;   6. CHF; Has good results from the fluid pills;  Breathing is back to normal;   7. Sweats; Has just stopped taking lortab for the back; He is on tylenol 650 mg daily   Chief Complaint   Patient presents with    Hypertension     Patient here for 3 month follow up visit.  Sweats     Patient states having night sweats. 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 Magalie Padilla CAD (coronary artery disease)     a. History of open-heart surgery X6, 01/12/06, utilizing the left VANCE to the LAD, sequential vein graft to the intermeditate and obtuse marginal, individually reverse saphenous vein graft to the acute maginal of the PDA and PLOM. b. Stress echocardiogram 04/22/08, negative for myocardial ischemia.     Carotid artery stenosis     Chronic kidney disease     Chronic kidney disease (CKD), stage III (moderate)     COPD (chronic obstructive pulmonary disease) (HCC)     Depression     Gastric ulcer 11/06/2004    multiple, duodenal ulcer - Dr. Biju Velasco History of blood transfusion     Hypercholesterolemia     Hypertension     HYDROcodone-acetaminophen (NORCO) 7.5-325 MG per tablet Take one to  1 1/2 every 6 hours as needed for pain. 30 tablet 0    docusate sodium (COLACE) 100 MG capsule Take 1 capsule by mouth 2 times daily as needed for Constipation 30 capsule 1    ondansetron (ZOFRAN) 4 MG tablet Take 1 tablet by mouth every 8 hours as needed for Nausea or Vomiting 30 tablet 1    amiodarone (CORDARONE) 200 MG tablet Take 1 tablet by mouth daily 90 tablet 3    isosorbide mononitrate (IMDUR) 60 MG extended release tablet Take 1 tablet by mouth daily 90 tablet 3    KLOR-CON M20 20 MEQ extended release tablet       atorvastatin (LIPITOR) 40 MG tablet Take 40 mg by mouth daily      albuterol (PROVENTIL) (2.5 MG/3ML) 0.083% nebulizer solution Take 2.5 mg by nebulization every 6 hours as needed for Wheezing      Tiotropium Bromide Monohydrate (SPIRIVA HANDIHALER IN) Inhale 1 spray into the lungs daily.  Albuterol Sulfate (PROAIR HFA IN) Inhale 2 puffs into the lungs every 6 hours as needed        No current facility-administered medications for this visit. Allergies   Allergen Reactions    Ativan [Lorazepam] Other (See Comments)    Bactrim     Biaxin [Clarithromycin]     Butamben-Tetracaine-Benzocaine     Demerol     Lorazepam     Sulfa Antibiotics        Health Maintenance   Topic Date Due    DTaP/Tdap/Td vaccine (1 - Tdap) 03/31/1960    Zostavax vaccine  03/31/2001    Pneumococcal low/med risk (2 of 2 - PCV13) 06/28/2017    Flu vaccine (1) 09/01/2017    TSH testing  06/13/2018    Potassium monitoring  12/12/2018    Creatinine monitoring  12/12/2018    Lipid screen  09/07/2022       Subjective:      Review of Systems   Constitutional: Negative for fatigue, fever and unexpected weight change. HENT: Negative for ear discharge, ear pain, mouth sores, sore throat and trouble swallowing. Eyes: Negative for discharge, itching and visual disturbance.    Respiratory: Negative for cough, choking, shortness of breath, wheezing and stridor. Cardiovascular: Negative for chest pain, palpitations and leg swelling. Gastrointestinal: Negative for abdominal distention, abdominal pain, blood in stool, constipation, diarrhea, nausea and vomiting. Endocrine: Negative for cold intolerance, polydipsia and polyuria. Genitourinary: Negative for difficulty urinating, dysuria, frequency and urgency. Musculoskeletal: Positive for back pain. Negative for arthralgias and gait problem. Skin: Negative for color change and rash. Allergic/Immunologic: Negative for food allergies and immunocompromised state. Neurological: Positive for weakness. Negative for dizziness, tremors, syncope, speech difficulty and headaches. Hematological: Negative for adenopathy. Does not bruise/bleed easily. Psychiatric/Behavioral: Negative for confusion and hallucinations. Objective:     Physical Exam   Constitutional: He is oriented to person, place, and time. He appears well-developed and well-nourished. No distress. HENT:   Head: Normocephalic and atraumatic. Eyes: Pupils are equal, round, and reactive to light. Right eye exhibits no discharge. Left eye exhibits no discharge. No scleral icterus. Neck: Normal range of motion. Neck supple. No JVD present. No thyromegaly present. Cardiovascular: Normal rate, regular rhythm and normal heart sounds. No murmur heard. Pulmonary/Chest: Effort normal and breath sounds normal. No respiratory distress. He has no wheezes. He has no rales. Abdominal: Soft. Bowel sounds are normal. He exhibits no distension and no mass. There is no tenderness. There is no rebound and no guarding. Musculoskeletal: Normal range of motion. He exhibits no edema or tenderness. Neurological: He is alert and oriented to person, place, and time. He has normal reflexes. No cranial nerve deficit. Coordination normal.   Skin: Skin is warm and dry. No rash noted. No erythema.    Psychiatric: His behavior is

## 2018-01-23 NOTE — PROGRESS NOTES
[facility] Office Visit    Patient:   Aziza Plunkett  MR#:    [id number]      YOB: 1940  Date of Evaluation:  1/23/2018  Time of Note:                          1:44 PM  Primary/Referring Physician:  KANCHAN Locke   Note Author:   KANCHAN Mcdonald        Chief Complaint   Patient presents with    1 Month Follow-Up     Surgery- 12/20/17 L3-4 decompressive laminectomy using minimally invasive technique. HISTORY OF PRESENT ILLNESS:      Aziza Plunkett is a 69 y.o. male who underwent a L3-4 decompressive laminectomy using minimally invasive technique for spinal stenosis of lumbar region on 12/20/2017 and now he is 1 month out from his surgery. Prior to surgery he complained of low back pain at the belt line. The pain did radiate into the left leg intermittently. He denied numbness. He could not stand for very long periods of time, he had frequent falls. Today he states that he is doing okay, however, he still hurt and aches in his bilateral legs/thighs. He states that he has to lean on something or move if he stands for longer than 4-5 minutes. He does report that it is much better than it was. He also reports that he is \"getting around much better\". He reports that he is off of the pain medications. 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 Natalia Roldan CAD (coronary artery disease)     a. History of open-heart surgery X6, 01/12/06, utilizing the left VANCE to the LAD, sequential vein graft to the intermeditate and obtuse marginal, individually reverse saphenous vein graft to the acute maginal of the PDA and PLOM. b. Stress echocardiogram 04/22/08, negative for myocardial ischemia.     Carotid artery stenosis     Chronic kidney disease     Chronic kidney disease (CKD), stage III (moderate)     COPD (chronic

## 2018-01-23 NOTE — PROGRESS NOTES
Disp: 30 capsule, Rfl: 1    ondansetron (ZOFRAN) 4 MG tablet, Take 1 tablet by mouth every 8 hours as needed for Nausea or Vomiting, Disp: 30 tablet, Rfl: 1    amiodarone (CORDARONE) 200 MG tablet, Take 1 tablet by mouth daily, Disp: 90 tablet, Rfl: 3    isosorbide mononitrate (IMDUR) 60 MG extended release tablet, Take 1 tablet by mouth daily, Disp: 90 tablet, Rfl: 3    KLOR-CON M20 20 MEQ extended release tablet, , Disp: , Rfl:     atorvastatin (LIPITOR) 40 MG tablet, Take 40 mg by mouth daily, Disp: , Rfl:     albuterol (PROVENTIL) (2.5 MG/3ML) 0.083% nebulizer solution, Take 2.5 mg by nebulization every 6 hours as needed for Wheezing, Disp: , Rfl:     Tiotropium Bromide Monohydrate (SPIRIVA HANDIHALER IN), Inhale 1 spray into the lungs daily. , Disp: , Rfl:     Albuterol Sulfate (PROAIR HFA IN), Inhale 2 puffs into the lungs every 6 hours as needed , Disp: , Rfl:   Ativan [lorazepam]; Bactrim; Biaxin [clarithromycin]; Butamben-tetracaine-benzocaine; Demerol; Lorazepam; and Sulfa antibiotics    Social History  History   Smoking Status    Former Smoker   Smokeless Tobacco    Never Used     History   Alcohol Use    0.6 oz/week    1 Cans of beer per week     Comment: weekly         Family History   Problem Relation Age of Onset    Hypertension Mother     Diabetes Father     Dementia Father     Cancer Father     Colon Cancer Father     Hypertension Father     Cancer Brother     Esophageal Cancer Sister        Review of Systems   Constitutional: Negative for chills, diaphoresis, fever, malaise/fatigue and weight loss. HENT: Negative. Eyes: Negative. Respiratory: Positive for shortness of breath. Negative for cough, hemoptysis, sputum production and wheezing. Cardiovascular: Negative. Gastrointestinal: Negative. Genitourinary: Negative. Not getting the \"done\" feeling   Musculoskeletal: Positive for back pain and myalgias. Negative for falls, joint pain and neck pain. PROTIME 13.6 12/12/2017    PROTIME 13.9 04/06/2017    PROTIME 13.4 03/14/2016    No results found. No images to view at today's visit. ASSESSMENT:     Aurora Gtz is a 69 y.o. male who underwent a L3-4 decompressive laminectomy using minimally invasive technique for spinal stenosis of lumbar region on 12/20/2017 and now he is 1 week out from his surgery. PLAN:  -Still no lifting more than 5lbs   -Can now shower and get wound wet  -Follow up in 3 weeks         No diagnosis found.      Faustino Fuller MA

## 2018-03-13 ENCOUNTER — HOSPITAL ENCOUNTER (OUTPATIENT)
Dept: VASCULAR LAB | Age: 78
Discharge: HOME | End: 2018-03-13
Payer: MEDICARE

## 2018-03-13 DIAGNOSIS — I73.9 PVD (PERIPHERAL VASCULAR DISEASE) WITH CLAUDICATION (HCC): ICD-10-CM

## 2018-03-13 DIAGNOSIS — I65.23 BILATERAL CAROTID ARTERY STENOSIS: ICD-10-CM

## 2018-03-13 PROCEDURE — 93880 EXTRACRANIAL BILAT STUDY: CPT

## 2018-03-13 PROCEDURE — 93923 UPR/LXTR ART STDY 3+ LVLS: CPT

## 2018-03-16 ENCOUNTER — TELEPHONE (OUTPATIENT)
Dept: VASCULAR SURGERY | Age: 78
End: 2018-03-16

## 2018-03-16 NOTE — TELEPHONE ENCOUNTER
Graciella Gilford asked that I call the patient to let him know that his arterial scan showed a little worsening in his blood flow. The patient stated that I could speak with his wife about his results and health information. The wife stated that the patient is having worsening claudication and would like to be scheduled for a follow up appointment. I gave her the appt date and time for 3/17/18 at 0900. The pt voiced understanding.

## 2018-03-27 ENCOUNTER — OFFICE VISIT (OUTPATIENT)
Dept: VASCULAR SURGERY | Age: 78
End: 2018-03-27
Payer: MEDICARE

## 2018-03-27 ENCOUNTER — OFFICE VISIT (OUTPATIENT)
Dept: NEUROSURGERY | Age: 78
End: 2018-03-27
Payer: MEDICARE

## 2018-03-27 VITALS
WEIGHT: 195 LBS | SYSTOLIC BLOOD PRESSURE: 126 MMHG | HEIGHT: 72 IN | OXYGEN SATURATION: 99 % | DIASTOLIC BLOOD PRESSURE: 61 MMHG | BODY MASS INDEX: 26.41 KG/M2 | HEART RATE: 71 BPM

## 2018-03-27 VITALS
RESPIRATION RATE: 18 BRPM | TEMPERATURE: 96.9 F | SYSTOLIC BLOOD PRESSURE: 129 MMHG | HEART RATE: 66 BPM | DIASTOLIC BLOOD PRESSURE: 68 MMHG

## 2018-03-27 DIAGNOSIS — R29.898 BILATERAL LEG WEAKNESS: ICD-10-CM

## 2018-03-27 DIAGNOSIS — I70.213 ATHEROSCLEROSIS OF NATIVE ARTERY OF BOTH LOWER EXTREMITIES WITH INTERMITTENT CLAUDICATION (HCC): Primary | ICD-10-CM

## 2018-03-27 DIAGNOSIS — Z01.818 PRE-OP TESTING: Primary | ICD-10-CM

## 2018-03-27 DIAGNOSIS — Z01.818 PRE-OP TESTING: ICD-10-CM

## 2018-03-27 DIAGNOSIS — Z98.890 STATUS POST LUMBAR SPINE SURGERY FOR DECOMPRESSION OF SPINAL CORD: Primary | ICD-10-CM

## 2018-03-27 LAB
ANION GAP SERPL CALCULATED.3IONS-SCNC: 14 MMOL/L (ref 7–19)
BUN BLDV-MCNC: 40 MG/DL (ref 8–23)
CALCIUM SERPL-MCNC: 9.4 MG/DL (ref 8.8–10.2)
CHLORIDE BLD-SCNC: 101 MMOL/L (ref 98–111)
CO2: 23 MMOL/L (ref 22–29)
CREAT SERPL-MCNC: 1.5 MG/DL (ref 0.5–1.2)
GFR NON-AFRICAN AMERICAN: 45
GLUCOSE BLD-MCNC: 100 MG/DL (ref 74–109)
HCT VFR BLD CALC: 34.1 % (ref 42–52)
HEMOGLOBIN: 10.4 G/DL (ref 14–18)
MCH RBC QN AUTO: 27.2 PG (ref 27–31)
MCHC RBC AUTO-ENTMCNC: 30.5 G/DL (ref 33–37)
MCV RBC AUTO: 89.3 FL (ref 80–94)
PDW BLD-RTO: 14.6 % (ref 11.5–14.5)
PLATELET # BLD: 253 K/UL (ref 130–400)
PMV BLD AUTO: 10.1 FL (ref 9.4–12.4)
POTASSIUM SERPL-SCNC: 4.4 MMOL/L (ref 3.5–5)
RBC # BLD: 3.82 M/UL (ref 4.7–6.1)
SODIUM BLD-SCNC: 138 MMOL/L (ref 136–145)
WBC # BLD: 13.7 K/UL (ref 4.8–10.8)

## 2018-03-27 PROCEDURE — 99212 OFFICE O/P EST SF 10 MIN: CPT | Performed by: NURSE PRACTITIONER

## 2018-03-27 PROCEDURE — 99213 OFFICE O/P EST LOW 20 MIN: CPT | Performed by: NEUROLOGICAL SURGERY

## 2018-03-27 RX ORDER — FLUTICASONE FUROATE, UMECLIDINIUM BROMIDE AND VILANTEROL TRIFENATATE 100; 62.5; 25 UG/1; UG/1; UG/1
POWDER RESPIRATORY (INHALATION)
COMMUNITY
Start: 2018-03-26 | End: 2018-04-17

## 2018-03-27 RX ORDER — IPRATROPIUM BROMIDE AND ALBUTEROL SULFATE 2.5; .5 MG/3ML; MG/3ML
SOLUTION RESPIRATORY (INHALATION)
COMMUNITY
Start: 2018-03-22 | End: 2018-04-17

## 2018-03-27 ASSESSMENT — ENCOUNTER SYMPTOMS
WHEEZING: 0
COUGH: 0
GASTROINTESTINAL NEGATIVE: 1
HEMOPTYSIS: 0
EYES NEGATIVE: 1
SHORTNESS OF BREATH: 1
BACK PAIN: 1
SPUTUM PRODUCTION: 0

## 2018-03-27 NOTE — PROGRESS NOTES
Carotid artery stenosis     Chronic kidney disease     Chronic kidney disease (CKD), stage III (moderate)     COPD (chronic obstructive pulmonary disease) ([facility] [address]     Depression     Gastric ulcer 11/06/2004    multiple, duodenal ulcer - Dr. Fernanda Cardenas History of blood transfusion     Hypercholesterolemia     Hypertension     Thyroid disease     Ulcer ([facility] [address] 09/08/2010    Rectum.  Ventricular tachyarrhythmia [facility])        Past Surgical History:   Procedure Laterality Date    BACK SURGERY  08/17/2017    CARDIAC CATHETERIZATION  4/22/15  JDT    EF 50%    CARDIAC DEFIBRILLATOR PLACEMENT  04/24/2015    AICD    CARDIAC SURGERY  1/06    Open Heart Surgery (x6)    CATARACT REMOVAL  05    COLON SURGERY  6/26/04    Colon reversal (10/18/2004); Colon re-reversal (10/26/2004).  HERNIA REPAIR  10/26/2006    Ileostomy reversal and hernia repair.  KNEE SURGERY  1996    LAMINEC/FACETECT/FORAMIN,CERVICAL 1 SEG N/A 12/20/2017    L3-4 decompressive laminectomy using minimally invasive technique performed by Calista Mora DO at [address]  11/2004    Bleeding ulcers.  OTHER SURGICAL HISTORY  02/14/2007    Debridement of abdominal/with pulse irrigation.  RETINAL DETACHMENT SURGERY  05/30/2005    RETINAL DETACHMENT SURGERY  06/21/2005    VASCULAR SURGERY  06/05/2017    SJS. Left CFA 5f-6-f destination,aortogram with bilateral lower arteriogram,right SFA/Pop atherectomy CSI 1.25 solid,right SFA /pop balloon balloon angioplasty 6x150 mynx left CFA.         Medications    Current Outpatient Prescriptions:     Fluticasone-Umeclidin-Vilant (TRELEGY ELLIPTA IN), Inhale into the lungs, Disp: , Rfl:     ipratropium-albuterol (DUONEB) 0.5-2.5 (3) MG/3ML SOLN nebulizer solution, , Disp: , Rfl:     TRELEGY ELLIPTA 100-62.5-25 MCG/INH AEPB, , Disp: , Rfl:     DULoxetine (CYMBALTA) 30 MG extended release capsule, Take 1 capsule by mouth daily, Disp: 90 capsule, Rfl: 1    valsartan (DIOVAN) 160 MG tablet, Take 1 tablet by mouth daily, Disp: 90 tablet, Rfl: 1    bumetanide (BUMEX) 1 MG tablet, Take 1 tablet by mouth 2 times daily, Disp: 180 tablet, Rfl: 1    HYDROcodone-acetaminophen (NORCO) 7.5-325 MG per tablet, Take one to  1 1/2 every 6 hours as needed for pain., Disp: 30 tablet, Rfl: 0    docusate sodium (COLACE) 100 MG capsule, Take 1 capsule by mouth 2 times daily as needed for Constipation, Disp: 30 capsule, Rfl: 1    ondansetron (ZOFRAN) 4 MG tablet, Take 1 tablet by mouth every 8 hours as needed for Nausea or Vomiting, Disp: 30 tablet, Rfl: 1    amiodarone (CORDARONE) 200 MG tablet, Take 1 tablet by mouth daily, Disp: 90 tablet, Rfl: 3    isosorbide mononitrate (IMDUR) 60 MG extended release tablet, Take 1 tablet by mouth daily, Disp: 90 tablet, Rfl: 3    KLOR-CON M20 20 MEQ extended release tablet, , Disp: , Rfl:     atorvastatin (LIPITOR) 40 MG tablet, Take 40 mg by mouth daily, Disp: , Rfl:     albuterol (PROVENTIL) (2.5 MG/3ML) 0.083% nebulizer solution, Take 2.5 mg by nebulization every 6 hours as needed for Wheezing, Disp: , Rfl:     Albuterol Sulfate (PROAIR HFA IN), Inhale 2 puffs into the lungs every 6 hours as needed , Disp: , Rfl:   Ativan [lorazepam]; Bactrim; Biaxin [clarithromycin]; Butamben-tetracaine-benzocaine; Demerol; Lorazepam; and Sulfa antibiotics    Social History  History   Smoking Status    Former Smoker   Smokeless Tobacco    Never Used     History   Alcohol Use    0.6 oz/week    1 Cans of beer per week     Comment: weekly         Family History   Problem Relation Age of Onset    Hypertension Mother     Diabetes Father     Dementia Father     Cancer Father     Colon Cancer Father     Hypertension Father     Cancer Brother     Esophageal Cancer Sister        Review of Systems   Constitutional: Negative for chills, diaphoresis, fever, malaise/fatigue and weight loss. HENT: Negative. Eyes: Negative.     Respiratory: Positive for shortness of breath. Negative for cough, hemoptysis, sputum production and wheezing. Cardiovascular: Negative. Gastrointestinal: Negative. Genitourinary: Negative. Not getting the \"done\" feeling   Musculoskeletal: Positive for back pain and myalgias. Negative for falls, joint pain and neck pain. Skin: Negative. Neurological: Positive for weakness (legs). Negative for dizziness, tingling, tremors, sensory change, speech change, focal weakness, seizures, loss of consciousness and headaches. Endo/Heme/Allergies: Negative. Psychiatric/Behavioral: Negative. PHYSICAL EXAM:  Vitals:    03/27/18 1255   BP: 126/61   Pulse: 71   SpO2: 99%     Constitutional: The patient appears well-developed and well-nourished. Eyes - conjunctiva normal.  Conjugate gaze  Ear, nose, throat -No scars, masses, or lesions over external nose or ears, no atrophy of tongue  Neck-symmetric, no masses noted, no jugular vein distension  Respiration- chest wall appears symmetric, good expansion, normal effort without use of accessory muscles  Musculoskeletal - no significant wasting of muscles noted, no bony deformities, gait no gross ataxia  Extremities-no clubbing, cyanosis or edema  Skin - warm, dry, and intact. No rash, erythema, or pallor. Psychiatric - mood, affect, and behavior appear normal.     Neurologic Examinaiton  Awake, Alert and oriented x 3  Normal speech pattern, following commands  LARA well  No deficits to light touch     Antalgic Gait pattern      Wound:   Well healed       DATA and IMAGING:    Lab Results   Component Value Date    WBC 13.7 (H) 03/27/2018    HGB 10.4 (L) 03/27/2018    HCT 34.1 (L) 03/27/2018    MCV 89.3 03/27/2018     03/27/2018     Lab Results   Component Value Date     03/27/2018    K 4.4 03/27/2018     03/27/2018    CO2 23 03/27/2018    BUN 40 (H) 03/27/2018    CREATININE 1.5 (H) 03/27/2018    GLUCOSE 100 03/27/2018    CALCIUM 9.4 03/27/2018    PROT 7.1 12/12/2017

## 2018-03-28 ENCOUNTER — TELEPHONE (OUTPATIENT)
Dept: VASCULAR SURGERY | Age: 78
End: 2018-03-28

## 2018-03-28 RX ORDER — ACETYLCYSTEINE 100 MG/ML
SOLUTION ORAL; RESPIRATORY (INHALATION)
Qty: 1 ML | Refills: 0 | OUTPATIENT
Start: 2018-03-28 | End: 2018-04-17

## 2018-03-28 NOTE — PROGRESS NOTES
Patient Care Team:  KANCHAN Guaman as PCP - General (Nurse Practitioner Acute Care)  Moe Eller MD (Cardiology)  Sandra Ni MD as Consulting Physician (Vascular Surgery)  Judy Mercado DO as Consulting Physician (Neurosurgery)  KANCHNA Regalado as Nurse Practitioner (Family Nurse Practitioner)      History and Physical    1999 Garo Quiles has a history of peripheral vascular disease of the lower extremities. He has had this for 1 - 5 years. Current treatment includes none. 2000 Garo Quiles has not had new wounds. Recently, he reports claudication at a distance of  25-50 feet. 2000 Garo Quiles reports that the right leg is equal to the left. He reports claudication is worsened and is mostly in the form of crampy type pain starting in the calves. He has a short recovery time. This is reproducible in nature. He reports ischemic rest pain 0 times per night. He reports walking with cart does not help. He does have a history of spinal stenosis and has had recent spinal surgery but states his back is much better. He understands the pain in his legs could be either.       Jessa Youssef is a 69 y.o. male with the following history reviewed and [facility]:  Patient Active Problem List    Diagnosis Date Noted    CAD (coronary artery disease)      Priority: High     01/12/06 CABG x 6  LIMA-LAD, sequential VG-RI & OM, VG-acute maginal, PDA and PLOM Maite Grate, [facility])  4/22/2008  SE  negative for myocardial ischemia  1/9/2012  lexiscan negative for myocardial ischemia, EF 53%  04/22/2015  Symptomatic paroxymal VT (longest 17 beats at 202 bpm, on metroprolol) on  Holter  04/23/2015  Cath  Patent bypass conduits, normal LVFX, no AI, abdominal aortogram ok  04/24/2015  Single lead ICDts,  3/10/16  VT storm  5/5/2017  lexiscan Positive for inferior myocardial ischemia, EF 65%, intermediate risk findings, AUC indication 16, AUC score 7  5/15/17  Cath  Patent LIMA-LAD, patent VG-OM, patent VG-RCA, normal LVFX dysuria, frequency, or urgency. No flank pain or hematuria. Musculoskeletal - has some back pain but improved after recent surgery, has gait disturbance, no myalgia. Skin - no color change, rash, pallor, or new wound. Neurologic - no dizziness, facial asymmetry, or light headedness. No seizures. No speech difficulty or lateralizing weakness. Hematologic - no easy bruising or excessive bleeding. Psychiatric - no severe anxiety or nervousness. No confusion. All other review of systems are negative. Physical Exam    /68 Comment: left  Pulse 66   Temp 96.9 °F (36.1 °C)   Resp 18     Constitutional - well developed, well nourished. No diaphoresis or acute distress. HENT - head normocephalic. Right external ear canal appears normal.  Left external ear canal appears normal.  Septum appears midline. Eyes - conjunctiva normal.  EOMS normal.  No exudate. No icterus. Neck- ROM appears normal, no tracheal deviation. Cardiovascular - Regular rate and rhythm. Heart sounds are normal.  No murmur, rub, or gallop. Carotid pulses are 2+ to palpation bilaterally without bruit. Extremities - Radial and brachial pulses are 2+ to palpation bilaterally. Right femoral pulse: present 2+; Right popliteal pulse: absent Right DP: absent; Right PT absent; Left femoral pulse: present 2+; Left popliteal pulse: absent; Left DP: absent; Left PT: absent No cyanosis, clubbing, or significant edema. No signs atheroembolic event. Pulmonary - effort appears normal.  No respiratory distress. Lungs - Breath sounds normal. No wheezes or rales. GI - Abdomen - soft, non tender, bowel sounds X 4 quadrants. No guarding or rebound tenderness. No distension or palpable mass. Genitourinary - deferred. Musculoskeletal - ROM appears normal.  No significant edema. Neurologic - alert and oriented X 3. Physiologic. Skin - warm, dry, and intact. No rash, erythema, or pallor.   Psychiatric - mood, affect, and behavior

## 2018-03-29 ENCOUNTER — TELEPHONE (OUTPATIENT)
Dept: VASCULAR SURGERY | Age: 78
End: 2018-03-29

## 2018-04-10 ENCOUNTER — HOSPITAL ENCOUNTER (OUTPATIENT)
Dept: VASCULAR LAB | Age: 78
Discharge: HOME | End: 2018-04-10
Payer: MEDICARE

## 2018-04-10 DIAGNOSIS — I70.213 ATHEROSCLEROSIS OF NATIVE ARTERY OF BOTH LOWER EXTREMITIES WITH INTERMITTENT CLAUDICATION (HCC): ICD-10-CM

## 2018-04-10 PROCEDURE — 93926 LOWER EXTREMITY STUDY: CPT

## 2018-04-13 RX ORDER — ATORVASTATIN CALCIUM 40 MG/1
TABLET, FILM COATED ORAL
Qty: 90 TABLET | Refills: 3 | Status: SHIPPED | OUTPATIENT
Start: 2018-04-13

## 2018-04-16 ENCOUNTER — TELEPHONE (OUTPATIENT)
Dept: VASCULAR SURGERY | Age: 78
End: 2018-04-16

## 2018-04-17 ENCOUNTER — HOSPITAL ENCOUNTER (OUTPATIENT)
Dept: GENERAL RADIOLOGY | Age: 78
Discharge: HOME | DRG: 271 | End: 2018-04-17
Payer: MEDICARE

## 2018-04-17 ENCOUNTER — HOSPITAL ENCOUNTER (OUTPATIENT)
Dept: PREADMISSION TESTING | Age: 78
Discharge: HOME | DRG: 271 | End: 2018-04-21
Payer: MEDICARE

## 2018-04-17 VITALS — WEIGHT: 195 LBS | HEIGHT: 72 IN | BODY MASS INDEX: 26.41 KG/M2

## 2018-04-17 DIAGNOSIS — Z95.810 AICD (AUTOMATIC CARDIOVERTER/DEFIBRILLATOR) PRESENT: Primary | ICD-10-CM

## 2018-04-17 DIAGNOSIS — I47.29 NSVT (NONSUSTAINED VENTRICULAR TACHYCARDIA): ICD-10-CM

## 2018-04-17 LAB
ANION GAP SERPL CALCULATED.3IONS-SCNC: 15 MMOL/L (ref 7–19)
APTT: 27.6 SEC (ref 26–36.2)
BASOPHILS ABSOLUTE: 0.1 K/UL (ref 0–0.2)
BASOPHILS RELATIVE PERCENT: 0.7 % (ref 0–1)
BUN BLDV-MCNC: 34 MG/DL (ref 8–23)
CALCIUM SERPL-MCNC: 9.3 MG/DL (ref 8.8–10.2)
CHLORIDE BLD-SCNC: 100 MMOL/L (ref 98–111)
CO2: 25 MMOL/L (ref 22–29)
CREAT SERPL-MCNC: 1.6 MG/DL (ref 0.5–1.2)
EOSINOPHILS ABSOLUTE: 0.3 K/UL (ref 0–0.6)
EOSINOPHILS RELATIVE PERCENT: 2.4 % (ref 0–5)
GFR NON-AFRICAN AMERICAN: 42
GLUCOSE BLD-MCNC: 104 MG/DL (ref 74–109)
HCT VFR BLD CALC: 33.3 % (ref 42–52)
HEMOGLOBIN: 10 G/DL (ref 14–18)
INR BLD: 1.1 (ref 0.88–1.18)
LYMPHOCYTES ABSOLUTE: 2.2 K/UL (ref 1.1–4.5)
LYMPHOCYTES RELATIVE PERCENT: 21 % (ref 20–40)
MCH RBC QN AUTO: 26.2 PG (ref 27–31)
MCHC RBC AUTO-ENTMCNC: 30 G/DL (ref 33–37)
MCV RBC AUTO: 87.4 FL (ref 80–94)
MONOCYTES ABSOLUTE: 1.4 K/UL (ref 0–0.9)
MONOCYTES RELATIVE PERCENT: 13.4 % (ref 0–10)
NEUTROPHILS ABSOLUTE: 6.2 K/UL (ref 1.5–7.5)
NEUTROPHILS RELATIVE PERCENT: 60.2 % (ref 50–65)
PDW BLD-RTO: 14 % (ref 11.5–14.5)
PLATELET # BLD: 271 K/UL (ref 130–400)
PMV BLD AUTO: 9.6 FL (ref 9.4–12.4)
POTASSIUM SERPL-SCNC: 4.4 MMOL/L (ref 3.5–5)
PROTHROMBIN TIME: 14.1 SEC (ref 12–14.6)
RBC # BLD: 3.81 M/UL (ref 4.7–6.1)
SODIUM BLD-SCNC: 140 MMOL/L (ref 136–145)
WBC # BLD: 10.2 K/UL (ref 4.8–10.8)

## 2018-04-17 PROCEDURE — 93296 REM INTERROG EVL PM/IDS: CPT | Performed by: INTERNAL MEDICINE

## 2018-04-17 PROCEDURE — 85025 COMPLETE CBC W/AUTO DIFF WBC: CPT

## 2018-04-17 PROCEDURE — 80048 BASIC METABOLIC PNL TOTAL CA: CPT

## 2018-04-17 PROCEDURE — 93295 DEV INTERROG REMOTE 1/2/MLT: CPT | Performed by: INTERNAL MEDICINE

## 2018-04-17 PROCEDURE — 93005 ELECTROCARDIOGRAM TRACING: CPT

## 2018-04-17 PROCEDURE — 85730 THROMBOPLASTIN TIME PARTIAL: CPT

## 2018-04-17 PROCEDURE — 87070 CULTURE OTHR SPECIMN AEROBIC: CPT

## 2018-04-17 PROCEDURE — 85610 PROTHROMBIN TIME: CPT

## 2018-04-17 PROCEDURE — 71046 X-RAY EXAM CHEST 2 VIEWS: CPT

## 2018-04-18 ENCOUNTER — PREP FOR PROCEDURE (OUTPATIENT)
Dept: VASCULAR SURGERY | Age: 78
End: 2018-04-18

## 2018-04-18 ENCOUNTER — TELEPHONE (OUTPATIENT)
Dept: VASCULAR SURGERY | Age: 78
End: 2018-04-18

## 2018-04-18 LAB
EKG P AXIS: 72 DEGREES
EKG P-R INTERVAL: 208 MS
EKG Q-T INTERVAL: 476 MS
EKG QRS DURATION: 158 MS
EKG QTC CALCULATION (BAZETT): 479 MS
EKG T AXIS: 67 DEGREES
MRSA CULTURE ONLY: ABNORMAL
MRSA CULTURE ONLY: ABNORMAL
ORGANISM: ABNORMAL

## 2018-04-18 RX ORDER — SODIUM CHLORIDE 0.9 % (FLUSH) 0.9 %
10 SYRINGE (ML) INJECTION EVERY 12 HOURS SCHEDULED
Status: CANCELLED | OUTPATIENT
Start: 2018-04-18

## 2018-04-18 RX ORDER — ACETYLCYSTEINE 100 MG/ML
SOLUTION ORAL; RESPIRATORY (INHALATION)
Qty: 1 ML | Refills: 0 | Status: SHIPPED | OUTPATIENT
Start: 2018-04-18 | End: 2018-06-11 | Stop reason: CLARIF

## 2018-04-18 RX ORDER — SODIUM CHLORIDE 0.9 % (FLUSH) 0.9 %
10 SYRINGE (ML) INJECTION PRN
Status: CANCELLED | OUTPATIENT
Start: 2018-04-18

## 2018-04-18 RX ORDER — ASPIRIN 81 MG/1
81 TABLET ORAL ONCE
Status: CANCELLED | OUTPATIENT
Start: 2018-04-18 | End: 2018-04-18

## 2018-04-24 ENCOUNTER — APPOINTMENT (OUTPATIENT)
Dept: INTERVENTIONAL RADIOLOGY/VASCULAR | Age: 78
DRG: 271 | End: 2018-04-24
Attending: SURGERY
Payer: MEDICARE

## 2018-04-24 ENCOUNTER — HOSPITAL ENCOUNTER (INPATIENT)
Age: 78
LOS: 2 days | Discharge: HOME | DRG: 271 | End: 2018-04-26
Attending: SURGERY | Admitting: SURGERY
Payer: MEDICARE

## 2018-04-24 ENCOUNTER — ANESTHESIA (OUTPATIENT)
Dept: OPERATING ROOM | Age: 78
DRG: 271 | End: 2018-04-24
Payer: MEDICARE

## 2018-04-24 ENCOUNTER — ANESTHESIA EVENT (OUTPATIENT)
Dept: OPERATING ROOM | Age: 78
DRG: 271 | End: 2018-04-24
Payer: MEDICARE

## 2018-04-24 VITALS — RESPIRATION RATE: 11 BRPM | OXYGEN SATURATION: 100 % | TEMPERATURE: 97.5 F

## 2018-04-24 PROBLEM — I70.219 ATHEROSCLEROSIS OF LOWER EXTREMITY WITH CLAUDICATION (HCC): Status: ACTIVE | Noted: 2018-04-24

## 2018-04-24 LAB
ABO/RH: NORMAL
ANTIBODY SCREEN: NORMAL
POC ACT LR: 174 SEC
POC ACT LR: 268 SEC

## 2018-04-24 PROCEDURE — 86900 BLOOD TYPING SEROLOGIC ABO: CPT

## 2018-04-24 PROCEDURE — 94664 DEMO&/EVAL PT USE INHALER: CPT

## 2018-04-24 PROCEDURE — 04CK3ZZ EXTIRPATION OF MATTER FROM RIGHT FEMORAL ARTERY, PERCUTANEOUS APPROACH: ICD-10-PCS | Performed by: SURGERY

## 2018-04-24 PROCEDURE — 6360000002 HC RX W HCPCS: Performed by: NURSE ANESTHETIST, CERTIFIED REGISTERED

## 2018-04-24 PROCEDURE — B41FYZZ FLUOROSCOPY OF RIGHT LOWER EXTREMITY ARTERIES USING OTHER CONTRAST: ICD-10-PCS | Performed by: SURGERY

## 2018-04-24 PROCEDURE — 7100000000 HC PACU RECOVERY - FIRST 15 MIN: Performed by: SURGERY

## 2018-04-24 PROCEDURE — 86901 BLOOD TYPING SEROLOGIC RH(D): CPT

## 2018-04-24 PROCEDURE — 85347 COAGULATION TIME ACTIVATED: CPT

## 2018-04-24 PROCEDURE — C1724 CATH, TRANS ATHEREC,ROTATION: HCPCS | Performed by: SURGERY

## 2018-04-24 PROCEDURE — 7100000001 HC PACU RECOVERY - ADDTL 15 MIN: Performed by: SURGERY

## 2018-04-24 PROCEDURE — C1887 CATHETER, GUIDING: HCPCS | Performed by: SURGERY

## 2018-04-24 PROCEDURE — 047K3Z1 DILATION OF RIGHT FEMORAL ARTERY USING DRUG-COATED BALLOON, PERCUTANEOUS APPROACH: ICD-10-PCS | Performed by: SURGERY

## 2018-04-24 PROCEDURE — 04UK07Z SUPPLEMENT RIGHT FEMORAL ARTERY WITH AUTOLOGOUS TISSUE SUBSTITUTE, OPEN APPROACH: ICD-10-PCS | Performed by: SURGERY

## 2018-04-24 PROCEDURE — 2720000000 HC MISC SURG SUPPLY STERILE $0-50: Performed by: SURGERY

## 2018-04-24 PROCEDURE — 3700000001 HC ADD 15 MINUTES (ANESTHESIA): Performed by: SURGERY

## 2018-04-24 PROCEDURE — 36415 COLL VENOUS BLD VENIPUNCTURE: CPT

## 2018-04-24 PROCEDURE — 04CM3ZZ EXTIRPATION OF MATTER FROM RIGHT POPLITEAL ARTERY, PERCUTANEOUS APPROACH: ICD-10-PCS | Performed by: SURGERY

## 2018-04-24 PROCEDURE — 04CK0ZZ EXTIRPATION OF MATTER FROM RIGHT FEMORAL ARTERY, OPEN APPROACH: ICD-10-PCS | Performed by: SURGERY

## 2018-04-24 PROCEDURE — C1894 INTRO/SHEATH, NON-LASER: HCPCS | Performed by: SURGERY

## 2018-04-24 PROCEDURE — 2500000003 HC RX 250 WO HCPCS: Performed by: NURSE ANESTHETIST, CERTIFIED REGISTERED

## 2018-04-24 PROCEDURE — 86850 RBC ANTIBODY SCREEN: CPT

## 2018-04-24 PROCEDURE — 6360000002 HC RX W HCPCS

## 2018-04-24 PROCEDURE — 047M3Z1 DILATION OF RIGHT POPLITEAL ARTERY USING DRUG-COATED BALLOON, PERCUTANEOUS APPROACH: ICD-10-PCS | Performed by: SURGERY

## 2018-04-24 PROCEDURE — 3700000000 HC ANESTHESIA ATTENDED CARE: Performed by: SURGERY

## 2018-04-24 PROCEDURE — 6360000002 HC RX W HCPCS: Performed by: SURGERY

## 2018-04-24 PROCEDURE — C1773 RET DEV, INSERTABLE: HCPCS | Performed by: SURGERY

## 2018-04-24 PROCEDURE — 35302 RECHANNELING OF ARTERY: CPT | Performed by: SURGERY

## 2018-04-24 PROCEDURE — C1769 GUIDE WIRE: HCPCS | Performed by: SURGERY

## 2018-04-24 PROCEDURE — 2580000003 HC RX 258: Performed by: SURGERY

## 2018-04-24 PROCEDURE — 2709999900 HC NON-CHARGEABLE SUPPLY: Performed by: SURGERY

## 2018-04-24 PROCEDURE — 2720000001 HC MISC SURG SUPPLY STERILE $51-500: Performed by: SURGERY

## 2018-04-24 PROCEDURE — 6360000002 HC RX W HCPCS: Performed by: NURSE PRACTITIONER

## 2018-04-24 PROCEDURE — 3600000015 HC SURGERY LEVEL 5 ADDTL 15MIN: Performed by: SURGERY

## 2018-04-24 PROCEDURE — 3600000005 HC SURGERY LEVEL 5 BASE: Performed by: SURGERY

## 2018-04-24 PROCEDURE — 6370000000 HC RX 637 (ALT 250 FOR IP): Performed by: SURGERY

## 2018-04-24 PROCEDURE — 2580000003 HC RX 258: Performed by: NURSE ANESTHETIST, CERTIFIED REGISTERED

## 2018-04-24 PROCEDURE — C2623 CATH, TRANSLUMIN, DRUG-COAT: HCPCS | Performed by: SURGERY

## 2018-04-24 PROCEDURE — C1760 CLOSURE DEV, VASC: HCPCS | Performed by: SURGERY

## 2018-04-24 PROCEDURE — 37225 PR REVSC OPN/PRQ FEM/POP W/ATHRC/ANGIOP SM VSL: CPT | Performed by: SURGERY

## 2018-04-24 PROCEDURE — 2580000003 HC RX 258: Performed by: NURSE PRACTITIONER

## 2018-04-24 PROCEDURE — 1210000000 HC MED SURG R&B

## 2018-04-24 DEVICE — IMPLANTABLE DEVICE: Type: IMPLANTABLE DEVICE | Site: GROIN | Status: FUNCTIONAL

## 2018-04-24 RX ORDER — SODIUM CHLORIDE 0.9 % (FLUSH) 0.9 %
10 SYRINGE (ML) INJECTION EVERY 12 HOURS SCHEDULED
Status: DISCONTINUED | OUTPATIENT
Start: 2018-04-24 | End: 2018-04-24 | Stop reason: HOSPADM

## 2018-04-24 RX ORDER — DULOXETIN HYDROCHLORIDE 30 MG/1
30 CAPSULE, DELAYED RELEASE ORAL DAILY
Status: DISCONTINUED | OUTPATIENT
Start: 2018-04-25 | End: 2018-04-26 | Stop reason: HOSPADM

## 2018-04-24 RX ORDER — ACETYLCYSTEINE 200 MG/ML
600 SOLUTION ORAL; RESPIRATORY (INHALATION) 2 TIMES DAILY
Status: COMPLETED | OUTPATIENT
Start: 2018-04-24 | End: 2018-04-25

## 2018-04-24 RX ORDER — SODIUM CHLORIDE 0.9 % (FLUSH) 0.9 %
10 SYRINGE (ML) INJECTION EVERY 12 HOURS SCHEDULED
Status: DISCONTINUED | OUTPATIENT
Start: 2018-04-24 | End: 2018-04-26 | Stop reason: HOSPADM

## 2018-04-24 RX ORDER — MORPHINE SULFATE 4 MG/ML
2 INJECTION, SOLUTION INTRAMUSCULAR; INTRAVENOUS EVERY 5 MIN PRN
Status: DISCONTINUED | OUTPATIENT
Start: 2018-04-24 | End: 2018-04-24 | Stop reason: HOSPADM

## 2018-04-24 RX ORDER — HYDROCODONE BITARTRATE AND ACETAMINOPHEN 5; 325 MG/1; MG/1
1 TABLET ORAL EVERY 4 HOURS PRN
Status: DISCONTINUED | OUTPATIENT
Start: 2018-04-24 | End: 2018-04-26 | Stop reason: HOSPADM

## 2018-04-24 RX ORDER — POTASSIUM CHLORIDE 20 MEQ/1
20 TABLET, EXTENDED RELEASE ORAL DAILY
Status: DISCONTINUED | OUTPATIENT
Start: 2018-04-24 | End: 2018-04-26 | Stop reason: HOSPADM

## 2018-04-24 RX ORDER — HYDROMORPHONE HCL IN 0.9% NACL 0.5 MG/ML
0.5 SYRINGE (ML) INTRAVENOUS EVERY 5 MIN PRN
Status: DISCONTINUED | OUTPATIENT
Start: 2018-04-24 | End: 2018-04-24 | Stop reason: HOSPADM

## 2018-04-24 RX ORDER — MIDAZOLAM HYDROCHLORIDE 1 MG/ML
INJECTION INTRAMUSCULAR; INTRAVENOUS
Status: COMPLETED
Start: 2018-04-24 | End: 2018-04-24

## 2018-04-24 RX ORDER — HYDROCODONE BITARTRATE AND ACETAMINOPHEN 5; 325 MG/1; MG/1
2 TABLET ORAL EVERY 4 HOURS PRN
Status: DISCONTINUED | OUTPATIENT
Start: 2018-04-24 | End: 2018-04-26 | Stop reason: HOSPADM

## 2018-04-24 RX ORDER — ACETAMINOPHEN 325 MG/1
650 TABLET ORAL EVERY 4 HOURS PRN
Status: DISCONTINUED | OUTPATIENT
Start: 2018-04-24 | End: 2018-04-26 | Stop reason: HOSPADM

## 2018-04-24 RX ORDER — HEPARIN SODIUM 1000 [USP'U]/ML
INJECTION, SOLUTION INTRAVENOUS; SUBCUTANEOUS PRN
Status: DISCONTINUED | OUTPATIENT
Start: 2018-04-24 | End: 2018-04-24 | Stop reason: SDUPTHER

## 2018-04-24 RX ORDER — HYDROMORPHONE HCL IN 0.9% NACL 0.5 MG/ML
0.25 SYRINGE (ML) INTRAVENOUS
Status: DISCONTINUED | OUTPATIENT
Start: 2018-04-24 | End: 2018-04-26 | Stop reason: HOSPADM

## 2018-04-24 RX ORDER — FENTANYL CITRATE 50 UG/ML
INJECTION, SOLUTION INTRAMUSCULAR; INTRAVENOUS PRN
Status: DISCONTINUED | OUTPATIENT
Start: 2018-04-24 | End: 2018-04-24 | Stop reason: SDUPTHER

## 2018-04-24 RX ORDER — HYDRALAZINE HYDROCHLORIDE 20 MG/ML
5 INJECTION INTRAMUSCULAR; INTRAVENOUS EVERY 10 MIN PRN
Status: DISCONTINUED | OUTPATIENT
Start: 2018-04-24 | End: 2018-04-24 | Stop reason: HOSPADM

## 2018-04-24 RX ORDER — SODIUM CHLORIDE 9 MG/ML
INJECTION, SOLUTION INTRAVENOUS CONTINUOUS
Status: ACTIVE | OUTPATIENT
Start: 2018-04-24 | End: 2018-04-25

## 2018-04-24 RX ORDER — VALSARTAN 160 MG/1
160 TABLET ORAL DAILY
Status: DISCONTINUED | OUTPATIENT
Start: 2018-04-24 | End: 2018-04-26 | Stop reason: HOSPADM

## 2018-04-24 RX ORDER — MORPHINE SULFATE 4 MG/ML
4 INJECTION, SOLUTION INTRAMUSCULAR; INTRAVENOUS EVERY 5 MIN PRN
Status: DISCONTINUED | OUTPATIENT
Start: 2018-04-24 | End: 2018-04-24 | Stop reason: HOSPADM

## 2018-04-24 RX ORDER — ROCURONIUM BROMIDE 10 MG/ML
INJECTION, SOLUTION INTRAVENOUS PRN
Status: DISCONTINUED | OUTPATIENT
Start: 2018-04-24 | End: 2018-04-24 | Stop reason: SDUPTHER

## 2018-04-24 RX ORDER — ENALAPRILAT 2.5 MG/2ML
1.25 INJECTION INTRAVENOUS
Status: DISCONTINUED | OUTPATIENT
Start: 2018-04-24 | End: 2018-04-24 | Stop reason: HOSPADM

## 2018-04-24 RX ORDER — MORPHINE SULFATE 10 MG/ML
INJECTION, SOLUTION INTRAMUSCULAR; INTRAVENOUS PRN
Status: DISCONTINUED | OUTPATIENT
Start: 2018-04-24 | End: 2018-04-24 | Stop reason: SDUPTHER

## 2018-04-24 RX ORDER — ASPIRIN 81 MG/1
81 TABLET ORAL ONCE
Status: DISCONTINUED | OUTPATIENT
Start: 2018-04-24 | End: 2018-04-24 | Stop reason: HOSPADM

## 2018-04-24 RX ORDER — ONDANSETRON 2 MG/ML
INJECTION INTRAMUSCULAR; INTRAVENOUS PRN
Status: DISCONTINUED | OUTPATIENT
Start: 2018-04-24 | End: 2018-04-24 | Stop reason: SDUPTHER

## 2018-04-24 RX ORDER — HYDROMORPHONE HCL IN 0.9% NACL 0.5 MG/ML
0.25 SYRINGE (ML) INTRAVENOUS EVERY 5 MIN PRN
Status: DISCONTINUED | OUTPATIENT
Start: 2018-04-24 | End: 2018-04-24 | Stop reason: HOSPADM

## 2018-04-24 RX ORDER — ISOSORBIDE MONONITRATE 60 MG/1
60 TABLET, EXTENDED RELEASE ORAL DAILY
Status: DISCONTINUED | OUTPATIENT
Start: 2018-04-25 | End: 2018-04-26 | Stop reason: HOSPADM

## 2018-04-24 RX ORDER — ATORVASTATIN CALCIUM 40 MG/1
40 TABLET, FILM COATED ORAL DAILY
Status: DISCONTINUED | OUTPATIENT
Start: 2018-04-24 | End: 2018-04-26 | Stop reason: HOSPADM

## 2018-04-24 RX ORDER — AMIODARONE HYDROCHLORIDE 200 MG/1
200 TABLET ORAL DAILY
Status: DISCONTINUED | OUTPATIENT
Start: 2018-04-25 | End: 2018-04-26 | Stop reason: HOSPADM

## 2018-04-24 RX ORDER — PROPOFOL 10 MG/ML
INJECTION, EMULSION INTRAVENOUS PRN
Status: DISCONTINUED | OUTPATIENT
Start: 2018-04-24 | End: 2018-04-24 | Stop reason: SDUPTHER

## 2018-04-24 RX ORDER — LIDOCAINE HYDROCHLORIDE 10 MG/ML
INJECTION, SOLUTION EPIDURAL; INFILTRATION; INTRACAUDAL; PERINEURAL PRN
Status: DISCONTINUED | OUTPATIENT
Start: 2018-04-24 | End: 2018-04-24 | Stop reason: SDUPTHER

## 2018-04-24 RX ORDER — METOCLOPRAMIDE HYDROCHLORIDE 5 MG/ML
10 INJECTION INTRAMUSCULAR; INTRAVENOUS
Status: DISCONTINUED | OUTPATIENT
Start: 2018-04-24 | End: 2018-04-24 | Stop reason: HOSPADM

## 2018-04-24 RX ORDER — ALBUTEROL SULFATE 2.5 MG/3ML
2.5 SOLUTION RESPIRATORY (INHALATION) EVERY 6 HOURS PRN
Status: DISCONTINUED | OUTPATIENT
Start: 2018-04-24 | End: 2018-04-26

## 2018-04-24 RX ORDER — ONDANSETRON 2 MG/ML
4 INJECTION INTRAMUSCULAR; INTRAVENOUS EVERY 6 HOURS PRN
Status: DISCONTINUED | OUTPATIENT
Start: 2018-04-24 | End: 2018-04-26 | Stop reason: HOSPADM

## 2018-04-24 RX ORDER — LIDOCAINE HYDROCHLORIDE 10 MG/ML
1 INJECTION, SOLUTION EPIDURAL; INFILTRATION; INTRACAUDAL; PERINEURAL ONCE
Status: DISCONTINUED | OUTPATIENT
Start: 2018-04-24 | End: 2018-04-24 | Stop reason: HOSPADM

## 2018-04-24 RX ORDER — DIPHENHYDRAMINE HYDROCHLORIDE 50 MG/ML
12.5 INJECTION INTRAMUSCULAR; INTRAVENOUS
Status: DISCONTINUED | OUTPATIENT
Start: 2018-04-24 | End: 2018-04-24 | Stop reason: HOSPADM

## 2018-04-24 RX ORDER — ASPIRIN 81 MG/1
81 TABLET, CHEWABLE ORAL DAILY
Status: DISCONTINUED | OUTPATIENT
Start: 2018-04-25 | End: 2018-04-26 | Stop reason: HOSPADM

## 2018-04-24 RX ORDER — DOCUSATE SODIUM 100 MG/1
100 CAPSULE, LIQUID FILLED ORAL 2 TIMES DAILY PRN
Status: DISCONTINUED | OUTPATIENT
Start: 2018-04-24 | End: 2018-04-26 | Stop reason: HOSPADM

## 2018-04-24 RX ORDER — PROTAMINE SULFATE 10 MG/ML
INJECTION, SOLUTION INTRAVENOUS PRN
Status: DISCONTINUED | OUTPATIENT
Start: 2018-04-24 | End: 2018-04-24 | Stop reason: SDUPTHER

## 2018-04-24 RX ORDER — GLYCOPYRROLATE 0.2 MG/ML
INJECTION INTRAMUSCULAR; INTRAVENOUS PRN
Status: DISCONTINUED | OUTPATIENT
Start: 2018-04-24 | End: 2018-04-24 | Stop reason: SDUPTHER

## 2018-04-24 RX ORDER — SODIUM CHLORIDE 0.9 % (FLUSH) 0.9 %
10 SYRINGE (ML) INJECTION PRN
Status: DISCONTINUED | OUTPATIENT
Start: 2018-04-24 | End: 2018-04-24 | Stop reason: HOSPADM

## 2018-04-24 RX ORDER — SODIUM CHLORIDE 0.9 % (FLUSH) 0.9 %
10 SYRINGE (ML) INJECTION PRN
Status: DISCONTINUED | OUTPATIENT
Start: 2018-04-24 | End: 2018-04-26 | Stop reason: HOSPADM

## 2018-04-24 RX ORDER — HYDROMORPHONE HCL IN 0.9% NACL 0.5 MG/ML
0.5 SYRINGE (ML) INTRAVENOUS
Status: DISCONTINUED | OUTPATIENT
Start: 2018-04-24 | End: 2018-04-26 | Stop reason: HOSPADM

## 2018-04-24 RX ORDER — BUMETANIDE 0.5 MG/1
1 TABLET ORAL DAILY
Status: DISCONTINUED | OUTPATIENT
Start: 2018-04-24 | End: 2018-04-26 | Stop reason: HOSPADM

## 2018-04-24 RX ORDER — SODIUM CHLORIDE, SODIUM LACTATE, POTASSIUM CHLORIDE, CALCIUM CHLORIDE 600; 310; 30; 20 MG/100ML; MG/100ML; MG/100ML; MG/100ML
INJECTION, SOLUTION INTRAVENOUS CONTINUOUS
Status: DISCONTINUED | OUTPATIENT
Start: 2018-04-24 | End: 2018-04-24

## 2018-04-24 RX ORDER — CLONIDINE HYDROCHLORIDE 0.1 MG/1
0.1 TABLET ORAL
Status: DISCONTINUED | OUTPATIENT
Start: 2018-04-24 | End: 2018-04-26 | Stop reason: HOSPADM

## 2018-04-24 RX ORDER — PROMETHAZINE HYDROCHLORIDE 25 MG/ML
6.25 INJECTION, SOLUTION INTRAMUSCULAR; INTRAVENOUS
Status: DISCONTINUED | OUTPATIENT
Start: 2018-04-24 | End: 2018-04-24 | Stop reason: HOSPADM

## 2018-04-24 RX ORDER — LABETALOL HYDROCHLORIDE 5 MG/ML
5 INJECTION, SOLUTION INTRAVENOUS EVERY 10 MIN PRN
Status: DISCONTINUED | OUTPATIENT
Start: 2018-04-24 | End: 2018-04-24 | Stop reason: HOSPADM

## 2018-04-24 RX ADMIN — ROCURONIUM BROMIDE 20 MG: 10 INJECTION INTRAVENOUS at 14:00

## 2018-04-24 RX ADMIN — PROPOFOL 90 MG: 10 INJECTION, EMULSION INTRAVENOUS at 12:13

## 2018-04-24 RX ADMIN — LIDOCAINE HYDROCHLORIDE 5 ML: 10 INJECTION, SOLUTION EPIDURAL; INFILTRATION; INTRACAUDAL; PERINEURAL at 12:13

## 2018-04-24 RX ADMIN — Medication 0.5 MG: at 20:31

## 2018-04-24 RX ADMIN — MORPHINE SULFATE 2 MG: 10 INJECTION INTRAMUSCULAR; INTRAVENOUS; SUBCUTANEOUS at 15:38

## 2018-04-24 RX ADMIN — PROTAMINE SULFATE 30 MG: 10 INJECTION, SOLUTION INTRAVENOUS at 15:30

## 2018-04-24 RX ADMIN — GLYCOPYRROLATE 0.6 MG: 0.2 INJECTION, SOLUTION INTRAMUSCULAR; INTRAVENOUS at 15:51

## 2018-04-24 RX ADMIN — Medication 10 ML: at 20:10

## 2018-04-24 RX ADMIN — POTASSIUM CHLORIDE 20 MEQ: 20 TABLET, EXTENDED RELEASE ORAL at 20:09

## 2018-04-24 RX ADMIN — HEPARIN SODIUM 3000 UNITS: 1000 INJECTION, SOLUTION INTRAVENOUS; SUBCUTANEOUS at 13:35

## 2018-04-24 RX ADMIN — SODIUM CHLORIDE: 9 INJECTION, SOLUTION INTRAVENOUS at 20:11

## 2018-04-24 RX ADMIN — HEPARIN SODIUM 1000 UNITS: 1000 INJECTION, SOLUTION INTRAVENOUS; SUBCUTANEOUS at 13:58

## 2018-04-24 RX ADMIN — HYDROCODONE BITARTRATE AND ACETAMINOPHEN 2 TABLET: 5; 325 TABLET ORAL at 18:41

## 2018-04-24 RX ADMIN — MIDAZOLAM HYDROCHLORIDE 1 MG: 2 INJECTION, SOLUTION INTRAMUSCULAR; INTRAVENOUS at 11:38

## 2018-04-24 RX ADMIN — ROCURONIUM BROMIDE 50 MG: 10 INJECTION INTRAVENOUS at 12:13

## 2018-04-24 RX ADMIN — FENTANYL CITRATE 50 MCG: 50 INJECTION, SOLUTION INTRAMUSCULAR; INTRAVENOUS at 12:20

## 2018-04-24 RX ADMIN — ATORVASTATIN CALCIUM 40 MG: 40 TABLET, FILM COATED ORAL at 20:09

## 2018-04-24 RX ADMIN — BUMETANIDE 1 MG: 0.5 TABLET ORAL at 20:09

## 2018-04-24 RX ADMIN — PHENYLEPHRINE HYDROCHLORIDE 50 MCG/MIN: 10 INJECTION INTRAVENOUS at 12:36

## 2018-04-24 RX ADMIN — VALSARTAN 160 MG: 160 TABLET ORAL at 20:09

## 2018-04-24 RX ADMIN — HEPARIN SODIUM 6000 UNITS: 1000 INJECTION, SOLUTION INTRAVENOUS; SUBCUTANEOUS at 13:09

## 2018-04-24 RX ADMIN — MORPHINE SULFATE 2 MG: 10 INJECTION INTRAMUSCULAR; INTRAVENOUS; SUBCUTANEOUS at 15:43

## 2018-04-24 RX ADMIN — SODIUM CHLORIDE, SODIUM LACTATE, POTASSIUM CHLORIDE, AND CALCIUM CHLORIDE: 600; 310; 30; 20 INJECTION, SOLUTION INTRAVENOUS at 13:48

## 2018-04-24 RX ADMIN — FENTANYL CITRATE 100 MCG: 50 INJECTION, SOLUTION INTRAMUSCULAR; INTRAVENOUS at 12:13

## 2018-04-24 RX ADMIN — ACETYLCYSTEINE 600 MG: 200 INHALANT RESPIRATORY (INHALATION) at 20:09

## 2018-04-24 RX ADMIN — VANCOMYCIN HYDROCHLORIDE 1500 MG: 10 INJECTION, POWDER, LYOPHILIZED, FOR SOLUTION INTRAVENOUS at 11:19

## 2018-04-24 RX ADMIN — ONDANSETRON HYDROCHLORIDE 4 MG: 2 SOLUTION INTRAMUSCULAR; INTRAVENOUS at 15:25

## 2018-04-24 RX ADMIN — MIDAZOLAM HYDROCHLORIDE 1 MG: 2 INJECTION, SOLUTION INTRAMUSCULAR; INTRAVENOUS at 11:42

## 2018-04-24 RX ADMIN — MORPHINE SULFATE 2 MG: 10 INJECTION INTRAMUSCULAR; INTRAVENOUS; SUBCUTANEOUS at 15:35

## 2018-04-24 RX ADMIN — NEOSTIGMINE METHYLSULFATE 4 MG: 1 INJECTION, SOLUTION INTRAMUSCULAR; INTRAVENOUS; SUBCUTANEOUS at 15:51

## 2018-04-24 RX ADMIN — SODIUM CHLORIDE, SODIUM LACTATE, POTASSIUM CHLORIDE, AND CALCIUM CHLORIDE: 600; 310; 30; 20 INJECTION, SOLUTION INTRAVENOUS at 08:12

## 2018-04-24 ASSESSMENT — PAIN SCALES - GENERAL
PAINLEVEL_OUTOF10: 0
PAINLEVEL_OUTOF10: 9
PAINLEVEL_OUTOF10: 7

## 2018-04-24 ASSESSMENT — COPD QUESTIONNAIRES: CAT_SEVERITY: SEVERE

## 2018-04-24 ASSESSMENT — LIFESTYLE VARIABLES: SMOKING_STATUS: 0

## 2018-04-24 ASSESSMENT — ENCOUNTER SYMPTOMS: SHORTNESS OF BREATH: 1

## 2018-04-24 ASSESSMENT — PAIN - FUNCTIONAL ASSESSMENT: PAIN_FUNCTIONAL_ASSESSMENT: 0-10

## 2018-04-25 LAB
ANION GAP SERPL CALCULATED.3IONS-SCNC: 14 MMOL/L (ref 7–19)
BUN BLDV-MCNC: 16 MG/DL (ref 8–23)
CALCIUM SERPL-MCNC: 8.3 MG/DL (ref 8.8–10.2)
CHLORIDE BLD-SCNC: 105 MMOL/L (ref 98–111)
CO2: 22 MMOL/L (ref 22–29)
CREAT SERPL-MCNC: 1.2 MG/DL (ref 0.5–1.2)
GFR NON-AFRICAN AMERICAN: 59
GLUCOSE BLD-MCNC: 149 MG/DL (ref 74–109)
GLUCOSE BLD-MCNC: 166 MG/DL (ref 70–99)
GLUCOSE BLD-MCNC: 194 MG/DL (ref 70–99)
GLUCOSE BLD-MCNC: 208 MG/DL (ref 70–99)
HCT VFR BLD CALC: 31.1 % (ref 42–52)
HCT VFR BLD CALC: 33.2 % (ref 42–52)
HEMOGLOBIN: 9.4 G/DL (ref 14–18)
HEMOGLOBIN: 9.8 G/DL (ref 14–18)
MCH RBC QN AUTO: 26.1 PG (ref 27–31)
MCH RBC QN AUTO: 27.1 PG (ref 27–31)
MCHC RBC AUTO-ENTMCNC: 29.5 G/DL (ref 33–37)
MCHC RBC AUTO-ENTMCNC: 30.2 G/DL (ref 33–37)
MCV RBC AUTO: 88.5 FL (ref 80–94)
MCV RBC AUTO: 89.6 FL (ref 80–94)
PDW BLD-RTO: 14.3 % (ref 11.5–14.5)
PDW BLD-RTO: 14.3 % (ref 11.5–14.5)
PERFORMED ON: ABNORMAL
PLATELET # BLD: 177 K/UL (ref 130–400)
PLATELET # BLD: 219 K/UL (ref 130–400)
PMV BLD AUTO: 9.6 FL (ref 9.4–12.4)
PMV BLD AUTO: 9.7 FL (ref 9.4–12.4)
POTASSIUM REFLEX MAGNESIUM: 4.5 MMOL/L (ref 3.5–5)
RBC # BLD: 3.47 M/UL (ref 4.7–6.1)
RBC # BLD: 3.75 M/UL (ref 4.7–6.1)
SODIUM BLD-SCNC: 141 MMOL/L (ref 136–145)
WBC # BLD: 10.6 K/UL (ref 4.8–10.8)
WBC # BLD: 14.6 K/UL (ref 4.8–10.8)

## 2018-04-25 PROCEDURE — 2580000003 HC RX 258: Performed by: SURGERY

## 2018-04-25 PROCEDURE — 99024 POSTOP FOLLOW-UP VISIT: CPT | Performed by: SURGERY

## 2018-04-25 PROCEDURE — 6370000000 HC RX 637 (ALT 250 FOR IP): Performed by: SURGERY

## 2018-04-25 PROCEDURE — 6360000002 HC RX W HCPCS: Performed by: SURGERY

## 2018-04-25 PROCEDURE — 36415 COLL VENOUS BLD VENIPUNCTURE: CPT

## 2018-04-25 PROCEDURE — 2700000000 HC OXYGEN THERAPY PER DAY

## 2018-04-25 PROCEDURE — 80048 BASIC METABOLIC PNL TOTAL CA: CPT

## 2018-04-25 PROCEDURE — 1210000000 HC MED SURG R&B

## 2018-04-25 PROCEDURE — 82948 REAGENT STRIP/BLOOD GLUCOSE: CPT

## 2018-04-25 PROCEDURE — 85027 COMPLETE CBC AUTOMATED: CPT

## 2018-04-25 RX ADMIN — ONDANSETRON 4 MG: 2 INJECTION INTRAMUSCULAR; INTRAVENOUS at 10:04

## 2018-04-25 RX ADMIN — POTASSIUM CHLORIDE 20 MEQ: 20 TABLET, EXTENDED RELEASE ORAL at 09:51

## 2018-04-25 RX ADMIN — DULOXETINE HYDROCHLORIDE 30 MG: 30 CAPSULE, DELAYED RELEASE ORAL at 09:50

## 2018-04-25 RX ADMIN — VALSARTAN 160 MG: 160 TABLET ORAL at 09:50

## 2018-04-25 RX ADMIN — HYDROCODONE BITARTRATE AND ACETAMINOPHEN 2 TABLET: 5; 325 TABLET ORAL at 09:50

## 2018-04-25 RX ADMIN — Medication 10 ML: at 19:44

## 2018-04-25 RX ADMIN — AMIODARONE HYDROCHLORIDE 200 MG: 200 TABLET ORAL at 09:51

## 2018-04-25 RX ADMIN — ATORVASTATIN CALCIUM 40 MG: 40 TABLET, FILM COATED ORAL at 09:51

## 2018-04-25 RX ADMIN — ISOSORBIDE MONONITRATE 60 MG: 60 TABLET, EXTENDED RELEASE ORAL at 09:51

## 2018-04-25 RX ADMIN — ASPIRIN 81 MG CHEWABLE TABLET 81 MG: 81 TABLET CHEWABLE at 09:50

## 2018-04-25 RX ADMIN — ACETYLCYSTEINE 600 MG: 200 INHALANT RESPIRATORY (INHALATION) at 10:25

## 2018-04-25 RX ADMIN — ONDANSETRON 4 MG: 2 INJECTION INTRAMUSCULAR; INTRAVENOUS at 01:27

## 2018-04-25 RX ADMIN — Medication 10 ML: at 09:53

## 2018-04-25 RX ADMIN — ACETYLCYSTEINE 600 MG: 200 INHALANT RESPIRATORY (INHALATION) at 19:43

## 2018-04-25 RX ADMIN — BUMETANIDE 1 MG: 0.5 TABLET ORAL at 09:51

## 2018-04-25 RX ADMIN — ONDANSETRON 4 MG: 2 INJECTION INTRAMUSCULAR; INTRAVENOUS at 18:56

## 2018-04-25 RX ADMIN — ACETAMINOPHEN 650 MG: 325 TABLET ORAL at 17:02

## 2018-04-25 RX ADMIN — Medication 0.5 MG: at 01:28

## 2018-04-25 ASSESSMENT — PAIN SCALES - GENERAL
PAINLEVEL_OUTOF10: 10
PAINLEVEL_OUTOF10: 7
PAINLEVEL_OUTOF10: 10

## 2018-04-26 VITALS
DIASTOLIC BLOOD PRESSURE: 73 MMHG | BODY MASS INDEX: 26.41 KG/M2 | HEART RATE: 67 BPM | SYSTOLIC BLOOD PRESSURE: 174 MMHG | RESPIRATION RATE: 18 BRPM | HEIGHT: 72 IN | WEIGHT: 195 LBS | TEMPERATURE: 97 F | OXYGEN SATURATION: 97 %

## 2018-04-26 LAB
ALBUMIN SERPL-MCNC: 3.4 G/DL (ref 3.5–5.2)
ALP BLD-CCNC: 59 U/L (ref 40–130)
ALT SERPL-CCNC: 36 U/L (ref 5–41)
ANION GAP SERPL CALCULATED.3IONS-SCNC: 17 MMOL/L (ref 7–19)
AST SERPL-CCNC: 37 U/L (ref 5–40)
BILIRUB SERPL-MCNC: 0.4 MG/DL (ref 0.2–1.2)
BUN BLDV-MCNC: 30 MG/DL (ref 8–23)
CALCIUM SERPL-MCNC: 9.3 MG/DL (ref 8.8–10.2)
CHLORIDE BLD-SCNC: 99 MMOL/L (ref 98–111)
CO2: 20 MMOL/L (ref 22–29)
CREAT SERPL-MCNC: 1.6 MG/DL (ref 0.5–1.2)
GFR NON-AFRICAN AMERICAN: 42
GLUCOSE BLD-MCNC: 184 MG/DL (ref 74–109)
HCT VFR BLD CALC: 33.3 % (ref 42–52)
HEMOGLOBIN: 9.9 G/DL (ref 14–18)
MCH RBC QN AUTO: 26.6 PG (ref 27–31)
MCHC RBC AUTO-ENTMCNC: 29.7 G/DL (ref 33–37)
MCV RBC AUTO: 89.5 FL (ref 80–94)
PDW BLD-RTO: 14 % (ref 11.5–14.5)
PLATELET # BLD: 208 K/UL (ref 130–400)
PMV BLD AUTO: 10.2 FL (ref 9.4–12.4)
POTASSIUM SERPL-SCNC: 5.3 MMOL/L (ref 3.5–5)
RBC # BLD: 3.72 M/UL (ref 4.7–6.1)
SODIUM BLD-SCNC: 136 MMOL/L (ref 136–145)
TOTAL PROTEIN: 7 G/DL (ref 6.6–8.7)
WBC # BLD: 10.6 K/UL (ref 4.8–10.8)

## 2018-04-26 PROCEDURE — 85027 COMPLETE CBC AUTOMATED: CPT

## 2018-04-26 PROCEDURE — 94640 AIRWAY INHALATION TREATMENT: CPT

## 2018-04-26 PROCEDURE — 6370000000 HC RX 637 (ALT 250 FOR IP): Performed by: SURGERY

## 2018-04-26 PROCEDURE — 99024 POSTOP FOLLOW-UP VISIT: CPT | Performed by: SURGERY

## 2018-04-26 PROCEDURE — 2580000003 HC RX 258: Performed by: SURGERY

## 2018-04-26 PROCEDURE — 6360000002 HC RX W HCPCS: Performed by: SURGERY

## 2018-04-26 PROCEDURE — 36415 COLL VENOUS BLD VENIPUNCTURE: CPT

## 2018-04-26 PROCEDURE — 80053 COMPREHEN METABOLIC PANEL: CPT

## 2018-04-26 RX ORDER — ALBUTEROL SULFATE 2.5 MG/3ML
2.5 SOLUTION RESPIRATORY (INHALATION) EVERY 6 HOURS
Status: DISCONTINUED | OUTPATIENT
Start: 2018-04-26 | End: 2018-04-26 | Stop reason: HOSPADM

## 2018-04-26 RX ORDER — FUROSEMIDE 10 MG/ML
20 INJECTION INTRAMUSCULAR; INTRAVENOUS ONCE
Status: DISCONTINUED | OUTPATIENT
Start: 2018-04-26 | End: 2018-04-26 | Stop reason: HOSPADM

## 2018-04-26 RX ADMIN — METOPROLOL TARTRATE 25 MG: 25 TABLET ORAL at 01:00

## 2018-04-26 RX ADMIN — CLONIDINE HYDROCHLORIDE 0.1 MG: 0.1 TABLET ORAL at 07:42

## 2018-04-26 RX ADMIN — Medication 10 ML: at 08:33

## 2018-04-26 RX ADMIN — POTASSIUM CHLORIDE 20 MEQ: 20 TABLET, EXTENDED RELEASE ORAL at 08:32

## 2018-04-26 RX ADMIN — VALSARTAN 160 MG: 160 TABLET ORAL at 06:38

## 2018-04-26 RX ADMIN — ASPIRIN 81 MG CHEWABLE TABLET 81 MG: 81 TABLET CHEWABLE at 08:32

## 2018-04-26 RX ADMIN — HYDROCODONE BITARTRATE AND ACETAMINOPHEN 1 TABLET: 5; 325 TABLET ORAL at 08:44

## 2018-04-26 RX ADMIN — ATORVASTATIN CALCIUM 40 MG: 40 TABLET, FILM COATED ORAL at 08:32

## 2018-04-26 RX ADMIN — ISOSORBIDE MONONITRATE 60 MG: 60 TABLET, EXTENDED RELEASE ORAL at 06:38

## 2018-04-26 RX ADMIN — AMIODARONE HYDROCHLORIDE 200 MG: 200 TABLET ORAL at 08:32

## 2018-04-26 RX ADMIN — CLONIDINE HYDROCHLORIDE 0.1 MG: 0.1 TABLET ORAL at 03:08

## 2018-04-26 RX ADMIN — ALBUTEROL SULFATE 2.5 MG: 2.5 SOLUTION RESPIRATORY (INHALATION) at 09:00

## 2018-04-26 RX ADMIN — DULOXETINE HYDROCHLORIDE 30 MG: 30 CAPSULE, DELAYED RELEASE ORAL at 08:32

## 2018-04-26 RX ADMIN — BUMETANIDE 1 MG: 0.5 TABLET ORAL at 08:32

## 2018-04-26 RX ADMIN — CLONIDINE HYDROCHLORIDE 0.1 MG: 0.1 TABLET ORAL at 05:07

## 2018-04-26 ASSESSMENT — PAIN SCALES - GENERAL: PAINLEVEL_OUTOF10: 6

## 2018-04-30 ENCOUNTER — TELEPHONE (OUTPATIENT)
Dept: INTERNAL MEDICINE | Age: 78
End: 2018-04-30

## 2018-05-08 ENCOUNTER — OFFICE VISIT (OUTPATIENT)
Dept: INTERNAL MEDICINE | Age: 78
End: 2018-05-08
Payer: MEDICARE

## 2018-05-08 ENCOUNTER — HOSPITAL ENCOUNTER (OUTPATIENT)
Dept: GENERAL RADIOLOGY | Age: 78
Discharge: HOME | End: 2018-05-08
Payer: MEDICARE

## 2018-05-08 VITALS
HEART RATE: 76 BPM | DIASTOLIC BLOOD PRESSURE: 58 MMHG | RESPIRATION RATE: 16 BRPM | SYSTOLIC BLOOD PRESSURE: 128 MMHG | OXYGEN SATURATION: 97 % | HEIGHT: 72 IN | WEIGHT: 182 LBS | BODY MASS INDEX: 24.65 KG/M2

## 2018-05-08 DIAGNOSIS — I50.9 CONGESTIVE HEART FAILURE, UNSPECIFIED CONGESTIVE HEART FAILURE CHRONICITY, UNSPECIFIED CONGESTIVE HEART FAILURE TYPE: ICD-10-CM

## 2018-05-08 DIAGNOSIS — F32.9 REACTIVE DEPRESSION: ICD-10-CM

## 2018-05-08 DIAGNOSIS — N18.30 CHRONIC KIDNEY DISEASE (CKD), STAGE III (MODERATE) (HCC): ICD-10-CM

## 2018-05-08 DIAGNOSIS — R06.02 SOB (SHORTNESS OF BREATH): ICD-10-CM

## 2018-05-08 DIAGNOSIS — I70.219 ATHEROSCLEROSIS OF LOWER EXTREMITY WITH CLAUDICATION (HCC): ICD-10-CM

## 2018-05-08 DIAGNOSIS — I50.9 CONGESTIVE HEART FAILURE, UNSPECIFIED CONGESTIVE HEART FAILURE CHRONICITY, UNSPECIFIED CONGESTIVE HEART FAILURE TYPE: Primary | ICD-10-CM

## 2018-05-08 DIAGNOSIS — D50.0 IRON DEFICIENCY ANEMIA DUE TO CHRONIC BLOOD LOSS: ICD-10-CM

## 2018-05-08 PROCEDURE — 71046 X-RAY EXAM CHEST 2 VIEWS: CPT

## 2018-05-08 PROCEDURE — 1111F DSCHRG MED/CURRENT MED MERGE: CPT | Performed by: NURSE PRACTITIONER

## 2018-05-08 PROCEDURE — 99495 TRANSJ CARE MGMT MOD F2F 14D: CPT | Performed by: NURSE PRACTITIONER

## 2018-05-08 RX ORDER — CEPHALEXIN 500 MG/1
CAPSULE ORAL
COMMUNITY
Start: 2018-05-07 | End: 2018-05-22 | Stop reason: ALTCHOICE

## 2018-05-08 RX ORDER — FLUCONAZOLE 200 MG/1
TABLET ORAL
COMMUNITY
Start: 2018-05-02 | End: 2018-07-23 | Stop reason: ALTCHOICE

## 2018-05-08 RX ORDER — DULOXETIN HYDROCHLORIDE 30 MG/1
60 CAPSULE, DELAYED RELEASE ORAL DAILY
Qty: 90 CAPSULE | Refills: 1
Start: 2018-05-08 | End: 2018-06-29 | Stop reason: CLARIF

## 2018-05-08 ASSESSMENT — ENCOUNTER SYMPTOMS
SHORTNESS OF BREATH: 1
TROUBLE SWALLOWING: 0
COLOR CHANGE: 0
CONSTIPATION: 0
BLOOD IN STOOL: 0
EYE ITCHING: 0
COUGH: 0
WHEEZING: 0
STRIDOR: 0
ABDOMINAL DISTENTION: 0
EYE DISCHARGE: 0
SORE THROAT: 0
DIARRHEA: 0
CHOKING: 0
VOMITING: 0
NAUSEA: 0
ABDOMINAL PAIN: 0

## 2018-05-09 ENCOUNTER — TELEPHONE (OUTPATIENT)
Dept: INTERNAL MEDICINE CLINIC | Age: 78
End: 2018-05-09

## 2018-05-10 ENCOUNTER — OFFICE VISIT (OUTPATIENT)
Dept: CARDIOLOGY | Age: 78
End: 2018-05-10
Payer: MEDICARE

## 2018-05-10 ENCOUNTER — OFFICE VISIT (OUTPATIENT)
Dept: VASCULAR SURGERY | Age: 78
End: 2018-05-10

## 2018-05-10 VITALS
SYSTOLIC BLOOD PRESSURE: 132 MMHG | TEMPERATURE: 96.6 F | RESPIRATION RATE: 18 BRPM | HEART RATE: 76 BPM | DIASTOLIC BLOOD PRESSURE: 65 MMHG

## 2018-05-10 VITALS
BODY MASS INDEX: 24.52 KG/M2 | HEIGHT: 72 IN | SYSTOLIC BLOOD PRESSURE: 118 MMHG | DIASTOLIC BLOOD PRESSURE: 62 MMHG | WEIGHT: 181 LBS | HEART RATE: 80 BPM

## 2018-05-10 DIAGNOSIS — I70.213 ATHEROSCLEROSIS OF NATIVE ARTERY OF BOTH LOWER EXTREMITIES WITH INTERMITTENT CLAUDICATION (HCC): Primary | ICD-10-CM

## 2018-05-10 DIAGNOSIS — I25.10 CORONARY ARTERY DISEASE INVOLVING NATIVE CORONARY ARTERY OF NATIVE HEART WITHOUT ANGINA PECTORIS: Primary | ICD-10-CM

## 2018-05-10 DIAGNOSIS — Z95.810 AICD (AUTOMATIC CARDIOVERTER/DEFIBRILLATOR) PRESENT: ICD-10-CM

## 2018-05-10 DIAGNOSIS — N18.30 CHRONIC KIDNEY DISEASE (CKD), STAGE III (MODERATE) (HCC): ICD-10-CM

## 2018-05-10 DIAGNOSIS — I47.29 NSVT (NONSUSTAINED VENTRICULAR TACHYCARDIA): ICD-10-CM

## 2018-05-10 DIAGNOSIS — R06.00 DYSPNEA, UNSPECIFIED TYPE: ICD-10-CM

## 2018-05-10 DIAGNOSIS — Z95.1 S/P CABG X 6: ICD-10-CM

## 2018-05-10 DIAGNOSIS — I47.20 SUSTAINED VT (VENTRICULAR TACHYCARDIA): ICD-10-CM

## 2018-05-10 PROCEDURE — 99213 OFFICE O/P EST LOW 20 MIN: CPT | Performed by: NURSE PRACTITIONER

## 2018-05-10 PROCEDURE — 93000 ELECTROCARDIOGRAM COMPLETE: CPT | Performed by: NURSE PRACTITIONER

## 2018-05-10 PROCEDURE — 99024 POSTOP FOLLOW-UP VISIT: CPT | Performed by: NURSE PRACTITIONER

## 2018-05-10 NOTE — PROGRESS NOTES
Genitourinary: Negative for dysuria, urgency and frequency. Musculoskeletal: Negative for myalgias, arthralgias and gait problem. Skin: Negative for color change, pallor, rash and wound. Neurological: Negative for dizziness, tremors, speech difficulty, weakness and numbness. Hematological: Does not bruise/bleed easily. Psychiatric/Behavioral: Negative. 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 Malissa Infante CAD (coronary artery disease)     a. History of open-heart surgery X6, 01/12/06, utilizing the left VANCE to the LAD, sequential vein graft to the intermeditate and obtuse marginal, individually reverse saphenous vein graft to the acute maginal of the PDA and PLOM. b. Stress echocardiogram 04/22/08, negative for myocardial ischemia.  Carotid artery stenosis     Chronic kidney disease     Chronic kidney disease (CKD), stage III (moderate)     COPD (chronic obstructive pulmonary disease) (HCC)     Depression     Gastric ulcer 11/06/2004    multiple, duodenal ulcer - Dr. Magnolia Martinez History of blood transfusion     Hypercholesterolemia     Hypertension     Thyroid disease     Ulcer 09/08/2010    Rectum.  Ventricular tachyarrhythmia [facility])        Past Surgical History:   Procedure Laterality Date    BACK SURGERY  08/17/2017    CARDIAC CATHETERIZATION  4/22/15  JDT    EF 50%    CARDIAC DEFIBRILLATOR PLACEMENT  04/24/2015    AICD    CARDIAC SURGERY  1/06    Open Heart Surgery (x6)    CATARACT REMOVAL  05    COLON SURGERY  6/26/04    Colon reversal (10/18/2004); Colon re-reversal (10/26/2004).  HERNIA REPAIR  10/26/2006    Ileostomy reversal and hernia repair.     KNEE SURGERY  1996    LAMINEC/FACETECT/FORAMIN,CERVICAL 1 SEG N/A 12/20/2017    L3-4 decompressive laminectomy using minimally invasive technique performed by Michelet Pearl DO at [address] [facility] 100 MG capsule, Take 1 capsule by mouth 2 times daily as needed for Constipation, Disp: 30 capsule, Rfl: 1    ondansetron (ZOFRAN) 4 MG tablet, Take 1 tablet by mouth every 8 hours as needed for Nausea or Vomiting, Disp: 30 tablet, Rfl: 1    vitamin D (ERGOCALCIFEROL) 45913 units CAPS capsule, Take 1 capsule by mouth once a week, Disp: 30 capsule, Rfl: 3    Misc. Devices [facility]) MISC, 1 each by Does not apply route daily, Disp: 1 each, Rfl: 0    dexamethasone (DECADRON) 6 MG tablet, Take 6 mg by mouth 2 times daily (with meals), Disp: , Rfl:     meloxicam (MOBIC) 7.5 MG tablet, Take 7.5 mg by mouth daily, Disp: , Rfl:     DULoxetine (CYMBALTA) 60 MG extended release capsule, Take 1 capsule by mouth daily, Disp: 90 capsule, Rfl: 1    Pediatric Multivitamins-Fl (MULTI VIT/FL PO), Take by mouth, Disp: , Rfl:     PE:  Vitals:    05/10/18 1312   BP: 118/62   Pulse: 80       Estimated body mass index is 24.55 kg/m² as calculated from the following:    Height as of this encounter: 6' (1.829 m). Weight as of this encounter: 181 lb (82.1 kg). Constitutional: He is oriented to person, place, and time. He appears well-developed and well-nourished in no acute distress. Head: Normocephalic and atraumatic. Neck:  Neck supple without JVD present. Cardiovascular: Normal rate, regular rhythm, normal heart sounds. no murmur ascultated. No gallop and no friction rub.  no carotid bruits. 1+ peripheral edema. Pulmonary/Chest:  Lungs clear to auscultation bilaterally without evidence of respiratory distress. He without wheezes. He without rales or ronchi. Musculoskeletal: Normal range of motion. Gait is normal with cane at times. Neurological: He is alert and oriented to person, place, and time. Skin: Skin is warm and dry without rash or pallor. Surgical incision is not examined of the right groin  Psychiatric: He has a normal mood and affect.  His behavior is normal. Thought content normal.     Lab Results   Component Value Date    CREATININE 1.5 06/01/2018    CREATININE 1.6 05/31/2018    CREATININE 1.6 05/30/2018    HGB 7.9 06/01/2018    HGB 8.3 05/31/2018    HGB 8.2 05/30/2018       ECG 09/12/18  Normal sinus rhythm, Right Bundle Branch Block and 79 BPM    TTE    Carelink remote ICD interrogation 4/17/2018  Presenting rhythm:VS,  <0.1%  Battery status: 8.6 years  Lead status: lead impedance within range and stable  Sensing:  R waves 13.6 mV  Thresholds: Ventricular 0.75 @ 0.4ms  Observations:  2 monitored NSVT-see scanned arrhythmia episode list  Next office visit:  5/7/18 with Silva Poster, APRN  [facility] scheduled for 7/17/18    Spoke to patient. [facility] denies any symptoms. Lexiscan 5/5/2017  Impression:  There is inferior ischemia, with a calculated ejection fraction of 65  %   Suggest:  cardiac catheterization  Dictated on 5/5/2017 12:30 PM EST. Signed by Dr Ted Michelle on  5/9/2017 3:03 PM EST  Signed by Dr Devin Adams 05/09/2017 14:03    Prior Cardiac History -       01/12/06 CABG x 6  LIMA-LAD, sequential VG-RI & OM, VG-acute maginal, PDA and PLOM [facility] Peeks, [facility])  4/22/2008  SE  negative for myocardial ischemia  1/9/2012  lexiscan negative for myocardial ischemia, EF 53%  04/22/2015  Symptomatic paroxymal VT (longest 17 beats at 202 bpm, on metroprolol) on  Holter  04/23/2015  Cath  Patent bypass conduits, normal LVFX, no AI, abdominal aortogram ok  04/24/2015  Single lead ICD  3/10/16  VT storm  5/5/2017  lexiscan Positive for inferior myocardial ischemia, EF 65%, intermediate risk findings, AUC indication 16, AUC score 7  5/15/17  Cath  Patent LIMA-LAD, patent VG-OM, patent VG-RCA, normal LVFX    Heart Cath 5/16/2017  Summary:       1. Successful femoral artery ultrasound  2. Successful femoral artery arteriogram  3. Severe native vessel coronary artery disease  4. Left ventricular function is normal  5. Patent left VANCE to the LAD  6.   Patent vein graft to the obtuse marginal  7. Patent vein graft to the distal RCA     RECOMMENDATIONS:   1.  Risk Factor Modification  2. On-going Medical Therapy  3. Will add IMDUR as an anti anginal medication.     Electronically signed by Juan Monreal MD on 5/15/17       Assessment    1. Coronary artery disease involving native coronary artery of native heart without angina pectoris    2. Dyspnea, unspecified type    3. S/P CABG x 6    4. Chronic kidney disease (CKD), stage III (moderate)    5. AICD (automatic cardioverter/defibrillator) present    6. NSVT (nonsustained ventricular tachycardia) (HCC)    7. Sustained VT (ventricular tachycardia) (HCC)          Plan:    CAD/CABG-statin, no change  HTN-controlled, no change  SOB-Obtain Echo, Last heart cath in 5/2017 normal LV function. Has multiple co -morbidities with COPD on oxygen, anemia      Disposition - RTC in 6 months with Dr. Yessica Monge or sooner if needed    Please do not hesitate to contact me for any questions or concerns.     Sincerely yours,    KANCHAN Concepcion

## 2018-05-21 ENCOUNTER — TELEPHONE (OUTPATIENT)
Dept: INTERNAL MEDICINE | Age: 78
End: 2018-05-21

## 2018-05-22 ENCOUNTER — HOSPITAL ENCOUNTER (INPATIENT)
Age: 78
LOS: 10 days | Discharge: HOME HEALTH | DRG: 857 | End: 2018-06-01
Attending: SURGERY | Admitting: SURGERY
Payer: MEDICARE

## 2018-05-22 ENCOUNTER — OFFICE VISIT (OUTPATIENT)
Dept: VASCULAR SURGERY | Age: 78
End: 2018-05-22

## 2018-05-22 VITALS
OXYGEN SATURATION: 98 % | RESPIRATION RATE: 18 BRPM | SYSTOLIC BLOOD PRESSURE: 140 MMHG | DIASTOLIC BLOOD PRESSURE: 58 MMHG | HEART RATE: 63 BPM | TEMPERATURE: 98.2 F

## 2018-05-22 DIAGNOSIS — T81.30XA WOUND DEHISCENCE: Primary | ICD-10-CM

## 2018-05-22 DIAGNOSIS — L02.214 GROIN ABSCESS: Primary | ICD-10-CM

## 2018-05-22 DIAGNOSIS — I70.213 ATHEROSCLEROSIS OF NATIVE ARTERY OF BOTH LOWER EXTREMITIES WITH INTERMITTENT CLAUDICATION (HCC): ICD-10-CM

## 2018-05-22 DIAGNOSIS — L08.9 WOUND INFECTION: Primary | ICD-10-CM

## 2018-05-22 DIAGNOSIS — T14.8XXA WOUND INFECTION: Primary | ICD-10-CM

## 2018-05-22 PROBLEM — T81.49XA POSTPROCEDURAL WOUND INFECTION: Status: ACTIVE | Noted: 2018-05-22

## 2018-05-22 LAB
ALBUMIN SERPL-MCNC: 2.9 G/DL (ref 3.5–5.2)
ALP BLD-CCNC: 92 U/L (ref 40–130)
ALT SERPL-CCNC: 100 U/L (ref 5–41)
ANION GAP SERPL CALCULATED.3IONS-SCNC: 11 MMOL/L (ref 7–19)
AST SERPL-CCNC: 89 U/L (ref 5–40)
BILIRUB SERPL-MCNC: 0.3 MG/DL (ref 0.2–1.2)
BUN BLDV-MCNC: 23 MG/DL (ref 8–23)
CALCIUM SERPL-MCNC: 8.8 MG/DL (ref 8.8–10.2)
CHLORIDE BLD-SCNC: 96 MMOL/L (ref 98–111)
CO2: 29 MMOL/L (ref 22–29)
CREAT SERPL-MCNC: 1.5 MG/DL (ref 0.5–1.2)
GFR NON-AFRICAN AMERICAN: 45
GLUCOSE BLD-MCNC: 131 MG/DL (ref 74–109)
HCT VFR BLD CALC: 28.6 % (ref 42–52)
HEMOGLOBIN: 8.7 G/DL (ref 14–18)
MCH RBC QN AUTO: 25.2 PG (ref 27–31)
MCHC RBC AUTO-ENTMCNC: 30.4 G/DL (ref 33–37)
MCV RBC AUTO: 82.9 FL (ref 80–94)
PDW BLD-RTO: 14.6 % (ref 11.5–14.5)
PLATELET # BLD: 310 K/UL (ref 130–400)
PMV BLD AUTO: 9.2 FL (ref 9.4–12.4)
POTASSIUM SERPL-SCNC: 3.7 MMOL/L (ref 3.5–5)
RBC # BLD: 3.45 M/UL (ref 4.7–6.1)
SODIUM BLD-SCNC: 136 MMOL/L (ref 136–145)
TOTAL PROTEIN: 6.8 G/DL (ref 6.6–8.7)
WBC # BLD: 9.8 K/UL (ref 4.8–10.8)

## 2018-05-22 PROCEDURE — 6370000000 HC RX 637 (ALT 250 FOR IP): Performed by: HOSPITALIST

## 2018-05-22 PROCEDURE — 94664 DEMO&/EVAL PT USE INHALER: CPT

## 2018-05-22 PROCEDURE — 2700000000 HC OXYGEN THERAPY PER DAY

## 2018-05-22 PROCEDURE — 80053 COMPREHEN METABOLIC PANEL: CPT

## 2018-05-22 PROCEDURE — 94640 AIRWAY INHALATION TREATMENT: CPT

## 2018-05-22 PROCEDURE — 1210000000 HC MED SURG R&B

## 2018-05-22 PROCEDURE — 6370000000 HC RX 637 (ALT 250 FOR IP): Performed by: INTERNAL MEDICINE

## 2018-05-22 PROCEDURE — 6360000002 HC RX W HCPCS: Performed by: NURSE PRACTITIONER

## 2018-05-22 PROCEDURE — 85027 COMPLETE CBC AUTOMATED: CPT

## 2018-05-22 PROCEDURE — 6370000000 HC RX 637 (ALT 250 FOR IP): Performed by: NURSE PRACTITIONER

## 2018-05-22 PROCEDURE — 99223 1ST HOSP IP/OBS HIGH 75: CPT | Performed by: HOSPITALIST

## 2018-05-22 PROCEDURE — 99024 POSTOP FOLLOW-UP VISIT: CPT | Performed by: NURSE PRACTITIONER

## 2018-05-22 PROCEDURE — 36415 COLL VENOUS BLD VENIPUNCTURE: CPT

## 2018-05-22 PROCEDURE — 2580000003 HC RX 258: Performed by: NURSE PRACTITIONER

## 2018-05-22 PROCEDURE — 87040 BLOOD CULTURE FOR BACTERIA: CPT

## 2018-05-22 RX ORDER — BUMETANIDE 0.5 MG/1
1 TABLET ORAL 2 TIMES DAILY
Status: DISCONTINUED | OUTPATIENT
Start: 2018-05-22 | End: 2018-05-29

## 2018-05-22 RX ORDER — AMLODIPINE BESYLATE 10 MG/1
10 TABLET ORAL DAILY
Status: DISCONTINUED | OUTPATIENT
Start: 2018-05-22 | End: 2018-06-01 | Stop reason: HOSPADM

## 2018-05-22 RX ORDER — POTASSIUM CHLORIDE 20 MEQ/1
20 TABLET, EXTENDED RELEASE ORAL DAILY
Status: DISCONTINUED | OUTPATIENT
Start: 2018-05-22 | End: 2018-06-01 | Stop reason: HOSPADM

## 2018-05-22 RX ORDER — HYDRALAZINE HYDROCHLORIDE 20 MG/ML
10 INJECTION INTRAMUSCULAR; INTRAVENOUS EVERY 4 HOURS PRN
Status: DISCONTINUED | OUTPATIENT
Start: 2018-05-22 | End: 2018-06-01 | Stop reason: HOSPADM

## 2018-05-22 RX ORDER — DULOXETIN HYDROCHLORIDE 60 MG/1
60 CAPSULE, DELAYED RELEASE ORAL DAILY
Status: DISCONTINUED | OUTPATIENT
Start: 2018-05-22 | End: 2018-06-01 | Stop reason: HOSPADM

## 2018-05-22 RX ORDER — ALBUTEROL SULFATE 2.5 MG/3ML
2.5 SOLUTION RESPIRATORY (INHALATION) EVERY 6 HOURS PRN
Status: DISCONTINUED | OUTPATIENT
Start: 2018-05-22 | End: 2018-06-01 | Stop reason: HOSPADM

## 2018-05-22 RX ORDER — ACETAMINOPHEN 325 MG/1
650 TABLET ORAL EVERY 4 HOURS PRN
Status: DISCONTINUED | OUTPATIENT
Start: 2018-05-22 | End: 2018-06-01 | Stop reason: HOSPADM

## 2018-05-22 RX ORDER — AMIODARONE HYDROCHLORIDE 200 MG/1
200 TABLET ORAL DAILY
Status: DISCONTINUED | OUTPATIENT
Start: 2018-05-22 | End: 2018-06-01 | Stop reason: HOSPADM

## 2018-05-22 RX ORDER — SODIUM CHLORIDE 9 MG/ML
INJECTION, SOLUTION INTRAVENOUS CONTINUOUS
Status: DISCONTINUED | OUTPATIENT
Start: 2018-05-22 | End: 2018-05-22

## 2018-05-22 RX ORDER — PREDNISONE 20 MG/1
40 TABLET ORAL DAILY
Status: COMPLETED | OUTPATIENT
Start: 2018-05-22 | End: 2018-05-26

## 2018-05-22 RX ORDER — ONDANSETRON 2 MG/ML
4 INJECTION INTRAMUSCULAR; INTRAVENOUS EVERY 6 HOURS PRN
Status: DISCONTINUED | OUTPATIENT
Start: 2018-05-22 | End: 2018-05-25 | Stop reason: SDUPTHER

## 2018-05-22 RX ORDER — DOCUSATE SODIUM 100 MG/1
100 CAPSULE, LIQUID FILLED ORAL 2 TIMES DAILY PRN
Status: DISCONTINUED | OUTPATIENT
Start: 2018-05-22 | End: 2018-06-01 | Stop reason: HOSPADM

## 2018-05-22 RX ORDER — ISOSORBIDE MONONITRATE 60 MG/1
60 TABLET, EXTENDED RELEASE ORAL DAILY
Status: DISCONTINUED | OUTPATIENT
Start: 2018-05-22 | End: 2018-06-01 | Stop reason: HOSPADM

## 2018-05-22 RX ORDER — IPRATROPIUM BROMIDE AND ALBUTEROL SULFATE 2.5; .5 MG/3ML; MG/3ML
1 SOLUTION RESPIRATORY (INHALATION)
Status: DISCONTINUED | OUTPATIENT
Start: 2018-05-22 | End: 2018-06-01 | Stop reason: HOSPADM

## 2018-05-22 RX ORDER — IPRATROPIUM BROMIDE AND ALBUTEROL SULFATE 2.5; .5 MG/3ML; MG/3ML
1 SOLUTION RESPIRATORY (INHALATION) EVERY 4 HOURS
Status: DISCONTINUED | OUTPATIENT
Start: 2018-05-22 | End: 2018-05-22

## 2018-05-22 RX ORDER — ATORVASTATIN CALCIUM 40 MG/1
40 TABLET, FILM COATED ORAL DAILY
Status: DISCONTINUED | OUTPATIENT
Start: 2018-05-22 | End: 2018-06-01 | Stop reason: HOSPADM

## 2018-05-22 RX ORDER — ASPIRIN 81 MG/1
81 TABLET ORAL DAILY
Status: DISCONTINUED | OUTPATIENT
Start: 2018-05-22 | End: 2018-06-01 | Stop reason: HOSPADM

## 2018-05-22 RX ADMIN — ASPIRIN 81 MG: 81 TABLET, COATED ORAL at 20:17

## 2018-05-22 RX ADMIN — Medication 2 G: at 20:16

## 2018-05-22 RX ADMIN — IPRATROPIUM BROMIDE AND ALBUTEROL SULFATE 1 AMPULE: 2.5; .5 SOLUTION RESPIRATORY (INHALATION) at 21:04

## 2018-05-22 RX ADMIN — ATORVASTATIN CALCIUM 40 MG: 40 TABLET, FILM COATED ORAL at 20:17

## 2018-05-22 RX ADMIN — AMLODIPINE BESYLATE 10 MG: 10 TABLET ORAL at 21:56

## 2018-05-22 RX ADMIN — VANCOMYCIN HYDROCHLORIDE 1250 MG: 5 INJECTION, POWDER, LYOPHILIZED, FOR SOLUTION INTRAVENOUS at 20:29

## 2018-05-22 RX ADMIN — IPRATROPIUM BROMIDE AND ALBUTEROL SULFATE 1 AMPULE: 2.5; .5 SOLUTION RESPIRATORY (INHALATION) at 21:49

## 2018-05-22 RX ADMIN — SODIUM CHLORIDE: 9 INJECTION, SOLUTION INTRAVENOUS at 20:24

## 2018-05-22 RX ADMIN — BUMETANIDE 1 MG: 0.5 TABLET ORAL at 20:17

## 2018-05-22 RX ADMIN — PREDNISONE 40 MG: 20 TABLET ORAL at 22:44

## 2018-05-22 RX ADMIN — POTASSIUM CHLORIDE 20 MEQ: 20 TABLET, EXTENDED RELEASE ORAL at 20:18

## 2018-05-22 ASSESSMENT — PAIN SCALES - GENERAL: PAINLEVEL_OUTOF10: 0

## 2018-05-23 ENCOUNTER — ANESTHESIA EVENT (OUTPATIENT)
Dept: OPERATING ROOM | Age: 78
DRG: 857 | End: 2018-05-23
Payer: MEDICARE

## 2018-05-23 ENCOUNTER — ANESTHESIA (OUTPATIENT)
Dept: OPERATING ROOM | Age: 78
DRG: 857 | End: 2018-05-23
Payer: MEDICARE

## 2018-05-23 VITALS
OXYGEN SATURATION: 99 % | DIASTOLIC BLOOD PRESSURE: 51 MMHG | SYSTOLIC BLOOD PRESSURE: 107 MMHG | TEMPERATURE: 97.5 F | RESPIRATION RATE: 1 BRPM

## 2018-05-23 LAB
ABO/RH: NORMAL
ANION GAP SERPL CALCULATED.3IONS-SCNC: 15 MMOL/L (ref 7–19)
ANTIBODY SCREEN: NORMAL
APTT: 27.6 SEC (ref 26–36.2)
BASOPHILS ABSOLUTE: 0.1 K/UL (ref 0–0.2)
BASOPHILS RELATIVE PERCENT: 0.3 % (ref 0–1)
BUN BLDV-MCNC: 26 MG/DL (ref 8–23)
CALCIUM SERPL-MCNC: 8.4 MG/DL (ref 8.8–10.2)
CHLORIDE BLD-SCNC: 95 MMOL/L (ref 98–111)
CO2: 25 MMOL/L (ref 22–29)
CREAT SERPL-MCNC: 1.6 MG/DL (ref 0.5–1.2)
EOSINOPHILS ABSOLUTE: 0 K/UL (ref 0–0.6)
EOSINOPHILS RELATIVE PERCENT: 0.1 % (ref 0–5)
GFR NON-AFRICAN AMERICAN: 42
GLUCOSE BLD-MCNC: 179 MG/DL (ref 74–109)
HCT VFR BLD CALC: 28.9 % (ref 42–52)
HEMOGLOBIN: 8.7 G/DL (ref 14–18)
INR BLD: 1.2 (ref 0.88–1.18)
LYMPHOCYTES ABSOLUTE: 0.6 K/UL (ref 1.1–4.5)
LYMPHOCYTES RELATIVE PERCENT: 3.8 % (ref 20–40)
MCH RBC QN AUTO: 25.3 PG (ref 27–31)
MCHC RBC AUTO-ENTMCNC: 30.1 G/DL (ref 33–37)
MCV RBC AUTO: 84 FL (ref 80–94)
MONOCYTES ABSOLUTE: 0.7 K/UL (ref 0–0.9)
MONOCYTES RELATIVE PERCENT: 4.4 % (ref 0–10)
NEUTROPHILS ABSOLUTE: 12.9 K/UL (ref 1.5–7.5)
NEUTROPHILS RELATIVE PERCENT: 86.8 % (ref 50–65)
PDW BLD-RTO: 14.6 % (ref 11.5–14.5)
PLATELET # BLD: 283 K/UL (ref 130–400)
PMV BLD AUTO: 9.6 FL (ref 9.4–12.4)
POTASSIUM REFLEX MAGNESIUM: 3.8 MMOL/L (ref 3.5–5)
PROTHROMBIN TIME: 15.1 SEC (ref 12–14.6)
RBC # BLD: 3.44 M/UL (ref 4.7–6.1)
SODIUM BLD-SCNC: 135 MMOL/L (ref 136–145)
WBC # BLD: 14.9 K/UL (ref 4.8–10.8)

## 2018-05-23 PROCEDURE — 2700000000 HC OXYGEN THERAPY PER DAY

## 2018-05-23 PROCEDURE — 6360000002 HC RX W HCPCS: Performed by: SURGERY

## 2018-05-23 PROCEDURE — 94640 AIRWAY INHALATION TREATMENT: CPT

## 2018-05-23 PROCEDURE — 6370000000 HC RX 637 (ALT 250 FOR IP): Performed by: NURSE PRACTITIONER

## 2018-05-23 PROCEDURE — 86900 BLOOD TYPING SEROLOGIC ABO: CPT

## 2018-05-23 PROCEDURE — 36415 COLL VENOUS BLD VENIPUNCTURE: CPT

## 2018-05-23 PROCEDURE — 2580000003 HC RX 258: Performed by: NURSE PRACTITIONER

## 2018-05-23 PROCEDURE — 87077 CULTURE AEROBIC IDENTIFY: CPT

## 2018-05-23 PROCEDURE — 2580000003 HC RX 258: Performed by: SURGERY

## 2018-05-23 PROCEDURE — 6360000002 HC RX W HCPCS: Performed by: NURSE ANESTHETIST, CERTIFIED REGISTERED

## 2018-05-23 PROCEDURE — 87186 SC STD MICRODIL/AGAR DIL: CPT

## 2018-05-23 PROCEDURE — 86850 RBC ANTIBODY SCREEN: CPT

## 2018-05-23 PROCEDURE — 0KXQ0ZZ TRANSFER RIGHT UPPER LEG MUSCLE, OPEN APPROACH: ICD-10-PCS | Performed by: SURGERY

## 2018-05-23 PROCEDURE — 0T9B70Z DRAINAGE OF BLADDER WITH DRAINAGE DEVICE, VIA NATURAL OR ARTIFICIAL OPENING: ICD-10-PCS | Performed by: SURGERY

## 2018-05-23 PROCEDURE — 2720000001 HC MISC SURG SUPPLY STERILE $51-500: Performed by: SURGERY

## 2018-05-23 PROCEDURE — 86901 BLOOD TYPING SEROLOGIC RH(D): CPT

## 2018-05-23 PROCEDURE — 6370000000 HC RX 637 (ALT 250 FOR IP): Performed by: INTERNAL MEDICINE

## 2018-05-23 PROCEDURE — 7100000001 HC PACU RECOVERY - ADDTL 15 MIN: Performed by: SURGERY

## 2018-05-23 PROCEDURE — 85610 PROTHROMBIN TIME: CPT

## 2018-05-23 PROCEDURE — 87070 CULTURE OTHR SPECIMN AEROBIC: CPT

## 2018-05-23 PROCEDURE — 3700000000 HC ANESTHESIA ATTENDED CARE: Performed by: SURGERY

## 2018-05-23 PROCEDURE — 2500000003 HC RX 250 WO HCPCS: Performed by: NURSE ANESTHETIST, CERTIFIED REGISTERED

## 2018-05-23 PROCEDURE — 86403 PARTICLE AGGLUT ANTBDY SCRN: CPT

## 2018-05-23 PROCEDURE — 0JBL0ZZ EXCISION OF RIGHT UPPER LEG SUBCUTANEOUS TISSUE AND FASCIA, OPEN APPROACH: ICD-10-PCS | Performed by: SURGERY

## 2018-05-23 PROCEDURE — 3600000015 HC SURGERY LEVEL 5 ADDTL 15MIN: Performed by: SURGERY

## 2018-05-23 PROCEDURE — 1210000000 HC MED SURG R&B

## 2018-05-23 PROCEDURE — 87176 TISSUE HOMOGENIZATION CULTR: CPT

## 2018-05-23 PROCEDURE — 6360000002 HC RX W HCPCS: Performed by: NURSE PRACTITIONER

## 2018-05-23 PROCEDURE — 85025 COMPLETE CBC W/AUTO DIFF WBC: CPT

## 2018-05-23 PROCEDURE — 3700000001 HC ADD 15 MINUTES (ANESTHESIA): Performed by: SURGERY

## 2018-05-23 PROCEDURE — 3600000005 HC SURGERY LEVEL 5 BASE: Performed by: SURGERY

## 2018-05-23 PROCEDURE — 6370000000 HC RX 637 (ALT 250 FOR IP): Performed by: HOSPITALIST

## 2018-05-23 PROCEDURE — 99233 SBSQ HOSP IP/OBS HIGH 50: CPT | Performed by: HOSPITALIST

## 2018-05-23 PROCEDURE — 7100000000 HC PACU RECOVERY - FIRST 15 MIN: Performed by: SURGERY

## 2018-05-23 PROCEDURE — 15738 MUSCLE-SKIN GRAFT LEG: CPT | Performed by: SURGERY

## 2018-05-23 PROCEDURE — 87205 SMEAR GRAM STAIN: CPT

## 2018-05-23 PROCEDURE — 85730 THROMBOPLASTIN TIME PARTIAL: CPT

## 2018-05-23 PROCEDURE — 2580000003 HC RX 258: Performed by: NURSE ANESTHETIST, CERTIFIED REGISTERED

## 2018-05-23 PROCEDURE — 80048 BASIC METABOLIC PNL TOTAL CA: CPT

## 2018-05-23 RX ORDER — SODIUM CHLORIDE, SODIUM LACTATE, POTASSIUM CHLORIDE, CALCIUM CHLORIDE 600; 310; 30; 20 MG/100ML; MG/100ML; MG/100ML; MG/100ML
INJECTION, SOLUTION INTRAVENOUS CONTINUOUS PRN
Status: DISCONTINUED | OUTPATIENT
Start: 2018-05-23 | End: 2018-05-23 | Stop reason: SDUPTHER

## 2018-05-23 RX ORDER — PROMETHAZINE HYDROCHLORIDE 25 MG/ML
6.25 INJECTION, SOLUTION INTRAMUSCULAR; INTRAVENOUS
Status: DISCONTINUED | OUTPATIENT
Start: 2018-05-23 | End: 2018-05-23 | Stop reason: HOSPADM

## 2018-05-23 RX ORDER — VALSARTAN 160 MG/1
160 TABLET ORAL DAILY
COMMUNITY
End: 2018-08-13 | Stop reason: ALTCHOICE

## 2018-05-23 RX ORDER — PROPOFOL 10 MG/ML
INJECTION, EMULSION INTRAVENOUS PRN
Status: DISCONTINUED | OUTPATIENT
Start: 2018-05-23 | End: 2018-05-23 | Stop reason: SDUPTHER

## 2018-05-23 RX ORDER — HYDRALAZINE HYDROCHLORIDE 20 MG/ML
5 INJECTION INTRAMUSCULAR; INTRAVENOUS EVERY 10 MIN PRN
Status: DISCONTINUED | OUTPATIENT
Start: 2018-05-23 | End: 2018-05-23 | Stop reason: HOSPADM

## 2018-05-23 RX ORDER — LABETALOL HYDROCHLORIDE 5 MG/ML
5 INJECTION, SOLUTION INTRAVENOUS EVERY 10 MIN PRN
Status: DISCONTINUED | OUTPATIENT
Start: 2018-05-23 | End: 2018-05-23 | Stop reason: HOSPADM

## 2018-05-23 RX ORDER — KETAMINE HYDROCHLORIDE 100 MG/ML
INJECTION, SOLUTION INTRAMUSCULAR; INTRAVENOUS PRN
Status: DISCONTINUED | OUTPATIENT
Start: 2018-05-23 | End: 2018-05-23 | Stop reason: SDUPTHER

## 2018-05-23 RX ORDER — METOCLOPRAMIDE HYDROCHLORIDE 5 MG/ML
10 INJECTION INTRAMUSCULAR; INTRAVENOUS
Status: DISCONTINUED | OUTPATIENT
Start: 2018-05-23 | End: 2018-05-23 | Stop reason: HOSPADM

## 2018-05-23 RX ORDER — FENTANYL CITRATE 50 UG/ML
INJECTION, SOLUTION INTRAMUSCULAR; INTRAVENOUS PRN
Status: DISCONTINUED | OUTPATIENT
Start: 2018-05-23 | End: 2018-05-23 | Stop reason: SDUPTHER

## 2018-05-23 RX ORDER — DIPHENHYDRAMINE HYDROCHLORIDE 50 MG/ML
12.5 INJECTION INTRAMUSCULAR; INTRAVENOUS
Status: DISCONTINUED | OUTPATIENT
Start: 2018-05-23 | End: 2018-05-23 | Stop reason: HOSPADM

## 2018-05-23 RX ORDER — SODIUM HYPOCHLORITE 1.25 MG/ML
SOLUTION TOPICAL DAILY
Status: DISCONTINUED | OUTPATIENT
Start: 2018-05-23 | End: 2018-05-24

## 2018-05-23 RX ORDER — MORPHINE SULFATE 4 MG/ML
4 INJECTION, SOLUTION INTRAMUSCULAR; INTRAVENOUS EVERY 5 MIN PRN
Status: DISCONTINUED | OUTPATIENT
Start: 2018-05-23 | End: 2018-05-23 | Stop reason: HOSPADM

## 2018-05-23 RX ORDER — HYDROMORPHONE HCL IN 0.9% NACL 0.5 MG/ML
0.5 SYRINGE (ML) INTRAVENOUS EVERY 5 MIN PRN
Status: DISCONTINUED | OUTPATIENT
Start: 2018-05-23 | End: 2018-05-23 | Stop reason: HOSPADM

## 2018-05-23 RX ORDER — ENALAPRILAT 2.5 MG/2ML
1.25 INJECTION INTRAVENOUS
Status: DISCONTINUED | OUTPATIENT
Start: 2018-05-23 | End: 2018-05-23 | Stop reason: HOSPADM

## 2018-05-23 RX ORDER — SODIUM CHLORIDE 0.9 % (FLUSH) 0.9 %
10 SYRINGE (ML) INJECTION EVERY 12 HOURS SCHEDULED
Status: DISCONTINUED | OUTPATIENT
Start: 2018-05-23 | End: 2018-06-01 | Stop reason: HOSPADM

## 2018-05-23 RX ORDER — SODIUM CHLORIDE 0.9 % (FLUSH) 0.9 %
10 SYRINGE (ML) INJECTION PRN
Status: DISCONTINUED | OUTPATIENT
Start: 2018-05-23 | End: 2018-06-01 | Stop reason: HOSPADM

## 2018-05-23 RX ORDER — ONDANSETRON 2 MG/ML
4 INJECTION INTRAMUSCULAR; INTRAVENOUS EVERY 6 HOURS PRN
Status: DISCONTINUED | OUTPATIENT
Start: 2018-05-23 | End: 2018-06-01 | Stop reason: HOSPADM

## 2018-05-23 RX ORDER — HYDROMORPHONE HCL IN 0.9% NACL 0.5 MG/ML
0.5 SYRINGE (ML) INTRAVENOUS
Status: DISCONTINUED | OUTPATIENT
Start: 2018-05-23 | End: 2018-06-01 | Stop reason: HOSPADM

## 2018-05-23 RX ORDER — ROCURONIUM BROMIDE 10 MG/ML
INJECTION, SOLUTION INTRAVENOUS PRN
Status: DISCONTINUED | OUTPATIENT
Start: 2018-05-23 | End: 2018-05-23 | Stop reason: SDUPTHER

## 2018-05-23 RX ORDER — DEXAMETHASONE SODIUM PHOSPHATE 10 MG/ML
INJECTION INTRAMUSCULAR; INTRAVENOUS PRN
Status: DISCONTINUED | OUTPATIENT
Start: 2018-05-23 | End: 2018-05-23 | Stop reason: SDUPTHER

## 2018-05-23 RX ORDER — LIDOCAINE HYDROCHLORIDE 10 MG/ML
INJECTION, SOLUTION INFILTRATION; PERINEURAL PRN
Status: DISCONTINUED | OUTPATIENT
Start: 2018-05-23 | End: 2018-05-23 | Stop reason: SDUPTHER

## 2018-05-23 RX ORDER — HYDROMORPHONE HCL IN 0.9% NACL 0.5 MG/ML
0.25 SYRINGE (ML) INTRAVENOUS EVERY 5 MIN PRN
Status: DISCONTINUED | OUTPATIENT
Start: 2018-05-23 | End: 2018-05-23 | Stop reason: HOSPADM

## 2018-05-23 RX ORDER — ONDANSETRON 2 MG/ML
INJECTION INTRAMUSCULAR; INTRAVENOUS PRN
Status: DISCONTINUED | OUTPATIENT
Start: 2018-05-23 | End: 2018-05-23 | Stop reason: SDUPTHER

## 2018-05-23 RX ORDER — MORPHINE SULFATE 4 MG/ML
2 INJECTION, SOLUTION INTRAMUSCULAR; INTRAVENOUS EVERY 5 MIN PRN
Status: DISCONTINUED | OUTPATIENT
Start: 2018-05-23 | End: 2018-05-23 | Stop reason: HOSPADM

## 2018-05-23 RX ORDER — HYDROMORPHONE HCL IN 0.9% NACL 0.5 MG/ML
0.25 SYRINGE (ML) INTRAVENOUS
Status: DISCONTINUED | OUTPATIENT
Start: 2018-05-23 | End: 2018-06-01 | Stop reason: HOSPADM

## 2018-05-23 RX ADMIN — DULOXETINE 60 MG: 60 CAPSULE, DELAYED RELEASE ORAL at 07:54

## 2018-05-23 RX ADMIN — PREDNISONE 40 MG: 20 TABLET ORAL at 07:54

## 2018-05-23 RX ADMIN — ATORVASTATIN CALCIUM 40 MG: 40 TABLET, FILM COATED ORAL at 07:54

## 2018-05-23 RX ADMIN — AMLODIPINE BESYLATE 10 MG: 10 TABLET ORAL at 07:54

## 2018-05-23 RX ADMIN — Medication 2 G: at 17:11

## 2018-05-23 RX ADMIN — FENTANYL CITRATE 25 MCG: 50 INJECTION, SOLUTION INTRAMUSCULAR; INTRAVENOUS at 14:53

## 2018-05-23 RX ADMIN — SODIUM CHLORIDE, SODIUM LACTATE, POTASSIUM CHLORIDE, AND CALCIUM CHLORIDE: 600; 310; 30; 20 INJECTION, SOLUTION INTRAVENOUS at 13:55

## 2018-05-23 RX ADMIN — FENTANYL CITRATE 50 MCG: 50 INJECTION, SOLUTION INTRAMUSCULAR; INTRAVENOUS at 14:22

## 2018-05-23 RX ADMIN — Medication 2 G: at 04:01

## 2018-05-23 RX ADMIN — PHENYLEPHRINE HYDROCHLORIDE 80 MCG: 10 INJECTION INTRAVENOUS at 14:56

## 2018-05-23 RX ADMIN — FENTANYL CITRATE 25 MCG: 50 INJECTION, SOLUTION INTRAMUSCULAR; INTRAVENOUS at 14:56

## 2018-05-23 RX ADMIN — PROPOFOL 120 MG: 10 INJECTION, EMULSION INTRAVENOUS at 14:02

## 2018-05-23 RX ADMIN — SUGAMMADEX 170 MG: 100 INJECTION, SOLUTION INTRAVENOUS at 14:43

## 2018-05-23 RX ADMIN — ONDANSETRON HYDROCHLORIDE 4 MG: 2 SOLUTION INTRAMUSCULAR; INTRAVENOUS at 14:59

## 2018-05-23 RX ADMIN — FENTANYL CITRATE 50 MCG: 50 INJECTION, SOLUTION INTRAMUSCULAR; INTRAVENOUS at 14:59

## 2018-05-23 RX ADMIN — Medication 10 MG: at 14:58

## 2018-05-23 RX ADMIN — FENTANYL CITRATE 50 MCG: 50 INJECTION, SOLUTION INTRAMUSCULAR; INTRAVENOUS at 14:02

## 2018-05-23 RX ADMIN — IPRATROPIUM BROMIDE AND ALBUTEROL SULFATE 1 AMPULE: 2.5; .5 SOLUTION RESPIRATORY (INHALATION) at 20:28

## 2018-05-23 RX ADMIN — BUMETANIDE 1 MG: 0.5 TABLET ORAL at 07:53

## 2018-05-23 RX ADMIN — VANCOMYCIN HYDROCHLORIDE 1250 MG: 5 INJECTION, POWDER, LYOPHILIZED, FOR SOLUTION INTRAVENOUS at 19:40

## 2018-05-23 RX ADMIN — ROCURONIUM BROMIDE 10 MG: 10 INJECTION INTRAVENOUS at 14:17

## 2018-05-23 RX ADMIN — FENTANYL CITRATE 50 MCG: 50 INJECTION, SOLUTION INTRAMUSCULAR; INTRAVENOUS at 14:45

## 2018-05-23 RX ADMIN — LIDOCAINE HYDROCHLORIDE 50 MG: 10 INJECTION, SOLUTION INFILTRATION; PERINEURAL at 14:02

## 2018-05-23 RX ADMIN — Medication 0.5 MG: at 17:11

## 2018-05-23 RX ADMIN — PHENYLEPHRINE HYDROCHLORIDE 80 MCG: 10 INJECTION INTRAVENOUS at 14:20

## 2018-05-23 RX ADMIN — Medication 0.5 MG: at 20:55

## 2018-05-23 RX ADMIN — POTASSIUM CHLORIDE 20 MEQ: 20 TABLET, EXTENDED RELEASE ORAL at 07:54

## 2018-05-23 RX ADMIN — PHENYLEPHRINE HYDROCHLORIDE 80 MCG: 10 INJECTION INTRAVENOUS at 15:02

## 2018-05-23 RX ADMIN — AMIODARONE HYDROCHLORIDE 200 MG: 200 TABLET ORAL at 07:54

## 2018-05-23 RX ADMIN — Medication 10 ML: at 19:40

## 2018-05-23 RX ADMIN — PHENYLEPHRINE HYDROCHLORIDE 80 MCG: 10 INJECTION INTRAVENOUS at 14:28

## 2018-05-23 RX ADMIN — ROCURONIUM BROMIDE 30 MG: 10 INJECTION INTRAVENOUS at 14:02

## 2018-05-23 RX ADMIN — ISOSORBIDE MONONITRATE 60 MG: 60 TABLET, EXTENDED RELEASE ORAL at 07:54

## 2018-05-23 RX ADMIN — IPRATROPIUM BROMIDE AND ALBUTEROL SULFATE 1 AMPULE: 2.5; .5 SOLUTION RESPIRATORY (INHALATION) at 07:02

## 2018-05-23 RX ADMIN — IPRATROPIUM BROMIDE AND ALBUTEROL SULFATE 1 AMPULE: 2.5; .5 SOLUTION RESPIRATORY (INHALATION) at 11:04

## 2018-05-23 RX ADMIN — PHENYLEPHRINE HYDROCHLORIDE 80 MCG: 10 INJECTION INTRAVENOUS at 14:35

## 2018-05-23 RX ADMIN — PHENYLEPHRINE HYDROCHLORIDE 80 MCG: 10 INJECTION INTRAVENOUS at 14:24

## 2018-05-23 RX ADMIN — DEXAMETHASONE SODIUM PHOSPHATE 10 MG: 10 INJECTION INTRAMUSCULAR; INTRAVENOUS at 14:09

## 2018-05-23 RX ADMIN — Medication 30 MG: at 14:02

## 2018-05-23 RX ADMIN — ASPIRIN 81 MG: 81 TABLET, COATED ORAL at 07:54

## 2018-05-23 ASSESSMENT — ENCOUNTER SYMPTOMS: SHORTNESS OF BREATH: 1

## 2018-05-23 ASSESSMENT — LIFESTYLE VARIABLES: SMOKING_STATUS: 0

## 2018-05-23 ASSESSMENT — PAIN SCALES - GENERAL
PAINLEVEL_OUTOF10: 8
PAINLEVEL_OUTOF10: 8

## 2018-05-23 ASSESSMENT — PAIN DESCRIPTION - DESCRIPTORS: DESCRIPTORS: BURNING

## 2018-05-23 ASSESSMENT — PAIN DESCRIPTION - PAIN TYPE: TYPE: ACUTE PAIN;SURGICAL PAIN

## 2018-05-23 ASSESSMENT — COPD QUESTIONNAIRES: CAT_SEVERITY: SEVERE

## 2018-05-23 ASSESSMENT — PAIN DESCRIPTION - ORIENTATION: ORIENTATION: RIGHT

## 2018-05-23 ASSESSMENT — PAIN DESCRIPTION - LOCATION: LOCATION: GROIN

## 2018-05-24 LAB
ANION GAP SERPL CALCULATED.3IONS-SCNC: 13 MMOL/L (ref 7–19)
ANION GAP SERPL CALCULATED.3IONS-SCNC: 15 MMOL/L (ref 7–19)
BASOPHILS ABSOLUTE: 0 K/UL (ref 0–0.2)
BASOPHILS RELATIVE PERCENT: 0.2 % (ref 0–1)
BUN BLDV-MCNC: 31 MG/DL (ref 8–23)
BUN BLDV-MCNC: 32 MG/DL (ref 8–23)
CALCIUM SERPL-MCNC: 8 MG/DL (ref 8.8–10.2)
CALCIUM SERPL-MCNC: 8.1 MG/DL (ref 8.8–10.2)
CHLORIDE BLD-SCNC: 99 MMOL/L (ref 98–111)
CHLORIDE BLD-SCNC: 99 MMOL/L (ref 98–111)
CO2: 24 MMOL/L (ref 22–29)
CO2: 24 MMOL/L (ref 22–29)
CREAT SERPL-MCNC: 1.4 MG/DL (ref 0.5–1.2)
CREAT SERPL-MCNC: 1.4 MG/DL (ref 0.5–1.2)
EOSINOPHILS ABSOLUTE: 0 K/UL (ref 0–0.6)
EOSINOPHILS RELATIVE PERCENT: 0 % (ref 0–5)
GFR NON-AFRICAN AMERICAN: 49
GFR NON-AFRICAN AMERICAN: 49
GLUCOSE BLD-MCNC: 210 MG/DL (ref 74–109)
GLUCOSE BLD-MCNC: 211 MG/DL (ref 74–109)
GRAM STAIN RESULT: ABNORMAL
HCT VFR BLD CALC: 25.4 % (ref 42–52)
HEMOGLOBIN: 7.9 G/DL (ref 14–18)
IRON SATURATION: 11 % (ref 14–50)
IRON: 24 UG/DL (ref 59–158)
LYMPHOCYTES ABSOLUTE: 0.7 K/UL (ref 1.1–4.5)
LYMPHOCYTES RELATIVE PERCENT: 5.6 % (ref 20–40)
MCH RBC QN AUTO: 25.6 PG (ref 27–31)
MCHC RBC AUTO-ENTMCNC: 31.1 G/DL (ref 33–37)
MCV RBC AUTO: 82.5 FL (ref 80–94)
MONOCYTES ABSOLUTE: 0.6 K/UL (ref 0–0.9)
MONOCYTES RELATIVE PERCENT: 5.2 % (ref 0–10)
NEUTROPHILS ABSOLUTE: 10.6 K/UL (ref 1.5–7.5)
NEUTROPHILS RELATIVE PERCENT: 86.8 % (ref 50–65)
ORGANISM: ABNORMAL
PDW BLD-RTO: 14.6 % (ref 11.5–14.5)
PLATELET # BLD: 296 K/UL (ref 130–400)
PMV BLD AUTO: 9.9 FL (ref 9.4–12.4)
POTASSIUM SERPL-SCNC: 3.9 MMOL/L (ref 3.5–5)
POTASSIUM SERPL-SCNC: 4.1 MMOL/L (ref 3.5–5)
RBC # BLD: 3.08 M/UL (ref 4.7–6.1)
SODIUM BLD-SCNC: 136 MMOL/L (ref 136–145)
SODIUM BLD-SCNC: 138 MMOL/L (ref 136–145)
TOTAL IRON BINDING CAPACITY: 221 UG/DL (ref 250–400)
WBC # BLD: 12.2 K/UL (ref 4.8–10.8)
WOUND/ABSCESS: ABNORMAL
WOUND/ABSCESS: ABNORMAL

## 2018-05-24 PROCEDURE — 6370000000 HC RX 637 (ALT 250 FOR IP): Performed by: NURSE PRACTITIONER

## 2018-05-24 PROCEDURE — 6370000000 HC RX 637 (ALT 250 FOR IP): Performed by: SURGERY

## 2018-05-24 PROCEDURE — 2580000003 HC RX 258: Performed by: NURSE PRACTITIONER

## 2018-05-24 PROCEDURE — 99233 SBSQ HOSP IP/OBS HIGH 50: CPT | Performed by: HOSPITALIST

## 2018-05-24 PROCEDURE — 6360000002 HC RX W HCPCS: Performed by: NURSE PRACTITIONER

## 2018-05-24 PROCEDURE — 85025 COMPLETE CBC W/AUTO DIFF WBC: CPT

## 2018-05-24 PROCEDURE — 6360000002 HC RX W HCPCS: Performed by: SURGERY

## 2018-05-24 PROCEDURE — 2580000003 HC RX 258: Performed by: HOSPITALIST

## 2018-05-24 PROCEDURE — 36415 COLL VENOUS BLD VENIPUNCTURE: CPT

## 2018-05-24 PROCEDURE — 6370000000 HC RX 637 (ALT 250 FOR IP): Performed by: INTERNAL MEDICINE

## 2018-05-24 PROCEDURE — 1210000000 HC MED SURG R&B

## 2018-05-24 PROCEDURE — 94640 AIRWAY INHALATION TREATMENT: CPT

## 2018-05-24 PROCEDURE — 80048 BASIC METABOLIC PNL TOTAL CA: CPT

## 2018-05-24 PROCEDURE — 6370000000 HC RX 637 (ALT 250 FOR IP): Performed by: HOSPITALIST

## 2018-05-24 PROCEDURE — 83540 ASSAY OF IRON: CPT

## 2018-05-24 PROCEDURE — 2700000000 HC OXYGEN THERAPY PER DAY

## 2018-05-24 PROCEDURE — 83550 IRON BINDING TEST: CPT

## 2018-05-24 PROCEDURE — 6360000002 HC RX W HCPCS: Performed by: HOSPITALIST

## 2018-05-24 RX ORDER — SODIUM HYPOCHLORITE 1.25 MG/ML
SOLUTION TOPICAL 2 TIMES DAILY
Status: DISCONTINUED | OUTPATIENT
Start: 2018-05-24 | End: 2018-06-01 | Stop reason: HOSPADM

## 2018-05-24 RX ADMIN — PREDNISONE 40 MG: 20 TABLET ORAL at 08:44

## 2018-05-24 RX ADMIN — VANCOMYCIN HYDROCHLORIDE 1250 MG: 5 INJECTION, POWDER, LYOPHILIZED, FOR SOLUTION INTRAVENOUS at 20:59

## 2018-05-24 RX ADMIN — Medication 2 G: at 05:12

## 2018-05-24 RX ADMIN — DAKIN'S SOLUTION 0.125% (QUARTER STRENGTH) 473 ML: 0.12 SOLUTION at 21:01

## 2018-05-24 RX ADMIN — IPRATROPIUM BROMIDE AND ALBUTEROL SULFATE 1 AMPULE: 2.5; .5 SOLUTION RESPIRATORY (INHALATION) at 06:41

## 2018-05-24 RX ADMIN — ACETAMINOPHEN 650 MG: 325 TABLET ORAL at 14:30

## 2018-05-24 RX ADMIN — BUMETANIDE 1 MG: 0.5 TABLET ORAL at 08:44

## 2018-05-24 RX ADMIN — ASPIRIN 81 MG: 81 TABLET, COATED ORAL at 08:44

## 2018-05-24 RX ADMIN — Medication 0.5 MG: at 18:44

## 2018-05-24 RX ADMIN — BUMETANIDE 1 MG: 0.5 TABLET ORAL at 20:59

## 2018-05-24 RX ADMIN — AMIODARONE HYDROCHLORIDE 200 MG: 200 TABLET ORAL at 08:44

## 2018-05-24 RX ADMIN — Medication 300 MG: at 18:36

## 2018-05-24 RX ADMIN — ATORVASTATIN CALCIUM 40 MG: 40 TABLET, FILM COATED ORAL at 21:01

## 2018-05-24 RX ADMIN — Medication 0.5 MG: at 02:15

## 2018-05-24 RX ADMIN — Medication 2 G: at 16:47

## 2018-05-24 RX ADMIN — IPRATROPIUM BROMIDE AND ALBUTEROL SULFATE 1 AMPULE: 2.5; .5 SOLUTION RESPIRATORY (INHALATION) at 10:38

## 2018-05-24 RX ADMIN — DULOXETINE 60 MG: 60 CAPSULE, DELAYED RELEASE ORAL at 08:44

## 2018-05-24 RX ADMIN — DAKIN'S SOLUTION 0.125% (QUARTER STRENGTH): 0.12 SOLUTION at 14:30

## 2018-05-24 RX ADMIN — POTASSIUM CHLORIDE 20 MEQ: 20 TABLET, EXTENDED RELEASE ORAL at 08:44

## 2018-05-24 RX ADMIN — AMLODIPINE BESYLATE 10 MG: 10 TABLET ORAL at 08:45

## 2018-05-24 RX ADMIN — IPRATROPIUM BROMIDE AND ALBUTEROL SULFATE 1 AMPULE: 2.5; .5 SOLUTION RESPIRATORY (INHALATION) at 15:16

## 2018-05-24 RX ADMIN — IPRATROPIUM BROMIDE AND ALBUTEROL SULFATE 1 AMPULE: 2.5; .5 SOLUTION RESPIRATORY (INHALATION) at 20:00

## 2018-05-24 RX ADMIN — ISOSORBIDE MONONITRATE 60 MG: 60 TABLET, EXTENDED RELEASE ORAL at 08:44

## 2018-05-24 ASSESSMENT — PAIN SCALES - GENERAL
PAINLEVEL_OUTOF10: 0
PAINLEVEL_OUTOF10: 1
PAINLEVEL_OUTOF10: 4
PAINLEVEL_OUTOF10: 5
PAINLEVEL_OUTOF10: 7

## 2018-05-25 LAB
ANION GAP SERPL CALCULATED.3IONS-SCNC: 13 MMOL/L (ref 7–19)
BASOPHILS ABSOLUTE: 0 K/UL (ref 0–0.2)
BASOPHILS RELATIVE PERCENT: 0.1 % (ref 0–1)
BUN BLDV-MCNC: 39 MG/DL (ref 8–23)
CALCIUM SERPL-MCNC: 7.8 MG/DL (ref 8.8–10.2)
CHLORIDE BLD-SCNC: 97 MMOL/L (ref 98–111)
CO2: 25 MMOL/L (ref 22–29)
CREAT SERPL-MCNC: 1.7 MG/DL (ref 0.5–1.2)
EOSINOPHILS ABSOLUTE: 0 K/UL (ref 0–0.6)
EOSINOPHILS RELATIVE PERCENT: 0 % (ref 0–5)
GFR NON-AFRICAN AMERICAN: 39
GLUCOSE BLD-MCNC: 157 MG/DL (ref 74–109)
HCT VFR BLD CALC: 25 % (ref 42–52)
HEMOGLOBIN: 7.8 G/DL (ref 14–18)
LYMPHOCYTES ABSOLUTE: 1.1 K/UL (ref 1.1–4.5)
LYMPHOCYTES RELATIVE PERCENT: 7.6 % (ref 20–40)
MCH RBC QN AUTO: 25.3 PG (ref 27–31)
MCHC RBC AUTO-ENTMCNC: 31.2 G/DL (ref 33–37)
MCV RBC AUTO: 81.2 FL (ref 80–94)
MONOCYTES ABSOLUTE: 1.2 K/UL (ref 0–0.9)
MONOCYTES RELATIVE PERCENT: 8.4 % (ref 0–10)
NEUTROPHILS ABSOLUTE: 12.1 K/UL (ref 1.5–7.5)
NEUTROPHILS RELATIVE PERCENT: 81.8 % (ref 50–65)
PDW BLD-RTO: 14.6 % (ref 11.5–14.5)
PLATELET # BLD: 293 K/UL (ref 130–400)
PMV BLD AUTO: 9.8 FL (ref 9.4–12.4)
POTASSIUM SERPL-SCNC: 3.7 MMOL/L (ref 3.5–5)
RBC # BLD: 3.08 M/UL (ref 4.7–6.1)
SODIUM BLD-SCNC: 135 MMOL/L (ref 136–145)
VANCOMYCIN TROUGH: 11.4 UG/ML (ref 10–20)
WBC # BLD: 14.8 K/UL (ref 4.8–10.8)

## 2018-05-25 PROCEDURE — 6360000002 HC RX W HCPCS: Performed by: SURGERY

## 2018-05-25 PROCEDURE — 80202 ASSAY OF VANCOMYCIN: CPT

## 2018-05-25 PROCEDURE — 2580000003 HC RX 258: Performed by: SURGERY

## 2018-05-25 PROCEDURE — 6370000000 HC RX 637 (ALT 250 FOR IP): Performed by: INTERNAL MEDICINE

## 2018-05-25 PROCEDURE — 6360000002 HC RX W HCPCS: Performed by: NURSE PRACTITIONER

## 2018-05-25 PROCEDURE — 1210000000 HC MED SURG R&B

## 2018-05-25 PROCEDURE — 2580000003 HC RX 258: Performed by: NURSE PRACTITIONER

## 2018-05-25 PROCEDURE — 80048 BASIC METABOLIC PNL TOTAL CA: CPT

## 2018-05-25 PROCEDURE — 36415 COLL VENOUS BLD VENIPUNCTURE: CPT

## 2018-05-25 PROCEDURE — 85025 COMPLETE CBC W/AUTO DIFF WBC: CPT

## 2018-05-25 PROCEDURE — 6370000000 HC RX 637 (ALT 250 FOR IP): Performed by: NURSE PRACTITIONER

## 2018-05-25 PROCEDURE — 94640 AIRWAY INHALATION TREATMENT: CPT

## 2018-05-25 PROCEDURE — 6370000000 HC RX 637 (ALT 250 FOR IP): Performed by: HOSPITALIST

## 2018-05-25 PROCEDURE — 99232 SBSQ HOSP IP/OBS MODERATE 35: CPT | Performed by: INTERNAL MEDICINE

## 2018-05-25 PROCEDURE — 2700000000 HC OXYGEN THERAPY PER DAY

## 2018-05-25 RX ADMIN — AMLODIPINE BESYLATE 10 MG: 10 TABLET ORAL at 09:09

## 2018-05-25 RX ADMIN — AMIODARONE HYDROCHLORIDE 200 MG: 200 TABLET ORAL at 09:10

## 2018-05-25 RX ADMIN — BUMETANIDE 1 MG: 0.5 TABLET ORAL at 09:10

## 2018-05-25 RX ADMIN — IPRATROPIUM BROMIDE AND ALBUTEROL SULFATE 1 AMPULE: 2.5; .5 SOLUTION RESPIRATORY (INHALATION) at 14:27

## 2018-05-25 RX ADMIN — DAKIN'S SOLUTION 0.125% (QUARTER STRENGTH): 0.12 SOLUTION at 12:00

## 2018-05-25 RX ADMIN — ATORVASTATIN CALCIUM 40 MG: 40 TABLET, FILM COATED ORAL at 21:25

## 2018-05-25 RX ADMIN — PREDNISONE 40 MG: 20 TABLET ORAL at 09:09

## 2018-05-25 RX ADMIN — Medication 2 G: at 03:38

## 2018-05-25 RX ADMIN — ASPIRIN 81 MG: 81 TABLET, COATED ORAL at 09:10

## 2018-05-25 RX ADMIN — BUMETANIDE 1 MG: 0.5 TABLET ORAL at 21:25

## 2018-05-25 RX ADMIN — IPRATROPIUM BROMIDE AND ALBUTEROL SULFATE 1 AMPULE: 2.5; .5 SOLUTION RESPIRATORY (INHALATION) at 19:53

## 2018-05-25 RX ADMIN — VANCOMYCIN HYDROCHLORIDE 1250 MG: 5 INJECTION, POWDER, LYOPHILIZED, FOR SOLUTION INTRAVENOUS at 22:28

## 2018-05-25 RX ADMIN — ISOSORBIDE MONONITRATE 60 MG: 60 TABLET, EXTENDED RELEASE ORAL at 09:10

## 2018-05-25 RX ADMIN — POTASSIUM CHLORIDE 20 MEQ: 20 TABLET, EXTENDED RELEASE ORAL at 09:10

## 2018-05-25 RX ADMIN — Medication 0.5 MG: at 03:35

## 2018-05-25 RX ADMIN — DULOXETINE 60 MG: 60 CAPSULE, DELAYED RELEASE ORAL at 09:10

## 2018-05-25 RX ADMIN — IPRATROPIUM BROMIDE AND ALBUTEROL SULFATE 1 AMPULE: 2.5; .5 SOLUTION RESPIRATORY (INHALATION) at 10:39

## 2018-05-25 RX ADMIN — Medication 0.5 MG: at 21:25

## 2018-05-25 RX ADMIN — IPRATROPIUM BROMIDE AND ALBUTEROL SULFATE 1 AMPULE: 2.5; .5 SOLUTION RESPIRATORY (INHALATION) at 06:55

## 2018-05-25 RX ADMIN — Medication 2 G: at 19:52

## 2018-05-25 ASSESSMENT — PAIN SCALES - GENERAL
PAINLEVEL_OUTOF10: 1
PAINLEVEL_OUTOF10: 6
PAINLEVEL_OUTOF10: 9

## 2018-05-26 LAB
ANION GAP SERPL CALCULATED.3IONS-SCNC: 14 MMOL/L (ref 7–19)
BASOPHILS ABSOLUTE: 0 K/UL (ref 0–0.2)
BASOPHILS RELATIVE PERCENT: 0.2 % (ref 0–1)
BUN BLDV-MCNC: 36 MG/DL (ref 8–23)
CALCIUM SERPL-MCNC: 8.2 MG/DL (ref 8.8–10.2)
CHLORIDE BLD-SCNC: 95 MMOL/L (ref 98–111)
CO2: 27 MMOL/L (ref 22–29)
CREAT SERPL-MCNC: 1.5 MG/DL (ref 0.5–1.2)
EOSINOPHILS ABSOLUTE: 0 K/UL (ref 0–0.6)
EOSINOPHILS RELATIVE PERCENT: 0 % (ref 0–5)
GFR NON-AFRICAN AMERICAN: 45
GLUCOSE BLD-MCNC: 124 MG/DL (ref 74–109)
HCT VFR BLD CALC: 26.8 % (ref 42–52)
HEMOGLOBIN: 8.2 G/DL (ref 14–18)
LYMPHOCYTES ABSOLUTE: 1.5 K/UL (ref 1.1–4.5)
LYMPHOCYTES RELATIVE PERCENT: 10.4 % (ref 20–40)
MCH RBC QN AUTO: 25.1 PG (ref 27–31)
MCHC RBC AUTO-ENTMCNC: 30.6 G/DL (ref 33–37)
MCV RBC AUTO: 82 FL (ref 80–94)
MONOCYTES ABSOLUTE: 1.2 K/UL (ref 0–0.9)
MONOCYTES RELATIVE PERCENT: 8.7 % (ref 0–10)
NEUTROPHILS ABSOLUTE: 10.4 K/UL (ref 1.5–7.5)
NEUTROPHILS RELATIVE PERCENT: 73.7 % (ref 50–65)
PDW BLD-RTO: 14.6 % (ref 11.5–14.5)
PLATELET # BLD: 318 K/UL (ref 130–400)
PMV BLD AUTO: 9.8 FL (ref 9.4–12.4)
POTASSIUM SERPL-SCNC: 3.7 MMOL/L (ref 3.5–5)
RBC # BLD: 3.27 M/UL (ref 4.7–6.1)
SODIUM BLD-SCNC: 136 MMOL/L (ref 136–145)
WBC # BLD: 14.1 K/UL (ref 4.8–10.8)

## 2018-05-26 PROCEDURE — 85025 COMPLETE CBC W/AUTO DIFF WBC: CPT

## 2018-05-26 PROCEDURE — 6360000002 HC RX W HCPCS: Performed by: SURGERY

## 2018-05-26 PROCEDURE — 6360000002 HC RX W HCPCS: Performed by: NURSE PRACTITIONER

## 2018-05-26 PROCEDURE — 2580000003 HC RX 258: Performed by: NURSE PRACTITIONER

## 2018-05-26 PROCEDURE — 6370000000 HC RX 637 (ALT 250 FOR IP): Performed by: NURSE PRACTITIONER

## 2018-05-26 PROCEDURE — 2700000000 HC OXYGEN THERAPY PER DAY

## 2018-05-26 PROCEDURE — 6370000000 HC RX 637 (ALT 250 FOR IP): Performed by: INTERNAL MEDICINE

## 2018-05-26 PROCEDURE — 1210000000 HC MED SURG R&B

## 2018-05-26 PROCEDURE — 2580000003 HC RX 258: Performed by: SURGERY

## 2018-05-26 PROCEDURE — 80048 BASIC METABOLIC PNL TOTAL CA: CPT

## 2018-05-26 PROCEDURE — 94640 AIRWAY INHALATION TREATMENT: CPT

## 2018-05-26 PROCEDURE — 36415 COLL VENOUS BLD VENIPUNCTURE: CPT

## 2018-05-26 PROCEDURE — 6370000000 HC RX 637 (ALT 250 FOR IP): Performed by: HOSPITALIST

## 2018-05-26 PROCEDURE — 99232 SBSQ HOSP IP/OBS MODERATE 35: CPT | Performed by: INTERNAL MEDICINE

## 2018-05-26 RX ADMIN — ATORVASTATIN CALCIUM 40 MG: 40 TABLET, FILM COATED ORAL at 19:40

## 2018-05-26 RX ADMIN — PREDNISONE 40 MG: 20 TABLET ORAL at 09:12

## 2018-05-26 RX ADMIN — IPRATROPIUM BROMIDE AND ALBUTEROL SULFATE 1 AMPULE: 2.5; .5 SOLUTION RESPIRATORY (INHALATION) at 11:09

## 2018-05-26 RX ADMIN — IPRATROPIUM BROMIDE AND ALBUTEROL SULFATE 1 AMPULE: 2.5; .5 SOLUTION RESPIRATORY (INHALATION) at 15:27

## 2018-05-26 RX ADMIN — DULOXETINE 60 MG: 60 CAPSULE, DELAYED RELEASE ORAL at 09:12

## 2018-05-26 RX ADMIN — VANCOMYCIN HYDROCHLORIDE 1250 MG: 5 INJECTION, POWDER, LYOPHILIZED, FOR SOLUTION INTRAVENOUS at 16:21

## 2018-05-26 RX ADMIN — AMLODIPINE BESYLATE 10 MG: 10 TABLET ORAL at 09:12

## 2018-05-26 RX ADMIN — AMIODARONE HYDROCHLORIDE 200 MG: 200 TABLET ORAL at 09:12

## 2018-05-26 RX ADMIN — BUMETANIDE 1 MG: 0.5 TABLET ORAL at 09:12

## 2018-05-26 RX ADMIN — IPRATROPIUM BROMIDE AND ALBUTEROL SULFATE 1 AMPULE: 2.5; .5 SOLUTION RESPIRATORY (INHALATION) at 18:09

## 2018-05-26 RX ADMIN — ASPIRIN 81 MG: 81 TABLET, COATED ORAL at 09:12

## 2018-05-26 RX ADMIN — POTASSIUM CHLORIDE 20 MEQ: 20 TABLET, EXTENDED RELEASE ORAL at 09:12

## 2018-05-26 RX ADMIN — Medication 2 G: at 05:07

## 2018-05-26 RX ADMIN — IPRATROPIUM BROMIDE AND ALBUTEROL SULFATE 1 AMPULE: 2.5; .5 SOLUTION RESPIRATORY (INHALATION) at 07:41

## 2018-05-26 RX ADMIN — ACETAMINOPHEN 650 MG: 325 TABLET ORAL at 23:40

## 2018-05-26 RX ADMIN — Medication 2 G: at 17:57

## 2018-05-26 RX ADMIN — ISOSORBIDE MONONITRATE 60 MG: 60 TABLET, EXTENDED RELEASE ORAL at 09:12

## 2018-05-26 RX ADMIN — BUMETANIDE 1 MG: 0.5 TABLET ORAL at 19:40

## 2018-05-26 ASSESSMENT — PAIN SCALES - GENERAL
PAINLEVEL_OUTOF10: 0
PAINLEVEL_OUTOF10: 8

## 2018-05-27 LAB
ANAEROBIC CULTURE: ABNORMAL
ANAEROBIC CULTURE: ABNORMAL
ANION GAP SERPL CALCULATED.3IONS-SCNC: 12 MMOL/L (ref 7–19)
BASOPHILS ABSOLUTE: 0 K/UL (ref 0–0.2)
BASOPHILS RELATIVE PERCENT: 0.3 % (ref 0–1)
BLOOD CULTURE, ROUTINE: NORMAL
BUN BLDV-MCNC: 36 MG/DL (ref 8–23)
CALCIUM SERPL-MCNC: 8.5 MG/DL (ref 8.8–10.2)
CHLORIDE BLD-SCNC: 94 MMOL/L (ref 98–111)
CO2: 28 MMOL/L (ref 22–29)
CREAT SERPL-MCNC: 1.8 MG/DL (ref 0.5–1.2)
CULTURE SURGICAL: ABNORMAL
CULTURE, BLOOD 2: NORMAL
EOSINOPHILS ABSOLUTE: 0 K/UL (ref 0–0.6)
EOSINOPHILS RELATIVE PERCENT: 0.1 % (ref 0–5)
GFR NON-AFRICAN AMERICAN: 37
GLUCOSE BLD-MCNC: 167 MG/DL (ref 74–109)
GRAM STAIN RESULT: ABNORMAL
GRAM STAIN RESULT: ABNORMAL
HCT VFR BLD CALC: 28 % (ref 42–52)
HEMOGLOBIN: 8.6 G/DL (ref 14–18)
LYMPHOCYTES ABSOLUTE: 1.4 K/UL (ref 1.1–4.5)
LYMPHOCYTES RELATIVE PERCENT: 9.7 % (ref 20–40)
MCH RBC QN AUTO: 25.3 PG (ref 27–31)
MCHC RBC AUTO-ENTMCNC: 30.7 G/DL (ref 33–37)
MCV RBC AUTO: 82.4 FL (ref 80–94)
MONOCYTES ABSOLUTE: 1.1 K/UL (ref 0–0.9)
MONOCYTES RELATIVE PERCENT: 7.7 % (ref 0–10)
NEUTROPHILS ABSOLUTE: 10.8 K/UL (ref 1.5–7.5)
NEUTROPHILS RELATIVE PERCENT: 75.6 % (ref 50–65)
ORGANISM: ABNORMAL
PDW BLD-RTO: 14.7 % (ref 11.5–14.5)
PLATELET # BLD: 324 K/UL (ref 130–400)
PMV BLD AUTO: 9.6 FL (ref 9.4–12.4)
POTASSIUM SERPL-SCNC: 3.9 MMOL/L (ref 3.5–5)
RBC # BLD: 3.4 M/UL (ref 4.7–6.1)
SODIUM BLD-SCNC: 134 MMOL/L (ref 136–145)
VANCOMYCIN TROUGH: 15.4 UG/ML (ref 10–20)
WBC # BLD: 14.3 K/UL (ref 4.8–10.8)

## 2018-05-27 PROCEDURE — 36415 COLL VENOUS BLD VENIPUNCTURE: CPT

## 2018-05-27 PROCEDURE — 1210000000 HC MED SURG R&B

## 2018-05-27 PROCEDURE — 6360000002 HC RX W HCPCS: Performed by: SURGERY

## 2018-05-27 PROCEDURE — 6370000000 HC RX 637 (ALT 250 FOR IP): Performed by: NURSE PRACTITIONER

## 2018-05-27 PROCEDURE — 80202 ASSAY OF VANCOMYCIN: CPT

## 2018-05-27 PROCEDURE — 6370000000 HC RX 637 (ALT 250 FOR IP): Performed by: INTERNAL MEDICINE

## 2018-05-27 PROCEDURE — 80048 BASIC METABOLIC PNL TOTAL CA: CPT

## 2018-05-27 PROCEDURE — 94640 AIRWAY INHALATION TREATMENT: CPT

## 2018-05-27 PROCEDURE — 6370000000 HC RX 637 (ALT 250 FOR IP): Performed by: HOSPITALIST

## 2018-05-27 PROCEDURE — 2580000003 HC RX 258: Performed by: NURSE PRACTITIONER

## 2018-05-27 PROCEDURE — 2580000003 HC RX 258: Performed by: SURGERY

## 2018-05-27 PROCEDURE — 85025 COMPLETE CBC W/AUTO DIFF WBC: CPT

## 2018-05-27 PROCEDURE — 2700000000 HC OXYGEN THERAPY PER DAY

## 2018-05-27 PROCEDURE — 99232 SBSQ HOSP IP/OBS MODERATE 35: CPT | Performed by: INTERNAL MEDICINE

## 2018-05-27 PROCEDURE — 6360000002 HC RX W HCPCS: Performed by: NURSE PRACTITIONER

## 2018-05-27 RX ADMIN — Medication 2 G: at 03:45

## 2018-05-27 RX ADMIN — BUMETANIDE 1 MG: 0.5 TABLET ORAL at 22:04

## 2018-05-27 RX ADMIN — IPRATROPIUM BROMIDE AND ALBUTEROL SULFATE 1 AMPULE: 2.5; .5 SOLUTION RESPIRATORY (INHALATION) at 18:24

## 2018-05-27 RX ADMIN — ASPIRIN 81 MG: 81 TABLET, COATED ORAL at 08:52

## 2018-05-27 RX ADMIN — ATORVASTATIN CALCIUM 40 MG: 40 TABLET, FILM COATED ORAL at 22:04

## 2018-05-27 RX ADMIN — AMIODARONE HYDROCHLORIDE 200 MG: 200 TABLET ORAL at 08:52

## 2018-05-27 RX ADMIN — Medication 10 ML: at 22:05

## 2018-05-27 RX ADMIN — Medication 10 ML: at 08:53

## 2018-05-27 RX ADMIN — Medication 2 G: at 22:04

## 2018-05-27 RX ADMIN — ISOSORBIDE MONONITRATE 60 MG: 60 TABLET, EXTENDED RELEASE ORAL at 08:52

## 2018-05-27 RX ADMIN — IPRATROPIUM BROMIDE AND ALBUTEROL SULFATE 1 AMPULE: 2.5; .5 SOLUTION RESPIRATORY (INHALATION) at 10:19

## 2018-05-27 RX ADMIN — BUMETANIDE 1 MG: 0.5 TABLET ORAL at 08:52

## 2018-05-27 RX ADMIN — ACETAMINOPHEN 650 MG: 325 TABLET ORAL at 22:04

## 2018-05-27 RX ADMIN — POTASSIUM CHLORIDE 20 MEQ: 20 TABLET, EXTENDED RELEASE ORAL at 08:52

## 2018-05-27 RX ADMIN — Medication 2 G: at 16:40

## 2018-05-27 RX ADMIN — AMLODIPINE BESYLATE 10 MG: 10 TABLET ORAL at 08:52

## 2018-05-27 RX ADMIN — IPRATROPIUM BROMIDE AND ALBUTEROL SULFATE 1 AMPULE: 2.5; .5 SOLUTION RESPIRATORY (INHALATION) at 07:12

## 2018-05-27 RX ADMIN — DULOXETINE 60 MG: 60 CAPSULE, DELAYED RELEASE ORAL at 08:52

## 2018-05-27 RX ADMIN — IPRATROPIUM BROMIDE AND ALBUTEROL SULFATE 1 AMPULE: 2.5; .5 SOLUTION RESPIRATORY (INHALATION) at 15:05

## 2018-05-27 RX ADMIN — VANCOMYCIN HYDROCHLORIDE 1250 MG: 5 INJECTION, POWDER, LYOPHILIZED, FOR SOLUTION INTRAVENOUS at 10:57

## 2018-05-27 ASSESSMENT — PAIN SCALES - GENERAL
PAINLEVEL_OUTOF10: 0
PAINLEVEL_OUTOF10: 1
PAINLEVEL_OUTOF10: 3
PAINLEVEL_OUTOF10: 0

## 2018-05-28 LAB
ANION GAP SERPL CALCULATED.3IONS-SCNC: 13 MMOL/L (ref 7–19)
BASOPHILS ABSOLUTE: 0 K/UL (ref 0–0.2)
BASOPHILS RELATIVE PERCENT: 0.3 % (ref 0–1)
BUN BLDV-MCNC: 35 MG/DL (ref 8–23)
CALCIUM SERPL-MCNC: 8.1 MG/DL (ref 8.8–10.2)
CHLORIDE BLD-SCNC: 95 MMOL/L (ref 98–111)
CO2: 29 MMOL/L (ref 22–29)
CREAT SERPL-MCNC: 1.6 MG/DL (ref 0.5–1.2)
EOSINOPHILS ABSOLUTE: 0.7 K/UL (ref 0–0.6)
EOSINOPHILS RELATIVE PERCENT: 4.5 % (ref 0–5)
GFR NON-AFRICAN AMERICAN: 42
GLUCOSE BLD-MCNC: 158 MG/DL (ref 74–109)
HCT VFR BLD CALC: 26.9 % (ref 42–52)
HEMOGLOBIN: 8.2 G/DL (ref 14–18)
LYMPHOCYTES ABSOLUTE: 2.3 K/UL (ref 1.1–4.5)
LYMPHOCYTES RELATIVE PERCENT: 16.2 % (ref 20–40)
MCH RBC QN AUTO: 25.2 PG (ref 27–31)
MCHC RBC AUTO-ENTMCNC: 30.5 G/DL (ref 33–37)
MCV RBC AUTO: 82.8 FL (ref 80–94)
MONOCYTES ABSOLUTE: 1.4 K/UL (ref 0–0.9)
MONOCYTES RELATIVE PERCENT: 9.8 % (ref 0–10)
NEUTROPHILS ABSOLUTE: 8.6 K/UL (ref 1.5–7.5)
NEUTROPHILS RELATIVE PERCENT: 59.7 % (ref 50–65)
PDW BLD-RTO: 14.8 % (ref 11.5–14.5)
PLATELET # BLD: 313 K/UL (ref 130–400)
PMV BLD AUTO: 9.8 FL (ref 9.4–12.4)
POTASSIUM SERPL-SCNC: 3.6 MMOL/L (ref 3.5–5)
RBC # BLD: 3.25 M/UL (ref 4.7–6.1)
SODIUM BLD-SCNC: 137 MMOL/L (ref 136–145)
WBC # BLD: 14.4 K/UL (ref 4.8–10.8)

## 2018-05-28 PROCEDURE — 80048 BASIC METABOLIC PNL TOTAL CA: CPT

## 2018-05-28 PROCEDURE — 85025 COMPLETE CBC W/AUTO DIFF WBC: CPT

## 2018-05-28 PROCEDURE — 6370000000 HC RX 637 (ALT 250 FOR IP): Performed by: HOSPITALIST

## 2018-05-28 PROCEDURE — 99024 POSTOP FOLLOW-UP VISIT: CPT | Performed by: SURGERY

## 2018-05-28 PROCEDURE — 6370000000 HC RX 637 (ALT 250 FOR IP): Performed by: NURSE PRACTITIONER

## 2018-05-28 PROCEDURE — 6360000002 HC RX W HCPCS: Performed by: NURSE PRACTITIONER

## 2018-05-28 PROCEDURE — 1210000000 HC MED SURG R&B

## 2018-05-28 PROCEDURE — 6360000002 HC RX W HCPCS: Performed by: SURGERY

## 2018-05-28 PROCEDURE — 2580000003 HC RX 258: Performed by: NURSE PRACTITIONER

## 2018-05-28 PROCEDURE — 36415 COLL VENOUS BLD VENIPUNCTURE: CPT

## 2018-05-28 PROCEDURE — 6370000000 HC RX 637 (ALT 250 FOR IP): Performed by: INTERNAL MEDICINE

## 2018-05-28 PROCEDURE — 2580000003 HC RX 258: Performed by: SURGERY

## 2018-05-28 PROCEDURE — 94640 AIRWAY INHALATION TREATMENT: CPT

## 2018-05-28 PROCEDURE — 2700000000 HC OXYGEN THERAPY PER DAY

## 2018-05-28 RX ADMIN — ACETAMINOPHEN 650 MG: 325 TABLET ORAL at 08:04

## 2018-05-28 RX ADMIN — Medication 0.5 MG: at 10:16

## 2018-05-28 RX ADMIN — DULOXETINE 60 MG: 60 CAPSULE, DELAYED RELEASE ORAL at 08:04

## 2018-05-28 RX ADMIN — VANCOMYCIN HYDROCHLORIDE 1250 MG: 5 INJECTION, POWDER, LYOPHILIZED, FOR SOLUTION INTRAVENOUS at 03:55

## 2018-05-28 RX ADMIN — DOCUSATE SODIUM 100 MG: 100 CAPSULE, LIQUID FILLED ORAL at 20:02

## 2018-05-28 RX ADMIN — Medication 0.5 MG: at 21:34

## 2018-05-28 RX ADMIN — IPRATROPIUM BROMIDE AND ALBUTEROL SULFATE 1 AMPULE: 2.5; .5 SOLUTION RESPIRATORY (INHALATION) at 06:36

## 2018-05-28 RX ADMIN — BUMETANIDE 1 MG: 0.5 TABLET ORAL at 20:02

## 2018-05-28 RX ADMIN — Medication 0.5 MG: at 15:20

## 2018-05-28 RX ADMIN — ATORVASTATIN CALCIUM 40 MG: 40 TABLET, FILM COATED ORAL at 20:02

## 2018-05-28 RX ADMIN — ASPIRIN 81 MG: 81 TABLET, COATED ORAL at 08:05

## 2018-05-28 RX ADMIN — IPRATROPIUM BROMIDE AND ALBUTEROL SULFATE 1 AMPULE: 2.5; .5 SOLUTION RESPIRATORY (INHALATION) at 10:37

## 2018-05-28 RX ADMIN — IPRATROPIUM BROMIDE AND ALBUTEROL SULFATE 1 AMPULE: 2.5; .5 SOLUTION RESPIRATORY (INHALATION) at 14:25

## 2018-05-28 RX ADMIN — AMLODIPINE BESYLATE 10 MG: 10 TABLET ORAL at 08:05

## 2018-05-28 RX ADMIN — BUMETANIDE 1 MG: 0.5 TABLET ORAL at 08:04

## 2018-05-28 RX ADMIN — Medication 2 G: at 03:55

## 2018-05-28 RX ADMIN — Medication 10 ML: at 20:02

## 2018-05-28 RX ADMIN — IPRATROPIUM BROMIDE AND ALBUTEROL SULFATE 1 AMPULE: 2.5; .5 SOLUTION RESPIRATORY (INHALATION) at 18:50

## 2018-05-28 RX ADMIN — ACETAMINOPHEN 650 MG: 325 TABLET ORAL at 20:02

## 2018-05-28 RX ADMIN — ISOSORBIDE MONONITRATE 60 MG: 60 TABLET, EXTENDED RELEASE ORAL at 08:04

## 2018-05-28 RX ADMIN — POTASSIUM CHLORIDE 20 MEQ: 20 TABLET, EXTENDED RELEASE ORAL at 08:05

## 2018-05-28 RX ADMIN — Medication 10 ML: at 08:04

## 2018-05-28 RX ADMIN — VANCOMYCIN HYDROCHLORIDE 1250 MG: 5 INJECTION, POWDER, LYOPHILIZED, FOR SOLUTION INTRAVENOUS at 21:56

## 2018-05-28 RX ADMIN — Medication 10 ML: at 21:34

## 2018-05-28 RX ADMIN — AMIODARONE HYDROCHLORIDE 200 MG: 200 TABLET ORAL at 08:05

## 2018-05-28 RX ADMIN — Medication 2 G: at 15:20

## 2018-05-28 ASSESSMENT — PAIN SCALES - GENERAL
PAINLEVEL_OUTOF10: 4
PAINLEVEL_OUTOF10: 0
PAINLEVEL_OUTOF10: 7
PAINLEVEL_OUTOF10: 5
PAINLEVEL_OUTOF10: 7
PAINLEVEL_OUTOF10: 0
PAINLEVEL_OUTOF10: 7
PAINLEVEL_OUTOF10: 0
PAINLEVEL_OUTOF10: 5
PAINLEVEL_OUTOF10: 0
PAINLEVEL_OUTOF10: 8

## 2018-05-29 LAB
ANION GAP SERPL CALCULATED.3IONS-SCNC: 10 MMOL/L (ref 7–19)
BASOPHILS ABSOLUTE: 0.1 K/UL (ref 0–0.2)
BASOPHILS RELATIVE PERCENT: 0.3 % (ref 0–1)
BUN BLDV-MCNC: 35 MG/DL (ref 8–23)
CALCIUM SERPL-MCNC: 8.2 MG/DL (ref 8.8–10.2)
CHLORIDE BLD-SCNC: 96 MMOL/L (ref 98–111)
CO2: 31 MMOL/L (ref 22–29)
CREAT SERPL-MCNC: 1.4 MG/DL (ref 0.5–1.2)
EOSINOPHILS ABSOLUTE: 0.7 K/UL (ref 0–0.6)
EOSINOPHILS RELATIVE PERCENT: 4.6 % (ref 0–5)
GFR NON-AFRICAN AMERICAN: 49
GLUCOSE BLD-MCNC: 118 MG/DL (ref 74–109)
HCT VFR BLD CALC: 28.5 % (ref 42–52)
HEMOGLOBIN: 8.6 G/DL (ref 14–18)
LYMPHOCYTES ABSOLUTE: 1.8 K/UL (ref 1.1–4.5)
LYMPHOCYTES RELATIVE PERCENT: 12.8 % (ref 20–40)
MCH RBC QN AUTO: 25.1 PG (ref 27–31)
MCHC RBC AUTO-ENTMCNC: 30.2 G/DL (ref 33–37)
MCV RBC AUTO: 83.1 FL (ref 80–94)
MONOCYTES ABSOLUTE: 1.4 K/UL (ref 0–0.9)
MONOCYTES RELATIVE PERCENT: 9.6 % (ref 0–10)
NEUTROPHILS ABSOLUTE: 9 K/UL (ref 1.5–7.5)
NEUTROPHILS RELATIVE PERCENT: 62.7 % (ref 50–65)
PDW BLD-RTO: 15.4 % (ref 11.5–14.5)
PLATELET # BLD: 289 K/UL (ref 130–400)
PMV BLD AUTO: 10 FL (ref 9.4–12.4)
POTASSIUM SERPL-SCNC: 3.5 MMOL/L (ref 3.5–5)
RBC # BLD: 3.43 M/UL (ref 4.7–6.1)
SODIUM BLD-SCNC: 137 MMOL/L (ref 136–145)
WBC # BLD: 14.4 K/UL (ref 4.8–10.8)

## 2018-05-29 PROCEDURE — 1210000000 HC MED SURG R&B

## 2018-05-29 PROCEDURE — 2580000003 HC RX 258: Performed by: SURGERY

## 2018-05-29 PROCEDURE — 6370000000 HC RX 637 (ALT 250 FOR IP): Performed by: INTERNAL MEDICINE

## 2018-05-29 PROCEDURE — 6360000002 HC RX W HCPCS: Performed by: NURSE PRACTITIONER

## 2018-05-29 PROCEDURE — 2700000000 HC OXYGEN THERAPY PER DAY

## 2018-05-29 PROCEDURE — 36415 COLL VENOUS BLD VENIPUNCTURE: CPT

## 2018-05-29 PROCEDURE — 6360000002 HC RX W HCPCS: Performed by: SURGERY

## 2018-05-29 PROCEDURE — 6370000000 HC RX 637 (ALT 250 FOR IP): Performed by: HOSPITALIST

## 2018-05-29 PROCEDURE — 85025 COMPLETE CBC W/AUTO DIFF WBC: CPT

## 2018-05-29 PROCEDURE — 6370000000 HC RX 637 (ALT 250 FOR IP): Performed by: NURSE PRACTITIONER

## 2018-05-29 PROCEDURE — 99024 POSTOP FOLLOW-UP VISIT: CPT | Performed by: SURGERY

## 2018-05-29 PROCEDURE — 2580000003 HC RX 258: Performed by: NURSE PRACTITIONER

## 2018-05-29 PROCEDURE — 80048 BASIC METABOLIC PNL TOTAL CA: CPT

## 2018-05-29 PROCEDURE — 94640 AIRWAY INHALATION TREATMENT: CPT

## 2018-05-29 RX ORDER — BUMETANIDE 0.5 MG/1
1 TABLET ORAL 2 TIMES DAILY
Status: DISCONTINUED | OUTPATIENT
Start: 2018-05-29 | End: 2018-06-01 | Stop reason: HOSPADM

## 2018-05-29 RX ADMIN — Medication 0.5 MG: at 22:34

## 2018-05-29 RX ADMIN — Medication 2 G: at 22:32

## 2018-05-29 RX ADMIN — VANCOMYCIN HYDROCHLORIDE 1250 MG: 5 INJECTION, POWDER, LYOPHILIZED, FOR SOLUTION INTRAVENOUS at 16:12

## 2018-05-29 RX ADMIN — IPRATROPIUM BROMIDE AND ALBUTEROL SULFATE 1 AMPULE: 2.5; .5 SOLUTION RESPIRATORY (INHALATION) at 07:10

## 2018-05-29 RX ADMIN — BUMETANIDE 1 MG: 0.5 TABLET ORAL at 17:01

## 2018-05-29 RX ADMIN — IPRATROPIUM BROMIDE AND ALBUTEROL SULFATE 1 AMPULE: 2.5; .5 SOLUTION RESPIRATORY (INHALATION) at 19:32

## 2018-05-29 RX ADMIN — ATORVASTATIN CALCIUM 40 MG: 40 TABLET, FILM COATED ORAL at 22:32

## 2018-05-29 RX ADMIN — AMIODARONE HYDROCHLORIDE 200 MG: 200 TABLET ORAL at 08:41

## 2018-05-29 RX ADMIN — Medication 10 ML: at 08:41

## 2018-05-29 RX ADMIN — Medication 2 G: at 04:51

## 2018-05-29 RX ADMIN — IPRATROPIUM BROMIDE AND ALBUTEROL SULFATE 1 AMPULE: 2.5; .5 SOLUTION RESPIRATORY (INHALATION) at 13:54

## 2018-05-29 RX ADMIN — BUMETANIDE 1 MG: 0.5 TABLET ORAL at 08:41

## 2018-05-29 RX ADMIN — ISOSORBIDE MONONITRATE 60 MG: 60 TABLET, EXTENDED RELEASE ORAL at 08:41

## 2018-05-29 RX ADMIN — Medication 10 ML: at 22:32

## 2018-05-29 RX ADMIN — DULOXETINE 60 MG: 60 CAPSULE, DELAYED RELEASE ORAL at 08:41

## 2018-05-29 RX ADMIN — POTASSIUM CHLORIDE 20 MEQ: 20 TABLET, EXTENDED RELEASE ORAL at 08:41

## 2018-05-29 RX ADMIN — ASPIRIN 81 MG: 81 TABLET, COATED ORAL at 08:41

## 2018-05-29 RX ADMIN — AMLODIPINE BESYLATE 10 MG: 10 TABLET ORAL at 08:41

## 2018-05-29 RX ADMIN — IPRATROPIUM BROMIDE AND ALBUTEROL SULFATE 1 AMPULE: 2.5; .5 SOLUTION RESPIRATORY (INHALATION) at 11:01

## 2018-05-29 ASSESSMENT — PAIN SCALES - GENERAL
PAINLEVEL_OUTOF10: 0
PAINLEVEL_OUTOF10: 7

## 2018-05-30 LAB
ANION GAP SERPL CALCULATED.3IONS-SCNC: 13 MMOL/L (ref 7–19)
BASOPHILS ABSOLUTE: 0 K/UL (ref 0–0.2)
BASOPHILS RELATIVE PERCENT: 0.3 % (ref 0–1)
BUN BLDV-MCNC: 39 MG/DL (ref 8–23)
CALCIUM SERPL-MCNC: 8.1 MG/DL (ref 8.8–10.2)
CHLORIDE BLD-SCNC: 100 MMOL/L (ref 98–111)
CO2: 27 MMOL/L (ref 22–29)
CREAT SERPL-MCNC: 1.6 MG/DL (ref 0.5–1.2)
EOSINOPHILS ABSOLUTE: 0.7 K/UL (ref 0–0.6)
EOSINOPHILS RELATIVE PERCENT: 5.4 % (ref 0–5)
GFR NON-AFRICAN AMERICAN: 42
GLUCOSE BLD-MCNC: 134 MG/DL (ref 74–109)
HCT VFR BLD CALC: 26.8 % (ref 42–52)
HEMOGLOBIN: 8.2 G/DL (ref 14–18)
LYMPHOCYTES ABSOLUTE: 1.9 K/UL (ref 1.1–4.5)
LYMPHOCYTES RELATIVE PERCENT: 15.4 % (ref 20–40)
MCH RBC QN AUTO: 25.5 PG (ref 27–31)
MCHC RBC AUTO-ENTMCNC: 30.6 G/DL (ref 33–37)
MCV RBC AUTO: 83.5 FL (ref 80–94)
MONOCYTES ABSOLUTE: 1.2 K/UL (ref 0–0.9)
MONOCYTES RELATIVE PERCENT: 9.3 % (ref 0–10)
NEUTROPHILS ABSOLUTE: 7.6 K/UL (ref 1.5–7.5)
NEUTROPHILS RELATIVE PERCENT: 61.4 % (ref 50–65)
PDW BLD-RTO: 15.8 % (ref 11.5–14.5)
PLATELET # BLD: 263 K/UL (ref 130–400)
PMV BLD AUTO: 10.3 FL (ref 9.4–12.4)
POTASSIUM SERPL-SCNC: 3.4 MMOL/L (ref 3.5–5)
RBC # BLD: 3.21 M/UL (ref 4.7–6.1)
SODIUM BLD-SCNC: 140 MMOL/L (ref 136–145)
WBC # BLD: 12.4 K/UL (ref 4.8–10.8)

## 2018-05-30 PROCEDURE — 6360000002 HC RX W HCPCS: Performed by: NURSE PRACTITIONER

## 2018-05-30 PROCEDURE — 6370000000 HC RX 637 (ALT 250 FOR IP): Performed by: HOSPITALIST

## 2018-05-30 PROCEDURE — 6370000000 HC RX 637 (ALT 250 FOR IP): Performed by: NURSE PRACTITIONER

## 2018-05-30 PROCEDURE — 97535 SELF CARE MNGMENT TRAINING: CPT

## 2018-05-30 PROCEDURE — 94640 AIRWAY INHALATION TREATMENT: CPT

## 2018-05-30 PROCEDURE — 1210000000 HC MED SURG R&B

## 2018-05-30 PROCEDURE — 99024 POSTOP FOLLOW-UP VISIT: CPT | Performed by: SURGERY

## 2018-05-30 PROCEDURE — 2700000000 HC OXYGEN THERAPY PER DAY

## 2018-05-30 PROCEDURE — 6370000000 HC RX 637 (ALT 250 FOR IP): Performed by: INTERNAL MEDICINE

## 2018-05-30 PROCEDURE — G8979 MOBILITY GOAL STATUS: HCPCS

## 2018-05-30 PROCEDURE — G8987 SELF CARE CURRENT STATUS: HCPCS

## 2018-05-30 PROCEDURE — G8978 MOBILITY CURRENT STATUS: HCPCS

## 2018-05-30 PROCEDURE — 36415 COLL VENOUS BLD VENIPUNCTURE: CPT

## 2018-05-30 PROCEDURE — 2580000003 HC RX 258: Performed by: NURSE PRACTITIONER

## 2018-05-30 PROCEDURE — 97162 PT EVAL MOD COMPLEX 30 MIN: CPT

## 2018-05-30 PROCEDURE — G8988 SELF CARE GOAL STATUS: HCPCS

## 2018-05-30 PROCEDURE — 97165 OT EVAL LOW COMPLEX 30 MIN: CPT

## 2018-05-30 PROCEDURE — 6360000002 HC RX W HCPCS: Performed by: SURGERY

## 2018-05-30 PROCEDURE — 2580000003 HC RX 258: Performed by: SURGERY

## 2018-05-30 PROCEDURE — 85025 COMPLETE CBC W/AUTO DIFF WBC: CPT

## 2018-05-30 PROCEDURE — 80048 BASIC METABOLIC PNL TOTAL CA: CPT

## 2018-05-30 RX ADMIN — VANCOMYCIN HYDROCHLORIDE 1250 MG: 5 INJECTION, POWDER, LYOPHILIZED, FOR SOLUTION INTRAVENOUS at 13:39

## 2018-05-30 RX ADMIN — ASPIRIN 81 MG: 81 TABLET, COATED ORAL at 08:10

## 2018-05-30 RX ADMIN — POTASSIUM CHLORIDE 20 MEQ: 20 TABLET, EXTENDED RELEASE ORAL at 08:10

## 2018-05-30 RX ADMIN — Medication 2 G: at 23:26

## 2018-05-30 RX ADMIN — Medication 0.5 MG: at 19:48

## 2018-05-30 RX ADMIN — IPRATROPIUM BROMIDE AND ALBUTEROL SULFATE 1 AMPULE: 2.5; .5 SOLUTION RESPIRATORY (INHALATION) at 11:06

## 2018-05-30 RX ADMIN — BUMETANIDE 1 MG: 0.5 TABLET ORAL at 05:52

## 2018-05-30 RX ADMIN — IPRATROPIUM BROMIDE AND ALBUTEROL SULFATE 1 AMPULE: 2.5; .5 SOLUTION RESPIRATORY (INHALATION) at 20:02

## 2018-05-30 RX ADMIN — ATORVASTATIN CALCIUM 40 MG: 40 TABLET, FILM COATED ORAL at 19:48

## 2018-05-30 RX ADMIN — ISOSORBIDE MONONITRATE 60 MG: 60 TABLET, EXTENDED RELEASE ORAL at 08:10

## 2018-05-30 RX ADMIN — Medication 0.5 MG: at 12:06

## 2018-05-30 RX ADMIN — AMLODIPINE BESYLATE 10 MG: 10 TABLET ORAL at 08:10

## 2018-05-30 RX ADMIN — AMIODARONE HYDROCHLORIDE 200 MG: 200 TABLET ORAL at 08:10

## 2018-05-30 RX ADMIN — DULOXETINE 60 MG: 60 CAPSULE, DELAYED RELEASE ORAL at 08:10

## 2018-05-30 RX ADMIN — IPRATROPIUM BROMIDE AND ALBUTEROL SULFATE 1 AMPULE: 2.5; .5 SOLUTION RESPIRATORY (INHALATION) at 15:12

## 2018-05-30 RX ADMIN — Medication 10 ML: at 12:45

## 2018-05-30 RX ADMIN — Medication 2 G: at 12:06

## 2018-05-30 RX ADMIN — Medication 10 ML: at 19:49

## 2018-05-30 RX ADMIN — BUMETANIDE 1 MG: 0.5 TABLET ORAL at 18:46

## 2018-05-30 RX ADMIN — IPRATROPIUM BROMIDE AND ALBUTEROL SULFATE 1 AMPULE: 2.5; .5 SOLUTION RESPIRATORY (INHALATION) at 07:33

## 2018-05-30 ASSESSMENT — PAIN SCALES - GENERAL
PAINLEVEL_OUTOF10: 3
PAINLEVEL_OUTOF10: 7
PAINLEVEL_OUTOF10: 0
PAINLEVEL_OUTOF10: 5
PAINLEVEL_OUTOF10: 3

## 2018-05-31 LAB
ANION GAP SERPL CALCULATED.3IONS-SCNC: 12 MMOL/L (ref 7–19)
BASOPHILS ABSOLUTE: 0 K/UL (ref 0–0.2)
BASOPHILS RELATIVE PERCENT: 0.2 % (ref 0–1)
BUN BLDV-MCNC: 44 MG/DL (ref 8–23)
CALCIUM SERPL-MCNC: 8.1 MG/DL (ref 8.8–10.2)
CHLORIDE BLD-SCNC: 97 MMOL/L (ref 98–111)
CO2: 25 MMOL/L (ref 22–29)
CREAT SERPL-MCNC: 1.6 MG/DL (ref 0.5–1.2)
EOSINOPHILS ABSOLUTE: 0.6 K/UL (ref 0–0.6)
EOSINOPHILS RELATIVE PERCENT: 5 % (ref 0–5)
GFR NON-AFRICAN AMERICAN: 42
GLUCOSE BLD-MCNC: 140 MG/DL (ref 74–109)
HCT VFR BLD CALC: 26.9 % (ref 42–52)
HEMOGLOBIN: 8.3 G/DL (ref 14–18)
LYMPHOCYTES ABSOLUTE: 1.7 K/UL (ref 1.1–4.5)
LYMPHOCYTES RELATIVE PERCENT: 13.8 % (ref 20–40)
MCH RBC QN AUTO: 25.9 PG (ref 27–31)
MCHC RBC AUTO-ENTMCNC: 30.9 G/DL (ref 33–37)
MCV RBC AUTO: 84.1 FL (ref 80–94)
MONOCYTES ABSOLUTE: 1.4 K/UL (ref 0–0.9)
MONOCYTES RELATIVE PERCENT: 12 % (ref 0–10)
NEUTROPHILS ABSOLUTE: 7.5 K/UL (ref 1.5–7.5)
NEUTROPHILS RELATIVE PERCENT: 62.8 % (ref 50–65)
PDW BLD-RTO: 16.2 % (ref 11.5–14.5)
PLATELET # BLD: 233 K/UL (ref 130–400)
PMV BLD AUTO: 10.5 FL (ref 9.4–12.4)
POTASSIUM SERPL-SCNC: 3.4 MMOL/L (ref 3.5–5)
RBC # BLD: 3.2 M/UL (ref 4.7–6.1)
SODIUM BLD-SCNC: 134 MMOL/L (ref 136–145)
WBC # BLD: 11.9 K/UL (ref 4.8–10.8)

## 2018-05-31 PROCEDURE — 6370000000 HC RX 637 (ALT 250 FOR IP): Performed by: INTERNAL MEDICINE

## 2018-05-31 PROCEDURE — 2580000003 HC RX 258: Performed by: SURGERY

## 2018-05-31 PROCEDURE — 80048 BASIC METABOLIC PNL TOTAL CA: CPT

## 2018-05-31 PROCEDURE — 99024 POSTOP FOLLOW-UP VISIT: CPT | Performed by: SURGERY

## 2018-05-31 PROCEDURE — 97110 THERAPEUTIC EXERCISES: CPT

## 2018-05-31 PROCEDURE — 2700000000 HC OXYGEN THERAPY PER DAY

## 2018-05-31 PROCEDURE — 1210000000 HC MED SURG R&B

## 2018-05-31 PROCEDURE — 2580000003 HC RX 258: Performed by: NURSE PRACTITIONER

## 2018-05-31 PROCEDURE — 6370000000 HC RX 637 (ALT 250 FOR IP): Performed by: NURSE PRACTITIONER

## 2018-05-31 PROCEDURE — 94640 AIRWAY INHALATION TREATMENT: CPT

## 2018-05-31 PROCEDURE — 85025 COMPLETE CBC W/AUTO DIFF WBC: CPT

## 2018-05-31 PROCEDURE — 6360000002 HC RX W HCPCS: Performed by: NURSE PRACTITIONER

## 2018-05-31 PROCEDURE — 36415 COLL VENOUS BLD VENIPUNCTURE: CPT

## 2018-05-31 PROCEDURE — 97116 GAIT TRAINING THERAPY: CPT

## 2018-05-31 PROCEDURE — 6360000002 HC RX W HCPCS: Performed by: SURGERY

## 2018-05-31 PROCEDURE — 6370000000 HC RX 637 (ALT 250 FOR IP): Performed by: HOSPITALIST

## 2018-05-31 RX ORDER — LIDOCAINE HYDROCHLORIDE 40 MG/ML
SOLUTION TOPICAL
Status: DISCONTINUED | OUTPATIENT
Start: 2018-06-01 | End: 2018-06-01

## 2018-05-31 RX ADMIN — ASPIRIN 81 MG: 81 TABLET, COATED ORAL at 08:53

## 2018-05-31 RX ADMIN — BUMETANIDE 1 MG: 0.5 TABLET ORAL at 06:16

## 2018-05-31 RX ADMIN — Medication 10 ML: at 21:40

## 2018-05-31 RX ADMIN — Medication 0.5 MG: at 23:01

## 2018-05-31 RX ADMIN — AMLODIPINE BESYLATE 10 MG: 10 TABLET ORAL at 08:53

## 2018-05-31 RX ADMIN — Medication 2 G: at 08:53

## 2018-05-31 RX ADMIN — POTASSIUM CHLORIDE 20 MEQ: 20 TABLET, EXTENDED RELEASE ORAL at 08:53

## 2018-05-31 RX ADMIN — VANCOMYCIN HYDROCHLORIDE 1250 MG: 5 INJECTION, POWDER, LYOPHILIZED, FOR SOLUTION INTRAVENOUS at 21:39

## 2018-05-31 RX ADMIN — Medication 2 G: at 21:39

## 2018-05-31 RX ADMIN — IPRATROPIUM BROMIDE AND ALBUTEROL SULFATE 1 AMPULE: 2.5; .5 SOLUTION RESPIRATORY (INHALATION) at 19:30

## 2018-05-31 RX ADMIN — ATORVASTATIN CALCIUM 40 MG: 40 TABLET, FILM COATED ORAL at 21:39

## 2018-05-31 RX ADMIN — ISOSORBIDE MONONITRATE 60 MG: 60 TABLET, EXTENDED RELEASE ORAL at 08:53

## 2018-05-31 RX ADMIN — BUMETANIDE 1 MG: 0.5 TABLET ORAL at 17:22

## 2018-05-31 RX ADMIN — IPRATROPIUM BROMIDE AND ALBUTEROL SULFATE 1 AMPULE: 2.5; .5 SOLUTION RESPIRATORY (INHALATION) at 14:36

## 2018-05-31 RX ADMIN — AMIODARONE HYDROCHLORIDE 200 MG: 200 TABLET ORAL at 08:54

## 2018-05-31 RX ADMIN — Medication 10 ML: at 08:54

## 2018-05-31 RX ADMIN — DULOXETINE 60 MG: 60 CAPSULE, DELAYED RELEASE ORAL at 08:54

## 2018-05-31 RX ADMIN — IPRATROPIUM BROMIDE AND ALBUTEROL SULFATE 1 AMPULE: 2.5; .5 SOLUTION RESPIRATORY (INHALATION) at 09:22

## 2018-05-31 RX ADMIN — VANCOMYCIN HYDROCHLORIDE 1250 MG: 5 INJECTION, POWDER, LYOPHILIZED, FOR SOLUTION INTRAVENOUS at 04:01

## 2018-05-31 RX ADMIN — IPRATROPIUM BROMIDE AND ALBUTEROL SULFATE 1 AMPULE: 2.5; .5 SOLUTION RESPIRATORY (INHALATION) at 05:59

## 2018-05-31 ASSESSMENT — PAIN SCALES - GENERAL
PAINLEVEL_OUTOF10: 8
PAINLEVEL_OUTOF10: 1
PAINLEVEL_OUTOF10: 8

## 2018-05-31 ASSESSMENT — PAIN DESCRIPTION - ORIENTATION: ORIENTATION: RIGHT

## 2018-05-31 ASSESSMENT — PAIN DESCRIPTION - PAIN TYPE: TYPE: ACUTE PAIN

## 2018-05-31 ASSESSMENT — PAIN DESCRIPTION - LOCATION: LOCATION: GROIN

## 2018-06-01 VITALS
TEMPERATURE: 98.1 F | OXYGEN SATURATION: 97 % | RESPIRATION RATE: 16 BRPM | HEART RATE: 70 BPM | DIASTOLIC BLOOD PRESSURE: 64 MMHG | BODY MASS INDEX: 24.53 KG/M2 | HEIGHT: 72 IN | WEIGHT: 181.13 LBS | SYSTOLIC BLOOD PRESSURE: 140 MMHG

## 2018-06-01 LAB
ANION GAP SERPL CALCULATED.3IONS-SCNC: 12 MMOL/L (ref 7–19)
BASOPHILS ABSOLUTE: 0.1 K/UL (ref 0–0.2)
BASOPHILS RELATIVE PERCENT: 0.5 % (ref 0–1)
BUN BLDV-MCNC: 42 MG/DL (ref 8–23)
CALCIUM SERPL-MCNC: 8.1 MG/DL (ref 8.8–10.2)
CHLORIDE BLD-SCNC: 100 MMOL/L (ref 98–111)
CO2: 26 MMOL/L (ref 22–29)
CREAT SERPL-MCNC: 1.5 MG/DL (ref 0.5–1.2)
EOSINOPHILS ABSOLUTE: 0.5 K/UL (ref 0–0.6)
EOSINOPHILS RELATIVE PERCENT: 4.9 % (ref 0–5)
GFR NON-AFRICAN AMERICAN: 45
GLUCOSE BLD-MCNC: 126 MG/DL (ref 74–109)
HCT VFR BLD CALC: 26 % (ref 42–52)
HEMOGLOBIN: 7.9 G/DL (ref 14–18)
LYMPHOCYTES ABSOLUTE: 1.8 K/UL (ref 1.1–4.5)
LYMPHOCYTES RELATIVE PERCENT: 17.4 % (ref 20–40)
MCH RBC QN AUTO: 25.6 PG (ref 27–31)
MCHC RBC AUTO-ENTMCNC: 30.4 G/DL (ref 33–37)
MCV RBC AUTO: 84.1 FL (ref 80–94)
MONOCYTES ABSOLUTE: 1.2 K/UL (ref 0–0.9)
MONOCYTES RELATIVE PERCENT: 12 % (ref 0–10)
NEUTROPHILS ABSOLUTE: 6.1 K/UL (ref 1.5–7.5)
NEUTROPHILS RELATIVE PERCENT: 59.7 % (ref 50–65)
PDW BLD-RTO: 16.2 % (ref 11.5–14.5)
PLATELET # BLD: 222 K/UL (ref 130–400)
PMV BLD AUTO: 10.1 FL (ref 9.4–12.4)
POTASSIUM SERPL-SCNC: 3.6 MMOL/L (ref 3.5–5)
RBC # BLD: 3.09 M/UL (ref 4.7–6.1)
SODIUM BLD-SCNC: 138 MMOL/L (ref 136–145)
WBC # BLD: 10.2 K/UL (ref 4.8–10.8)

## 2018-06-01 PROCEDURE — 2700000000 HC OXYGEN THERAPY PER DAY

## 2018-06-01 PROCEDURE — 2580000003 HC RX 258: Performed by: SURGERY

## 2018-06-01 PROCEDURE — 80048 BASIC METABOLIC PNL TOTAL CA: CPT

## 2018-06-01 PROCEDURE — 36415 COLL VENOUS BLD VENIPUNCTURE: CPT

## 2018-06-01 PROCEDURE — 6370000000 HC RX 637 (ALT 250 FOR IP): Performed by: INTERNAL MEDICINE

## 2018-06-01 PROCEDURE — 2580000003 HC RX 258: Performed by: NURSE PRACTITIONER

## 2018-06-01 PROCEDURE — 6370000000 HC RX 637 (ALT 250 FOR IP): Performed by: NURSE PRACTITIONER

## 2018-06-01 PROCEDURE — 94640 AIRWAY INHALATION TREATMENT: CPT

## 2018-06-01 PROCEDURE — 6370000000 HC RX 637 (ALT 250 FOR IP): Performed by: SURGERY

## 2018-06-01 PROCEDURE — 97116 GAIT TRAINING THERAPY: CPT

## 2018-06-01 PROCEDURE — 99024 POSTOP FOLLOW-UP VISIT: CPT | Performed by: SURGERY

## 2018-06-01 PROCEDURE — 6360000002 HC RX W HCPCS: Performed by: NURSE PRACTITIONER

## 2018-06-01 PROCEDURE — 6370000000 HC RX 637 (ALT 250 FOR IP): Performed by: HOSPITALIST

## 2018-06-01 PROCEDURE — 85025 COMPLETE CBC W/AUTO DIFF WBC: CPT

## 2018-06-01 RX ORDER — LIDOCAINE HYDROCHLORIDE 40 MG/ML
SOLUTION TOPICAL
Status: DISCONTINUED | OUTPATIENT
Start: 2018-06-01 | End: 2018-06-01

## 2018-06-01 RX ORDER — AMOXICILLIN AND CLAVULANATE POTASSIUM 500; 125 MG/1; MG/1
1 TABLET, FILM COATED ORAL 3 TIMES DAILY
Qty: 30 TABLET | Refills: 0 | Status: SHIPPED | OUTPATIENT
Start: 2018-06-01 | End: 2018-06-11

## 2018-06-01 RX ORDER — HYDROCODONE BITARTRATE AND ACETAMINOPHEN 10; 325 MG/1; MG/1
1 TABLET ORAL EVERY 8 HOURS PRN
Qty: 30 TABLET | Refills: 0 | Status: SHIPPED | OUTPATIENT
Start: 2018-06-01 | End: 2018-06-11

## 2018-06-01 RX ORDER — LIDOCAINE HYDROCHLORIDE 40 MG/ML
SOLUTION TOPICAL
Status: DISCONTINUED | OUTPATIENT
Start: 2018-06-01 | End: 2018-06-01 | Stop reason: HOSPADM

## 2018-06-01 RX ADMIN — IPRATROPIUM BROMIDE AND ALBUTEROL SULFATE 1 AMPULE: 2.5; .5 SOLUTION RESPIRATORY (INHALATION) at 10:30

## 2018-06-01 RX ADMIN — DULOXETINE 60 MG: 60 CAPSULE, DELAYED RELEASE ORAL at 08:20

## 2018-06-01 RX ADMIN — IPRATROPIUM BROMIDE AND ALBUTEROL SULFATE 1 AMPULE: 2.5; .5 SOLUTION RESPIRATORY (INHALATION) at 06:07

## 2018-06-01 RX ADMIN — Medication 10 ML: at 08:20

## 2018-06-01 RX ADMIN — LIDOCAINE HYDROCHLORIDE: 40 SOLUTION TOPICAL at 08:19

## 2018-06-01 RX ADMIN — IPRATROPIUM BROMIDE AND ALBUTEROL SULFATE 1 AMPULE: 2.5; .5 SOLUTION RESPIRATORY (INHALATION) at 14:21

## 2018-06-01 RX ADMIN — POTASSIUM CHLORIDE 20 MEQ: 20 TABLET, EXTENDED RELEASE ORAL at 08:19

## 2018-06-01 RX ADMIN — AMLODIPINE BESYLATE 10 MG: 10 TABLET ORAL at 08:19

## 2018-06-01 RX ADMIN — AMIODARONE HYDROCHLORIDE 200 MG: 200 TABLET ORAL at 08:19

## 2018-06-01 RX ADMIN — ASPIRIN 81 MG: 81 TABLET, COATED ORAL at 08:19

## 2018-06-01 RX ADMIN — ISOSORBIDE MONONITRATE 60 MG: 60 TABLET, EXTENDED RELEASE ORAL at 08:19

## 2018-06-01 RX ADMIN — BUMETANIDE 1 MG: 0.5 TABLET ORAL at 05:38

## 2018-06-01 RX ADMIN — Medication 2 G: at 10:53

## 2018-06-01 ASSESSMENT — PAIN SCALES - GENERAL: PAINLEVEL_OUTOF10: 1

## 2018-06-04 ENCOUNTER — TELEPHONE (OUTPATIENT)
Dept: INTERNAL MEDICINE | Age: 78
End: 2018-06-04

## 2018-06-11 ENCOUNTER — OFFICE VISIT (OUTPATIENT)
Dept: VASCULAR SURGERY | Age: 78
End: 2018-06-11

## 2018-06-11 VITALS
DIASTOLIC BLOOD PRESSURE: 64 MMHG | TEMPERATURE: 97.1 F | HEART RATE: 69 BPM | SYSTOLIC BLOOD PRESSURE: 126 MMHG | RESPIRATION RATE: 18 BRPM

## 2018-06-11 DIAGNOSIS — Z09 POSTOP CHECK: Primary | ICD-10-CM

## 2018-06-11 PROCEDURE — 99024 POSTOP FOLLOW-UP VISIT: CPT | Performed by: PHYSICIAN ASSISTANT

## 2018-06-25 ENCOUNTER — OFFICE VISIT (OUTPATIENT)
Dept: VASCULAR SURGERY | Age: 78
End: 2018-06-25

## 2018-06-25 VITALS
RESPIRATION RATE: 18 BRPM | DIASTOLIC BLOOD PRESSURE: 66 MMHG | HEART RATE: 80 BPM | SYSTOLIC BLOOD PRESSURE: 119 MMHG | TEMPERATURE: 97 F

## 2018-06-25 DIAGNOSIS — I70.213 ATHEROSCLEROSIS OF NATIVE ARTERY OF BOTH LOWER EXTREMITIES WITH INTERMITTENT CLAUDICATION (HCC): Primary | ICD-10-CM

## 2018-06-25 PROCEDURE — 99024 POSTOP FOLLOW-UP VISIT: CPT | Performed by: NURSE PRACTITIONER

## 2018-06-25 RX ORDER — FLUCONAZOLE 150 MG/1
150 TABLET ORAL DAILY
Qty: 3 TABLET | Refills: 0 | Status: SHIPPED | OUTPATIENT
Start: 2018-06-25 | End: 2018-06-28

## 2018-06-29 RX ORDER — DULOXETIN HYDROCHLORIDE 60 MG/1
60 CAPSULE, DELAYED RELEASE ORAL DAILY
Qty: 90 CAPSULE | Refills: 1 | Status: SHIPPED | OUTPATIENT
Start: 2018-06-29 | End: 2019-01-28 | Stop reason: DRUGHIGH

## 2018-06-29 RX ORDER — DULOXETIN HYDROCHLORIDE 60 MG/1
60 CAPSULE, DELAYED RELEASE ORAL DAILY
COMMUNITY
End: 2018-06-29 | Stop reason: SDUPTHER

## 2018-07-09 ENCOUNTER — TELEPHONE (OUTPATIENT)
Dept: VASCULAR SURGERY | Age: 78
End: 2018-07-09

## 2018-07-09 ENCOUNTER — OFFICE VISIT (OUTPATIENT)
Dept: VASCULAR SURGERY | Age: 78
End: 2018-07-09

## 2018-07-09 VITALS
TEMPERATURE: 96.5 F | SYSTOLIC BLOOD PRESSURE: 118 MMHG | RESPIRATION RATE: 18 BRPM | HEART RATE: 68 BPM | DIASTOLIC BLOOD PRESSURE: 60 MMHG

## 2018-07-09 DIAGNOSIS — M79.604 LEG PAIN, RIGHT: Primary | ICD-10-CM

## 2018-07-09 DIAGNOSIS — M79.89 LEG SWELLING: ICD-10-CM

## 2018-07-09 DIAGNOSIS — I70.213 ATHEROSCLEROSIS OF NATIVE ARTERY OF BOTH LOWER EXTREMITIES WITH INTERMITTENT CLAUDICATION (HCC): ICD-10-CM

## 2018-07-09 PROCEDURE — 99024 POSTOP FOLLOW-UP VISIT: CPT | Performed by: NURSE PRACTITIONER

## 2018-07-09 NOTE — TELEPHONE ENCOUNTER
Gave verbal orders to D/C wound vac and ordered saline wet to dry dressings to wound twice daily. Yumiko Montero voiced understanding.  OB

## 2018-07-09 NOTE — PROGRESS NOTES
Love Ho is a 69 y.o. male who presents for post op evaluation. Patient had a left groin debridement and muscle flap 5 weeks ago. This was performed by Dr. Luis Perry. Post op symptoms reported none. Post op pain is minimal.      On evaluation today, wound is granulating well without signs of infection. Wound is now superficial and looks good. Today we have recommended he discontinue the vac and will switch to bid wet to dry. We have recommended continued ASA 81 mg po qd daily. We will follow up with him in 2 weeks. This should bring you up to date on Love Ho  As always we want to thank you for allowing us to participate in the care of your patients.     Sincerely,    KANCHAN Black

## 2018-07-09 NOTE — PROGRESS NOTES
Subjective:      Patient ID: Abi Menendez is a 69 y.o. male.     HPI    Review of Systems    Objective:   Physical Exam    Assessment:      ***      Plan:      ***

## 2018-07-10 ENCOUNTER — TELEPHONE (OUTPATIENT)
Dept: VASCULAR SURGERY | Age: 78
End: 2018-07-10

## 2018-07-17 DIAGNOSIS — Z95.810 AICD (AUTOMATIC CARDIOVERTER/DEFIBRILLATOR) PRESENT: Primary | ICD-10-CM

## 2018-07-17 DIAGNOSIS — I47.20 VENTRICULAR TACHYARRHYTHMIA: ICD-10-CM

## 2018-07-17 PROCEDURE — 93295 DEV INTERROG REMOTE 1/2/MLT: CPT | Performed by: CLINICAL NURSE SPECIALIST

## 2018-07-17 PROCEDURE — 93296 REM INTERROG EVL PM/IDS: CPT | Performed by: CLINICAL NURSE SPECIALIST

## 2018-07-23 ENCOUNTER — OFFICE VISIT (OUTPATIENT)
Dept: VASCULAR SURGERY | Age: 78
End: 2018-07-23

## 2018-07-23 VITALS — SYSTOLIC BLOOD PRESSURE: 158 MMHG | HEART RATE: 64 BPM | TEMPERATURE: 97.8 F | DIASTOLIC BLOOD PRESSURE: 70 MMHG

## 2018-07-23 DIAGNOSIS — Z09 POSTOP CHECK: Primary | ICD-10-CM

## 2018-07-23 PROCEDURE — 99024 POSTOP FOLLOW-UP VISIT: CPT | Performed by: PHYSICIAN ASSISTANT

## 2018-08-03 DIAGNOSIS — M54.5 CHRONIC LOW BACK PAIN, UNSPECIFIED BACK PAIN LATERALITY, WITH SCIATICA PRESENCE UNSPECIFIED: Primary | ICD-10-CM

## 2018-08-03 DIAGNOSIS — G89.29 CHRONIC LOW BACK PAIN, UNSPECIFIED BACK PAIN LATERALITY, WITH SCIATICA PRESENCE UNSPECIFIED: Primary | ICD-10-CM

## 2018-08-06 ENCOUNTER — TELEPHONE (OUTPATIENT)
Dept: NEUROSURGERY | Age: 78
End: 2018-08-06

## 2018-08-06 ENCOUNTER — HOSPITAL ENCOUNTER (OUTPATIENT)
Dept: GENERAL RADIOLOGY | Age: 78
Discharge: HOME | End: 2018-08-06
Payer: MEDICARE

## 2018-08-06 ENCOUNTER — OFFICE VISIT (OUTPATIENT)
Dept: NEUROSURGERY | Age: 78
End: 2018-08-06
Payer: MEDICARE

## 2018-08-06 ENCOUNTER — TELEPHONE (OUTPATIENT)
Dept: CARDIOLOGY | Age: 78
End: 2018-08-06

## 2018-08-06 VITALS
HEART RATE: 74 BPM | OXYGEN SATURATION: 95 % | BODY MASS INDEX: 24.38 KG/M2 | DIASTOLIC BLOOD PRESSURE: 69 MMHG | HEIGHT: 72 IN | SYSTOLIC BLOOD PRESSURE: 140 MMHG | WEIGHT: 180 LBS

## 2018-08-06 DIAGNOSIS — M54.42 CHRONIC MIDLINE LOW BACK PAIN WITH LEFT-SIDED SCIATICA: ICD-10-CM

## 2018-08-06 DIAGNOSIS — M79.652 LEFT THIGH PAIN: ICD-10-CM

## 2018-08-06 DIAGNOSIS — M54.89 INTERSCAPULAR PAIN: ICD-10-CM

## 2018-08-06 DIAGNOSIS — G89.29 CHRONIC MIDLINE LOW BACK PAIN WITH LEFT-SIDED SCIATICA: Primary | ICD-10-CM

## 2018-08-06 DIAGNOSIS — G89.29 CHRONIC MIDLINE LOW BACK PAIN WITH LEFT-SIDED SCIATICA: ICD-10-CM

## 2018-08-06 DIAGNOSIS — M54.42 CHRONIC MIDLINE LOW BACK PAIN WITH LEFT-SIDED SCIATICA: Primary | ICD-10-CM

## 2018-08-06 PROCEDURE — 72072 X-RAY EXAM THORAC SPINE 3VWS: CPT

## 2018-08-06 PROCEDURE — 99213 OFFICE O/P EST LOW 20 MIN: CPT | Performed by: NURSE PRACTITIONER

## 2018-08-06 PROCEDURE — 72100 X-RAY EXAM L-S SPINE 2/3 VWS: CPT

## 2018-08-06 RX ORDER — DEXAMETHASONE 6 MG/1
6 TABLET ORAL 2 TIMES DAILY WITH MEALS
COMMUNITY
End: 2020-05-13

## 2018-08-06 RX ORDER — LEVOFLOXACIN 500 MG/1
500 TABLET, FILM COATED ORAL DAILY
COMMUNITY
End: 2018-08-13 | Stop reason: ALTCHOICE

## 2018-08-06 RX ORDER — MELOXICAM 7.5 MG/1
7.5 TABLET ORAL DAILY
COMMUNITY
End: 2019-01-28 | Stop reason: ALTCHOICE

## 2018-08-06 ASSESSMENT — ENCOUNTER SYMPTOMS
SPUTUM PRODUCTION: 0
COUGH: 0
HEMOPTYSIS: 0
GASTROINTESTINAL NEGATIVE: 1
BACK PAIN: 1
WHEEZING: 0
SHORTNESS OF BREATH: 1
EYES NEGATIVE: 1

## 2018-08-06 NOTE — PROGRESS NOTES
[facility] Office Visit    Patient:   Heather Samuels  MR#:    [id number]      YOB: 1940  Date of Evaluation:  8/6/2018  Time of Note:                          4:14 PM  Primary/Referring Physician:  KANCHAN Aguirre   Note Author:   KANCHAN Alatorre        Chief Complaint   Patient presents with    Follow-up     patient having more pain - same as before surgery in 12/20/17       HISTORY OF PRESENT ILLNESS:      Heather Samuels is a 69 y.o. male who underwent a L3-4 decompressive laminectomy using minimally invasive technique for spinal stenosis of lumbar region on 12/20/2017 and now he is 8 months out from his surgery. Prior to surgery he complained of low back pain at the belt line. The pain did radiate into the left leg intermittently. He denied numbness. He could not stand for very long periods of time, he had frequent falls. Today he states that his back and bilateral leg pain with L>R. He describes the back pain as radiating into the posterolateral thigh. Standing and walking make the pain worse. The pain started a little over 1 week ago. The daughter reports that he has been getting high doses of steroids and knows that this can effect the bone quality. He does states that the back pain is definitely worse than the leg pain. 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 Haja Somers CAD (coronary artery disease)     a. History of open-heart surgery X6, 01/12/06, utilizing the left VANCE to the LAD, sequential vein graft to the intermeditate and obtuse marginal, individually reverse saphenous vein graft to the acute maginal of the PDA and PLOM. b. Stress echocardiogram 04/22/08, negative for myocardial ischemia.     Carotid artery stenosis     Chronic kidney disease     Chronic kidney disease (CKD), stage III (moderate)     with right GSV patch, Left CFA 5F-6F sheath, RLE Angiogram, Right SFA/Popliteal balloon atherectomy with CSI 2.0 solid crown, Right Popliteal balloon angoplasty 5x150 lutonix, Right SFA Balloon Angioplasty 6x100 lutonix,    VASCULAR SURGERY  04/24/18 [facility]    1. Right common femoral/superficial femoral artery endarterectomy with (R) GSV Patch. 2. Left CFA 5f-6f sheath. 3. RLE A'gram. 4. (R) SFA/popliteal atherectomy with CSI 2.0 solid crown. 5.(R) popliteal balloon angioplasty 5x150 lutonix 6. (R)SFA Balloon a'plasty 6x250, 6x100 LUTONIX 7. Completion RLE A'gram 8.  (L) CFA MYNX        Medications    Current Outpatient Prescriptions:     dexamethasone (DECADRON) 6 MG tablet, Take 6 mg by mouth 2 times daily (with meals), Disp: , Rfl:     levofloxacin (LEVAQUIN) 500 MG tablet, Take 500 mg by mouth daily, Disp: , Rfl:     meloxicam (MOBIC) 7.5 MG tablet, Take 7.5 mg by mouth daily, Disp: , Rfl:     DULoxetine (CYMBALTA) 60 MG extended release capsule, Take 1 capsule by mouth daily, Disp: 90 capsule, Rfl: 1    Pediatric Multivitamins-Fl (MULTI VIT/FL PO), Take by mouth, Disp: , Rfl:     valsartan (DIOVAN) 160 MG tablet, Take 160 mg by mouth daily, Disp: , Rfl:     Fluticasone-Umeclidin-Vilant (TRELEGY ELLIPTA) 100-62.5-25 MCG/INH AEPB, Inhale 1 puff into the lungs daily, Disp: , Rfl:     OXYGEN, Inhale 4 L into the lungs as needed, Disp: , Rfl:     aspirin 81 MG tablet, Take 81 mg by mouth daily, Disp: , Rfl:     atorvastatin (LIPITOR) 40 MG tablet, TAKE 1 TABLET DAILY, Disp: 90 tablet, Rfl: 3    bumetanide (BUMEX) 1 MG tablet, Take 1 tablet by mouth 2 times daily (Patient taking differently: Take 1 mg by mouth daily ), Disp: 180 tablet, Rfl: 1    docusate sodium (COLACE) 100 MG capsule, Take 1 capsule by mouth 2 times daily as needed for Constipation, Disp: 30 capsule, Rfl: 1    amiodarone (CORDARONE) 200 MG tablet, Take 1 tablet by mouth daily, Disp: 90 tablet, Rfl: 3    isosorbide mononitrate (IMDUR) 60 Conjugate gaze  Ear, nose, throat -No scars, masses, or lesions over external nose or ears, no atrophy of tongue  Neck-symmetric, no masses noted, no jugular vein distension  Respiration- chest wall appears symmetric, good expansion, normal effort without use of accessory muscles  Musculoskeletal - no significant wasting of muscles noted, no bony deformities, gait no gross ataxia  Extremities-no clubbing, cyanosis or edema  Skin - warm, dry, and intact. No rash, erythema, or pallor. Psychiatric - mood, affect, and behavior appear normal.     Neurologic Examinaiton  Awake, Alert and oriented x 3  Normal speech pattern, following commands  LARA well  Decrease to pinprick RLE      Transported in a wheelchair at today's appointment. Wound: Well healed       DATA and IMAGING:    Lab Results   Component Value Date    WBC 10.2 06/01/2018    HGB 7.9 (L) 06/01/2018    HCT 26.0 (L) 06/01/2018    MCV 84.1 06/01/2018     06/01/2018     Lab Results   Component Value Date     06/01/2018    K 3.6 06/01/2018     06/01/2018    CO2 26 06/01/2018    BUN 42 (H) 06/01/2018    CREATININE 1.5 (H) 06/01/2018    GLUCOSE 126 (H) 06/01/2018    CALCIUM 8.1 (L) 06/01/2018    PROT 6.8 05/22/2018    LABALBU 2.9 (L) 05/22/2018    BILITOT 0.3 05/22/2018    ALKPHOS 92 05/22/2018    AST 89 (H) 05/22/2018     (H) 05/22/2018    LABGLOM 45 (A) 06/01/2018    GLOB 2.3 04/06/2017     Lab Results   Component Value Date    INR 1.20 (H) 05/23/2018    INR 1.10 04/17/2018    INR 1.05 12/12/2017    PROTIME 15.1 (H) 05/23/2018    PROTIME 14.1 04/17/2018    PROTIME 13.6 12/12/2017    No results found. No images to view at today's visit. ASSESSMENT:     Anup Du is a 69 y.o. male who underwent a L3-4 decompressive laminectomy using minimally invasive technique for spinal stenosis of lumbar region on 12/20/2017 and now he is 8 months out from his surgery.     PLAN:  -Obtain XR thoracic and lumbar spine   -Follow up after imaging       ICD-10-CM ICD-9-CM    1. Chronic midline low back pain with left-sided sciatica M54.42 724.2 XR LUMBAR SPINE (2-3 VIEWS)    G89.29 724.3      338.29    2. Left thigh pain M79.652 729.5    3.  Interscapular pain M54.89 724.5 XR THORACIC SPINE (3 VIEWS)        Javi Davis, APRN

## 2018-08-06 NOTE — TELEPHONE ENCOUNTER
Provider will be out of office on 11-6, left msg on pt phone in regards to appt being rescheduled w np.  If pt does not call back today 8-6 will try to contact pt the following day 8-7.    vidya

## 2018-08-06 NOTE — TELEPHONE ENCOUNTER
Patient is having back pain and is needing to be seen.  He was set up for an appt today with Joann at 130

## 2018-08-09 ENCOUNTER — PREP FOR PROCEDURE (OUTPATIENT)
Dept: NEUROSURGERY | Age: 78
End: 2018-08-09

## 2018-08-09 ENCOUNTER — OFFICE VISIT (OUTPATIENT)
Dept: NEUROSURGERY | Age: 78
End: 2018-08-09
Payer: MEDICARE

## 2018-08-09 VITALS
BODY MASS INDEX: 24.38 KG/M2 | DIASTOLIC BLOOD PRESSURE: 89 MMHG | OXYGEN SATURATION: 96 % | WEIGHT: 180 LBS | SYSTOLIC BLOOD PRESSURE: 152 MMHG | HEART RATE: 73 BPM | HEIGHT: 72 IN

## 2018-08-09 DIAGNOSIS — R26.2 DIFFICULTY WALKING: ICD-10-CM

## 2018-08-09 DIAGNOSIS — G89.29 CHRONIC MIDLINE LOW BACK PAIN WITH LEFT-SIDED SCIATICA: ICD-10-CM

## 2018-08-09 DIAGNOSIS — S32.050A CLOSED COMPRESSION FRACTURE OF FIFTH LUMBAR VERTEBRA, INITIAL ENCOUNTER: Primary | ICD-10-CM

## 2018-08-09 DIAGNOSIS — M54.42 CHRONIC MIDLINE LOW BACK PAIN WITH LEFT-SIDED SCIATICA: ICD-10-CM

## 2018-08-09 PROCEDURE — 99215 OFFICE O/P EST HI 40 MIN: CPT | Performed by: NURSE PRACTITIONER

## 2018-08-09 RX ORDER — SODIUM CHLORIDE 0.9 % (FLUSH) 0.9 %
10 SYRINGE (ML) INJECTION EVERY 12 HOURS SCHEDULED
Status: CANCELLED | OUTPATIENT
Start: 2018-08-09 | End: 2019-08-09

## 2018-08-09 RX ORDER — SODIUM CHLORIDE 0.9 % (FLUSH) 0.9 %
10 SYRINGE (ML) INJECTION PRN
Status: CANCELLED | OUTPATIENT
Start: 2018-08-09 | End: 2019-08-09

## 2018-08-09 ASSESSMENT — ENCOUNTER SYMPTOMS
GASTROINTESTINAL NEGATIVE: 1
COUGH: 0
HEMOPTYSIS: 0
BACK PAIN: 1
SHORTNESS OF BREATH: 1
EYES NEGATIVE: 1
SPUTUM PRODUCTION: 0
WHEEZING: 0

## 2018-08-09 NOTE — PROGRESS NOTES
[facility] Office Visit    Patient:   Samuel David  MR#:    [id number]      YOB: 1940  Date of Evaluation:  8/9/2018  Time of Note:                          5:58 PM  Primary/Referring Physician:  KANCHAN Sequeira   Note Author:   KANCHAN Mukherjee        Chief Complaint   Patient presents with    Follow-up     discuss possible Kypho - new compression fracture       HISTORY OF PRESENT ILLNESS:       Samuel David is a 69 y.o. male who underwent a L3-4 decompressive laminectomy using minimally invasive technique for spinal stenosis of lumbar region on 12/20/2017 and now he is 8 months out from his surgery. Prior to surgery he complained of low back pain at the belt line. The pain did radiate into the left leg intermittently. He denied numbness. He could not stand for very long periods of time, he had frequent falls. Today he states that his back and bilateral leg pain with L>R. He describes the back pain as radiating into the left posterolateral thigh only when up and walking. Standing and walking make the pain worse. The pain started about 2 weeks ago. The daughter reports that he has been getting high doses of steroids and knows that this can effect the bone quality. He does states that the back pain is definitely worse than the leg pain. He is no longer taking his ASA. 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 Chyna Hall CAD (coronary artery disease)     a. History of open-heart surgery X6, 01/12/06, utilizing the left VANCE to the LAD, sequential vein graft to the intermeditate and obtuse marginal, individually reverse saphenous vein graft to the acute maginal of the PDA and PLOM. b. Stress echocardiogram 04/22/08, negative for myocardial ischemia.     Carotid artery stenosis     Chronic kidney disease     and well-nourished. Eyes - conjunctiva normal.  Conjugate gaze  Ear, nose, throat -No scars, masses, or lesions over external nose or ears, no atrophy of tongue  Neck-symmetric, no masses noted, no jugular vein distension  Respiration- chest wall appears symmetric, good expansion, normal effort without use of accessory muscles  Musculoskeletal - no significant wasting of muscles noted, no bony deformities, gait no gross ataxia  Extremities-no clubbing, cyanosis or edema  Skin - warm, dry, and intact. No rash, erythema, or pallor. Psychiatric - mood, affect, and behavior appear normal.     Neurologic Examinaiton  Awake, Alert and oriented x 3  Normal speech pattern, following commands  LARA well  Decrease to pinprick RLE      Transported in a wheelchair at today's appointment. Wound: Well healed       DATA and IMAGING:    Lab Results   Component Value Date    WBC 10.2 06/01/2018    HGB 7.9 (L) 06/01/2018    HCT 26.0 (L) 06/01/2018    MCV 84.1 06/01/2018     06/01/2018     Lab Results   Component Value Date     06/01/2018    K 3.6 06/01/2018     06/01/2018    CO2 26 06/01/2018    BUN 42 (H) 06/01/2018    CREATININE 1.5 (H) 06/01/2018    GLUCOSE 126 (H) 06/01/2018    CALCIUM 8.1 (L) 06/01/2018    PROT 6.8 05/22/2018    LABALBU 2.9 (L) 05/22/2018    BILITOT 0.3 05/22/2018    ALKPHOS 92 05/22/2018    AST 89 (H) 05/22/2018     (H) 05/22/2018    LABGLOM 45 (A) 06/01/2018    GLOB 2.3 04/06/2017     Lab Results   Component Value Date    INR 1.20 (H) 05/23/2018    INR 1.10 04/17/2018    INR 1.05 12/12/2017    PROTIME 15.1 (H) 05/23/2018    PROTIME 14.1 04/17/2018    PROTIME 13.6 12/12/2017    No results found. Narrative   Examination. XR THORACIC SPINE (3 VIEWS)   History: Interscapular pain. The frontal and lateral views of the thoracic spine including the   lateral view of the cervicothoracic junction is obtained. There is no   previous study for comparison.    There is no evidence

## 2018-08-09 NOTE — H&P
[facility] Neurosurgery  H&P    Patient:   Olvin Adams  MR#:    [id number]      YOB: 1940  Date of Evaluation:  8/9/2018  Time of Note:                          5:58 PM  Primary/Referring Physician:  KANCHAN Cortez   Note Author:   KANCHAN Crane        Chief Complaint   Patient presents with    Follow-up     discuss possible Kypho - new compression fracture       HISTORY OF PRESENT ILLNESS:       Olvin Adams is a 69 y.o. male who underwent a L3-4 decompressive laminectomy using minimally invasive technique for spinal stenosis of lumbar region on 12/20/2017 and now he is 8 months out from his surgery. Prior to surgery he complained of low back pain at the belt line. The pain did radiate into the left leg intermittently. He denied numbness. He could not stand for very long periods of time, he had frequent falls. Today he states that his back and bilateral leg pain with L>R. He describes the back pain as radiating into the left posterolateral thigh only when up and walking. Standing and walking make the pain worse. The pain started about 2 weeks ago. The daughter reports that he has been getting high doses of steroids and knows that this can effect the bone quality. He does states that the back pain is definitely worse than the leg pain. He is no longer taking his ASA. 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 Kwok Mercury CAD (coronary artery disease)     a. History of open-heart surgery X6, 01/12/06, utilizing the left VANCE to the LAD, sequential vein graft to the intermeditate and obtuse marginal, individually reverse saphenous vein graft to the acute maginal of the PDA and PLOM. b. Stress echocardiogram 04/22/08, negative for myocardial ischemia.     Carotid artery stenosis     Chronic kidney disease     Chronic kidney disease tablet, Rfl: 3    isosorbide mononitrate (IMDUR) 60 MG extended release tablet, Take 1 tablet by mouth daily, Disp: 90 tablet, Rfl: 3    KLOR-CON M20 20 MEQ extended release tablet, Take 20 mEq by mouth daily , Disp: , Rfl:     albuterol (PROVENTIL) (2.5 MG/3ML) 0.083% nebulizer solution, Take 2.5 mg by nebulization every 6 hours as needed for Wheezing, Disp: , Rfl:   Butamben-tetracaine-benzocaine; Ativan [lorazepam]; Bactrim; Biaxin [clarithromycin]; Demerol; Levaquin [levofloxacin]; Lorazepam; and Sulfa antibiotics    Social History  History   Smoking Status    Former Smoker    Packs/day: 1.50    Years: 50.00    Types: Cigarettes    Quit date: 4/17/2015   Smokeless Tobacco    Never Used     History   Alcohol Use    0.6 oz/week    1 Cans of beer per week     Comment: weekly         Family History   Problem Relation Age of Onset    Hypertension Mother     Diabetes Father     Dementia Father     Cancer Father     Colon Cancer Father     Hypertension Father     Cancer Brother     Esophageal Cancer Sister        Review of Systems   Constitutional: Negative for chills, diaphoresis, fever, malaise/fatigue and weight loss. HENT: Negative. Eyes: Negative. Respiratory: Positive for shortness of breath. Negative for cough, hemoptysis, sputum production and wheezing. Cardiovascular: Negative. Gastrointestinal: Negative. Genitourinary: Negative. Not getting the \"done\" feeling   Musculoskeletal: Positive for back pain and myalgias. Negative for falls, joint pain and neck pain. Skin: Negative. Neurological: Positive for weakness (legs). Negative for dizziness, tingling, tremors, sensory change, speech change, focal weakness, seizures, loss of consciousness and headaches. Endo/Heme/Allergies: Negative. Psychiatric/Behavioral: Negative.         PHYSICAL EXAM:  Vitals:    08/09/18 1429   BP: (!) 152/89   Pulse:    SpO2:      Constitutional: The patient appears well-developed and the patient demonstrated a deep understanding. The patient wishes to proceed with surgical intervention. We will schedule for surgery in the near future. ICD-10-CM ICD-9-CM    1. Closed compression fracture of fifth lumbar vertebra, initial encounter ([facility]) S32.050A 805.4 APTT      CBC      Comprehensive Metabolic Panel      EKG 12 Lead      Protime-INR      Type and Screen      Urinalysis Reflex to Culture      XR CHEST STANDARD (2 VW)   2. Chronic midline low back pain with left-sided sciatica M54.42 724.2     G89.29 724.3      338.29    3.  Difficulty walking R26.2 719.7         David Haley APRN

## 2018-08-13 ENCOUNTER — HOSPITAL ENCOUNTER (OUTPATIENT)
Dept: VASCULAR LAB | Age: 78
Discharge: HOME | End: 2018-08-13
Payer: MEDICARE

## 2018-08-13 ENCOUNTER — OFFICE VISIT (OUTPATIENT)
Dept: INTERNAL MEDICINE | Age: 78
End: 2018-08-13
Payer: MEDICARE

## 2018-08-13 ENCOUNTER — OFFICE VISIT (OUTPATIENT)
Dept: VASCULAR SURGERY | Age: 78
End: 2018-08-13
Payer: MEDICARE

## 2018-08-13 ENCOUNTER — TELEPHONE (OUTPATIENT)
Dept: NEUROSURGERY | Age: 78
End: 2018-08-13

## 2018-08-13 VITALS
DIASTOLIC BLOOD PRESSURE: 74 MMHG | WEIGHT: 180 LBS | OXYGEN SATURATION: 97 % | BODY MASS INDEX: 24.38 KG/M2 | RESPIRATION RATE: 16 BRPM | HEIGHT: 72 IN | HEART RATE: 77 BPM | SYSTOLIC BLOOD PRESSURE: 138 MMHG

## 2018-08-13 VITALS
RESPIRATION RATE: 18 BRPM | HEART RATE: 65 BPM | SYSTOLIC BLOOD PRESSURE: 168 MMHG | BODY MASS INDEX: 24.38 KG/M2 | DIASTOLIC BLOOD PRESSURE: 78 MMHG | WEIGHT: 180 LBS | HEIGHT: 72 IN

## 2018-08-13 DIAGNOSIS — M85.80 OSTEOPENIA, UNSPECIFIED LOCATION: ICD-10-CM

## 2018-08-13 DIAGNOSIS — I70.213 ATHEROSCLEROSIS OF NATIVE ARTERY OF BOTH LOWER EXTREMITIES WITH INTERMITTENT CLAUDICATION (HCC): ICD-10-CM

## 2018-08-13 DIAGNOSIS — E55.9 VITAMIN D DEFICIENCY: ICD-10-CM

## 2018-08-13 DIAGNOSIS — M89.9 DISORDER OF BONE: ICD-10-CM

## 2018-08-13 DIAGNOSIS — J18.9 PNEUMONIA DUE TO INFECTIOUS ORGANISM, UNSPECIFIED LATERALITY, UNSPECIFIED PART OF LUNG: ICD-10-CM

## 2018-08-13 DIAGNOSIS — N18.30 CHRONIC KIDNEY DISEASE (CKD), STAGE III (MODERATE) (HCC): ICD-10-CM

## 2018-08-13 DIAGNOSIS — M48.062 LUMBAR STENOSIS WITH NEUROGENIC CLAUDICATION: Primary | ICD-10-CM

## 2018-08-13 DIAGNOSIS — M54.50 SEVERE LOW BACK PAIN: Primary | ICD-10-CM

## 2018-08-13 DIAGNOSIS — D50.9 IRON DEFICIENCY ANEMIA, UNSPECIFIED IRON DEFICIENCY ANEMIA TYPE: ICD-10-CM

## 2018-08-13 DIAGNOSIS — I70.213 ATHEROSCLEROSIS OF NATIVE ARTERY OF BOTH LOWER EXTREMITIES WITH INTERMITTENT CLAUDICATION (HCC): Primary | ICD-10-CM

## 2018-08-13 DIAGNOSIS — I73.9 PVD (PERIPHERAL VASCULAR DISEASE) (HCC): Primary | ICD-10-CM

## 2018-08-13 DIAGNOSIS — I65.23 BILATERAL CAROTID ARTERY STENOSIS: ICD-10-CM

## 2018-08-13 PROCEDURE — 93923 UPR/LXTR ART STDY 3+ LVLS: CPT

## 2018-08-13 PROCEDURE — 93000 ELECTROCARDIOGRAM COMPLETE: CPT | Performed by: NURSE PRACTITIONER

## 2018-08-13 PROCEDURE — 99213 OFFICE O/P EST LOW 20 MIN: CPT | Performed by: PHYSICIAN ASSISTANT

## 2018-08-13 PROCEDURE — 99214 OFFICE O/P EST MOD 30 MIN: CPT | Performed by: NURSE PRACTITIONER

## 2018-08-13 RX ORDER — HYDROCODONE BITARTRATE AND ACETAMINOPHEN 10; 325 MG/1; MG/1
1 TABLET ORAL 2 TIMES DAILY PRN
Qty: 20 TABLET | Refills: 0 | Status: SHIPPED | OUTPATIENT
Start: 2018-08-13 | End: 2018-08-20

## 2018-08-13 RX ORDER — ERGOCALCIFEROL 1.25 MG/1
50000 CAPSULE ORAL WEEKLY
Qty: 30 CAPSULE | Refills: 3 | Status: SHIPPED | OUTPATIENT
Start: 2018-08-13 | End: 2020-05-13

## 2018-08-13 RX ORDER — WHEELCHAIR
1 EACH MISCELLANEOUS DAILY
Qty: 1 EACH | Refills: 0 | Status: SHIPPED | OUTPATIENT
Start: 2018-08-13

## 2018-08-13 ASSESSMENT — ENCOUNTER SYMPTOMS
ABDOMINAL PAIN: 0
VOMITING: 0
EYE ITCHING: 0
WHEEZING: 0
BLOOD IN STOOL: 0
TROUBLE SWALLOWING: 0
CHOKING: 0
NAUSEA: 0
SORE THROAT: 0
COUGH: 0
CONSTIPATION: 0
ABDOMINAL DISTENTION: 0
COLOR CHANGE: 0
STRIDOR: 0
EYE DISCHARGE: 0
SHORTNESS OF BREATH: 0
DIARRHEA: 0
BACK PAIN: 1

## 2018-08-13 NOTE — PROGRESS NOTES
[facility] INTERNAL MEDICINE  [address] [address]  Dept: [phone]  Dept Fax: [phone]  Loc: [phone]    Shavon Medrano is a 69 y.o. male who presents today for his medical conditions/complaints as noted below. Shavon Medrano is c/o of Back Problem (Patient has a fracture in back and is going to need surgery. Patient needs EKG today and daughter wants to go over labs.)        HPI:     HPI   1. Back pain - having surgery next week with Dr Amber Mora ; he is in severe pain all the time; He sits in his dgwyuxyc12 % of the day;  Still has pain the right leg from the recent vascular surgery but the pain in low back and radiation of the pain is reall wearing on him; He is in a wc due to the pain and fear of falling from the instability and pain   2.  ckd  Sees specialist in [address] [place] [address]   3. Vit d def ;  Low in may ;  25.6     4. Anemia; Followed by Dr Deborah Yip; and is getting iron studies as well   5. S;p vascular surgery has appt with Dr Eduard Michel today and having arterial studies today as well   6. Pneumonia; Sees Dr Philomena Trevino in [address] [place] [address] and just finished levaquin 2 days ago; had a 10 day course; this was sputum directed;    7. Has some redness of the left forearm; may be resolving? Will need to get labs   He has all his labs done at [facility] with his daughter who works in the lab  Chief Complaint   Patient presents with    Back Problem     Patient has a fracture in back and is going to need surgery. Patient needs EKG today and daughter wants to go over labs. 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 Nora Freeman CAD (coronary artery disease)     a. History of open-heart surgery X6, 01/12/06, utilizing the left VANCE to the LAD, sequential vein graft to the intermeditate and obtuse marginal, individually reverse saphenous vein graft to the acute maginal of the PDA and PLOM. b. Stress echocardiogram 04/22/08, negative for myocardial ischemia.  Carotid artery stenosis     Chronic kidney disease     Chronic kidney disease (CKD), stage III (moderate)     COPD (chronic obstructive pulmonary disease) (HCC)     Depression     Gastric ulcer 11/06/2004    multiple, duodenal ulcer - Dr. Karan Bernstein History of blood transfusion     Hypercholesterolemia     Hypertension     Thyroid disease     Ulcer 09/08/2010    Rectum.  Ventricular tachyarrhythmia [facility])       Past Surgical History:   Procedure Laterality Date    BACK SURGERY  08/17/2017    CARDIAC CATHETERIZATION  4/22/15  JDT    EF 50%    CARDIAC DEFIBRILLATOR PLACEMENT  04/24/2015    AICD    CARDIAC SURGERY  1/06    Open Heart Surgery (x6)    CATARACT REMOVAL  05    COLON SURGERY  6/26/04    Colon reversal (10/18/2004); Colon re-reversal (10/26/2004).  HERNIA REPAIR  10/26/2006    Ileostomy reversal and hernia repair.  KNEE SURGERY  1996    LAMINEC/FACETECT/FORAMIN,CERVICAL 1 SEG N/A 12/20/2017    L3-4 decompressive laminectomy using minimally invasive technique performed by Lupe Mullen DO at [address] [place] [address]  11/2004    Bleeding ulcers.  OTHER SURGICAL HISTORY  02/14/2007    Debridement of abdominal/with pulse irrigation.  WA REPR BL VES DIRECT,LOW EXTREM Right 5/23/2018    RIGHT GROIN, DEBRIDEMENT AND MUSCLE FLAP  [facility] Right 4/24/2018    LOWER EXTREMITY ANGIOGRAM, BALLOON ANGIOPLASTY, ATHERECTOMY performed by Yoli Nichole MD at [address] Right 4/24/2018    COMMON FEMORAL ARTERY ENDARTERECTOMY performed by Yoli Nichole MD at [address] [address]  05/30/2005    RETINAL DETACHMENT SURGERY  06/21/2005    VASCULAR SURGERY  06/05/2017    [facility]. Left CFA 5f-6-f destination,aortogram with bilateral lower arteriogram,right SFA/Pop atherectomy CSI 1.25 solid,right SFA /pop balloon balloon angioplasty 6x150 mynx left CFA.  VASCULAR SURGERY Right 04/24/18 S    Rigth common femoral/superficial femoral artery endarterectomy with right GSV patch, Left CFA 5F-6F sheath, RLE Angiogram, Right SFA/Popliteal balloon atherectomy with CSI 2.0 solid crown, Right Popliteal balloon angoplasty 5x150 lutonix, Right SFA Balloon Angioplasty 6x100 lutonix,    VASCULAR SURGERY  04/24/18 S    1. Right common femoral/superficial femoral artery endarterectomy with (R) GSV Patch. 2. Left CFA 5f-6f sheath. 3. RLE A'gram. 4. (R) SFA/popliteal atherectomy with CSI 2.0 solid crown. 5.(R) popliteal balloon angioplasty 5x150 lutonix 6. (R)SFA Balloon a'plasty 6x250, 6x100 LUTONIX 7. Completion RLE A'gram 8.  (L) CFA MYNX       Vitals 8/13/2018 8/9/2018 8/9/2018 8/6/2018 8/6/2018 9/34/0553   SYSTOLIC 643 151 [phone] 390   DIASTOLIC 74 89 75 69 78 70   Site - - - - - Right Arm   Pulse 77 - 73 74 70 64   Temp - - - - - -   Resp 16 - - - - -   Weight 180 lb - 180 lb - 180 lb -   Height 6' 0\" - 6' 0\" - 6' 0\" -   BMI (wt*703/ht~2) 24.41 kg/m2 - 24.41 kg/m2 - 24.41 kg/m2 -   Some recent data might be hidden       Family History   Problem Relation Age of Onset    Hypertension Mother     Diabetes Father     Dementia Father     Cancer Father     Colon Cancer Father     Hypertension Father     Cancer Brother     Esophageal Cancer Sister        Social History   Substance Use Topics    Smoking status: Former Smoker     Packs/day: 1.50     Years: 50.00     Types: Cigarettes     Quit date: 4/17/2015    Smokeless tobacco: Never Used    Alcohol use 0.6 oz/week     1 Cans of beer per week      Comment: weekly      Current Outpatient Prescriptions   Medication Sig Dispense Refill    vitamin D (ERGOCALCIFEROL) 32358 units CAPS capsule Take 1 capsule by mouth once a week 30 capsule 3    dexamethasone screening  03/31/1996    Colon cancer screen colonoscopy  03/31/2016    Pneumococcal low/med risk (2 of 2 - PCV13) 06/28/2017    TSH testing  06/13/2018    Flu vaccine (1) 09/01/2018    Potassium monitoring  06/01/2019    Creatinine monitoring  06/01/2019       Subjective:      Review of Systems   Constitutional: Negative for fatigue, fever and unexpected weight change. HENT: Negative for ear discharge, ear pain, mouth sores, sore throat and trouble swallowing. Eyes: Negative for discharge, itching and visual disturbance. Respiratory: Negative for cough, choking, shortness of breath, wheezing and stridor. Cardiovascular: Positive for leg swelling (bilaterally but the left is worse at the post op leg). Negative for chest pain and palpitations. Right leg pain    Gastrointestinal: Negative for abdominal distention, abdominal pain, blood in stool, constipation, diarrhea, nausea and vomiting. Endocrine: Negative for cold intolerance, polydipsia and polyuria. Genitourinary: Negative for difficulty urinating, dysuria, frequency and urgency. Musculoskeletal: Positive for back pain and gait problem. Negative for arthralgias. Skin: Negative for color change and rash. Redness of the left arm    Allergic/Immunologic: Negative for food allergies and immunocompromised state. Neurological: Negative for dizziness, tremors, syncope, speech difficulty, weakness and headaches. Hematological: Negative for adenopathy. Does not bruise/bleed easily. Psychiatric/Behavioral: Negative for confusion and hallucinations. Objective:     Physical Exam   Constitutional: He is oriented to person, place, and time. He appears well-developed and well-nourished. No distress. HENT:   Head: Normocephalic and atraumatic. Eyes: Pupils are equal, round, and reactive to light. Right eye exhibits no discharge. Left eye exhibits no discharge. No scleral icterus. Neck: Normal range of motion. Neck supple. No JVD present. No thyromegaly present. Cardiovascular: Normal rate, regular rhythm and normal heart sounds. No murmur heard. Pulmonary/Chest: Effort normal and breath sounds normal. No respiratory distress. He has no wheezes. He has no rales. Abdominal: Soft. Bowel sounds are normal. He exhibits no distension and no mass. There is no tenderness. There is no rebound and no guarding. Musculoskeletal: He exhibits edema and tenderness. Neurological: He is alert and oriented to person, place, and time. He displays abnormal reflex. No cranial nerve deficit. He exhibits abnormal muscle tone. Coordination abnormal.   He has such pain in his back and legs he can hardly get around at all; Is in a w/c today    Skin: Skin is warm and dry. No rash noted. No erythema. Psychiatric: His behavior is normal. Judgment and thought content normal. He does not exhibit a depressed mood. /74   Pulse 77   Resp 16   Ht 6' (1.829 m)   Wt 180 lb (81.6 kg)   SpO2 97%   BMI 24.41 kg/m²     Assessment:       Diagnosis Orders   1. Lumbar stenosis with neurogenic claudication     2. Chronic kidney disease (CKD), stage III (moderate)     3. Vitamin D deficiency     4. Iron deficiency anemia, unspecified iron deficiency anemia type     5. Atherosclerosis of native artery of both lower extremities with intermittent claudication ([address]     6. Pneumonia due to infectious organism, unspecified laterality, unspecified part of lung     7. Osteopenia, unspecified location  DEXA BONE DENSITY 2 SITES   8. Disorder of bone   DEXA BONE DENSITY 2 SITES     Labs reviewed from 5/2/18  Diagnostics reviewed from 5/2/18  Plan:        Patient given educational materials - see patient instructions. Discussed use, benefit, and side effects of prescribed medications. All patient questions answered. Pt voiced understanding. Reviewed health maintenance. Instructed to continue current medications, diet and exercise.   Patient agreed with treatment plan. Follow up as directed. MEDICATIONS:  Orders Placed This Encounter   Medications    vitamin D (ERGOCALCIFEROL) 21649 units CAPS capsule     Sig: Take 1 capsule by mouth once a week     Dispense:  30 capsule     Refill:  3         ORDERS:  Orders Placed This Encounter   Procedures    DEXA BONE DENSITY 2 SITES    EKG 12 lead       Follow-up:  Return in about 3 months (around 11/13/2018) for awv, yearly physical.    PATIENT INSTRUCTIONS:  Patient Instructions   1. Back pain stable with surgery next week with Dr. Duong;    Labs are being done by his daughter today and EKG done in the office  2.  CKD; Followed by neph in [place];  Labs being done today   3. Vit d def;  Start 50,000 units weekly   4. Anemia; Followed by Dr zazueta  5. S/p vascular surgery; Stable sees Dr Yaquelin Fraser today   6. Pneumonia; Stable we will get culture from "[employer] [place] purchase   7. Redness to left arm; will see what the white count is today  ; He is on decadron 6 mg bid   8. Disorder of the bone; We will get a bone density hopefully they will get scheduled this week    Fu in 3 months     Electronically signed by KANCHAN Mckinley on 8/13/2018 at 12:19 PM    EMR Dragon/transcription disclaimer:  Much of this encounter note is electronic transcription/translation of spoken language to printed texts. The electronic translation of spoken language may be erroneous, or at times, nonsensical words or phrases may be inadvertently transcribed.   Although I have reviewed the note for such errors, some may still exist.

## 2018-08-13 NOTE — TELEPHONE ENCOUNTER
Patient came in with daughter and wife stating that his pain has gotten worse over the weekend. They asked specifically about Mobic. I explained that he should not be taking any kind of NSAID or ASA. They asked if a script from a previous Sx would be okay to take and they were pretty sure it was Norco. I told them that would be fine until we can send a Rx to manage pain until Sx on 8/20/18.

## 2018-08-17 NOTE — PROGRESS NOTES
Patient Care Team:  KANCHAN Steen as PCP - General (Nurse Practitioner Acute Care)  Michel Dye MD (Cardiology)  Lisset Schilling MD as Consulting Physician (Vascular Surgery)  Jacquelin Bradford DO as Consulting Physician (Neurosurgery)  KANCHAN Theodore as Nurse Practitioner (Family Nurse Practitioner)      History and Physical    Mr. Anton Greenberg has prior history of peripheral vascular disease of the lower extremities. Current medication include a statin daily. Rosy Mcgovern has not had new wounds. He report some right leg swelling. He  reports no claudication but has not been waling much due to his back pain. He is scheduled for back surgery on Friday with Dr. Mc Wright.      Eva Hartman is a 69 y.o. male with the following [facility]:  Patient Active Problem List    Diagnosis Date Noted    CAD (coronary artery disease)      Priority: High     01/12/06 CABG x 6  LIMA-LAD, sequential VG-RI & OM, VG-acute maginal, PDA and PLOM [place], [address])  4/22/2008  SE  negative for myocardial ischemia  1/9/2012  lexiscan negative for myocardial ischemia, EF 53%  04/22/2015  Symptomatic paroxymal VT (longest 17 beats at 202 bpm, on metroprolol) on  Holter  04/23/2015  Cath  Patent bypass conduits, normal LVFX, no AI, abdominal aortogram ok  04/24/2015  Single lead ICDts,  3/10/16  VT storm  5/5/2017  lexiscan Positive for inferior myocardial ischemia, EF 65%, intermediate risk findings, AUC indication 16, AUC score 7  5/15/17  Cath  Patent LIMA-LAD, patent VG-OM, patent VG-RCA, normal LVFX                   Vitamin D deficiency 08/13/2018    Groin abscess     Postprocedural wound infection 05/22/2018    Atherosclerosis of lower extremity with claudication ([address] 04/24/2018    Lumbar stenosis with neurogenic claudication 12/20/2017    Compression fracture of L4 lumbar vertebra (HCC) 09/22/2017    S/P kyphoplasty 09/22/2017    Abnormal nuclear cardiac imaging test     SOB (shortness of wound or IRP

## 2018-08-20 ENCOUNTER — HOSPITAL ENCOUNTER (OUTPATIENT)
Age: 78
Setting detail: OUTPATIENT SURGERY
Discharge: HOME | End: 2018-08-20
Attending: NEUROLOGICAL SURGERY | Admitting: NEUROLOGICAL SURGERY
Payer: MEDICARE

## 2018-08-20 ENCOUNTER — ANESTHESIA (OUTPATIENT)
Dept: OPERATING ROOM | Age: 78
End: 2018-08-20
Payer: MEDICARE

## 2018-08-20 ENCOUNTER — ANESTHESIA EVENT (OUTPATIENT)
Dept: OPERATING ROOM | Age: 78
End: 2018-08-20
Payer: MEDICARE

## 2018-08-20 ENCOUNTER — APPOINTMENT (OUTPATIENT)
Dept: GENERAL RADIOLOGY | Age: 78
End: 2018-08-20
Attending: NEUROLOGICAL SURGERY
Payer: MEDICARE

## 2018-08-20 VITALS
DIASTOLIC BLOOD PRESSURE: 59 MMHG | RESPIRATION RATE: 1 BRPM | TEMPERATURE: 98 F | OXYGEN SATURATION: 100 % | SYSTOLIC BLOOD PRESSURE: 141 MMHG

## 2018-08-20 VITALS
BODY MASS INDEX: 23.84 KG/M2 | HEIGHT: 72 IN | DIASTOLIC BLOOD PRESSURE: 66 MMHG | HEART RATE: 56 BPM | TEMPERATURE: 97.1 F | OXYGEN SATURATION: 93 % | RESPIRATION RATE: 14 BRPM | SYSTOLIC BLOOD PRESSURE: 153 MMHG | WEIGHT: 176 LBS

## 2018-08-20 DIAGNOSIS — G89.18 POST-OP PAIN: Primary | ICD-10-CM

## 2018-08-20 LAB
ABO/RH: NORMAL
ANTIBODY SCREEN: NORMAL

## 2018-08-20 PROCEDURE — 3209999900 FLUORO FOR SURGICAL PROCEDURES

## 2018-08-20 PROCEDURE — 2709999900 HC NON-CHARGEABLE SUPPLY: Performed by: NEUROLOGICAL SURGERY

## 2018-08-20 PROCEDURE — 2500000003 HC RX 250 WO HCPCS: Performed by: NURSE ANESTHETIST, CERTIFIED REGISTERED

## 2018-08-20 PROCEDURE — 86850 RBC ANTIBODY SCREEN: CPT

## 2018-08-20 PROCEDURE — 3600000015 HC SURGERY LEVEL 5 ADDTL 15MIN: Performed by: NEUROLOGICAL SURGERY

## 2018-08-20 PROCEDURE — 22514 PERQ VERTEBRAL AUGMENTATION: CPT | Performed by: NEUROLOGICAL SURGERY

## 2018-08-20 PROCEDURE — 2720000010 HC SURG SUPPLY STERILE: Performed by: NEUROLOGICAL SURGERY

## 2018-08-20 PROCEDURE — 6360000002 HC RX W HCPCS: Performed by: NURSE PRACTITIONER

## 2018-08-20 PROCEDURE — 3600000005 HC SURGERY LEVEL 5 BASE: Performed by: NEUROLOGICAL SURGERY

## 2018-08-20 PROCEDURE — 86901 BLOOD TYPING SEROLOGIC RH(D): CPT

## 2018-08-20 PROCEDURE — C1894 INTRO/SHEATH, NON-LASER: HCPCS | Performed by: NEUROLOGICAL SURGERY

## 2018-08-20 PROCEDURE — 86900 BLOOD TYPING SEROLOGIC ABO: CPT

## 2018-08-20 PROCEDURE — 7100000001 HC PACU RECOVERY - ADDTL 15 MIN: Performed by: NEUROLOGICAL SURGERY

## 2018-08-20 PROCEDURE — 6360000002 HC RX W HCPCS: Performed by: NURSE ANESTHETIST, CERTIFIED REGISTERED

## 2018-08-20 PROCEDURE — 7100000010 HC PHASE II RECOVERY - FIRST 15 MIN: Performed by: NEUROLOGICAL SURGERY

## 2018-08-20 PROCEDURE — C1713 ANCHOR/SCREW BN/BN,TIS/BN: HCPCS | Performed by: NEUROLOGICAL SURGERY

## 2018-08-20 PROCEDURE — 7100000011 HC PHASE II RECOVERY - ADDTL 15 MIN: Performed by: NEUROLOGICAL SURGERY

## 2018-08-20 PROCEDURE — 7100000000 HC PACU RECOVERY - FIRST 15 MIN: Performed by: NEUROLOGICAL SURGERY

## 2018-08-20 PROCEDURE — 3700000001 HC ADD 15 MINUTES (ANESTHESIA): Performed by: NEUROLOGICAL SURGERY

## 2018-08-20 PROCEDURE — 3700000000 HC ANESTHESIA ATTENDED CARE: Performed by: NEUROLOGICAL SURGERY

## 2018-08-20 DEVICE — CEMENT C01A KYPHX HV-R BONE CEMENT EN
Type: IMPLANTABLE DEVICE | Site: SPINE LUMBAR | Status: FUNCTIONAL
Brand: KYPHON® HV-R® BONE CEMENT

## 2018-08-20 DEVICE — BONE CEMENT C01B HV-R WITH MIXER  US
Type: IMPLANTABLE DEVICE | Site: SPINE LUMBAR | Status: FUNCTIONAL
Brand: KYPHON® HV-R® BONE CEMENT AND KYPHON® MIXER PACK

## 2018-08-20 RX ORDER — LIDOCAINE HYDROCHLORIDE 10 MG/ML
INJECTION, SOLUTION INFILTRATION; PERINEURAL PRN
Status: DISCONTINUED | OUTPATIENT
Start: 2018-08-20 | End: 2018-08-20 | Stop reason: SDUPTHER

## 2018-08-20 RX ORDER — LABETALOL HYDROCHLORIDE 5 MG/ML
5 INJECTION, SOLUTION INTRAVENOUS EVERY 10 MIN PRN
Status: DISCONTINUED | OUTPATIENT
Start: 2018-08-20 | End: 2018-08-20 | Stop reason: HOSPADM

## 2018-08-20 RX ORDER — MIDAZOLAM HYDROCHLORIDE 1 MG/ML
2 INJECTION INTRAMUSCULAR; INTRAVENOUS
Status: DISCONTINUED | OUTPATIENT
Start: 2018-08-20 | End: 2018-08-20 | Stop reason: HOSPADM

## 2018-08-20 RX ORDER — SODIUM CHLORIDE 0.9 % (FLUSH) 0.9 %
10 SYRINGE (ML) INJECTION PRN
Status: DISCONTINUED | OUTPATIENT
Start: 2018-08-20 | End: 2018-08-20 | Stop reason: HOSPADM

## 2018-08-20 RX ORDER — DEXAMETHASONE SODIUM PHOSPHATE 10 MG/ML
INJECTION INTRAMUSCULAR; INTRAVENOUS PRN
Status: DISCONTINUED | OUTPATIENT
Start: 2018-08-20 | End: 2018-08-20 | Stop reason: SDUPTHER

## 2018-08-20 RX ORDER — MIDAZOLAM HYDROCHLORIDE 1 MG/ML
INJECTION INTRAMUSCULAR; INTRAVENOUS PRN
Status: DISCONTINUED | OUTPATIENT
Start: 2018-08-20 | End: 2018-08-20 | Stop reason: SDUPTHER

## 2018-08-20 RX ORDER — DIPHENHYDRAMINE HYDROCHLORIDE 50 MG/ML
12.5 INJECTION INTRAMUSCULAR; INTRAVENOUS
Status: DISCONTINUED | OUTPATIENT
Start: 2018-08-20 | End: 2018-08-20 | Stop reason: HOSPADM

## 2018-08-20 RX ORDER — SODIUM CHLORIDE, SODIUM LACTATE, POTASSIUM CHLORIDE, CALCIUM CHLORIDE 600; 310; 30; 20 MG/100ML; MG/100ML; MG/100ML; MG/100ML
INJECTION, SOLUTION INTRAVENOUS CONTINUOUS
Status: DISCONTINUED | OUTPATIENT
Start: 2018-08-20 | End: 2018-08-20 | Stop reason: HOSPADM

## 2018-08-20 RX ORDER — LIDOCAINE HYDROCHLORIDE 10 MG/ML
1 INJECTION, SOLUTION EPIDURAL; INFILTRATION; INTRACAUDAL; PERINEURAL
Status: DISCONTINUED | OUTPATIENT
Start: 2018-08-20 | End: 2018-08-20 | Stop reason: HOSPADM

## 2018-08-20 RX ORDER — HYDROCODONE BITARTRATE AND ACETAMINOPHEN 5; 325 MG/1; MG/1
1 TABLET ORAL EVERY 4 HOURS PRN
Qty: 30 TABLET | Refills: 0 | Status: SHIPPED | OUTPATIENT
Start: 2018-08-20 | End: 2018-08-25

## 2018-08-20 RX ORDER — ENALAPRILAT 2.5 MG/2ML
1.25 INJECTION INTRAVENOUS
Status: DISCONTINUED | OUTPATIENT
Start: 2018-08-20 | End: 2018-08-20 | Stop reason: HOSPADM

## 2018-08-20 RX ORDER — FENTANYL CITRATE 50 UG/ML
25 INJECTION, SOLUTION INTRAMUSCULAR; INTRAVENOUS
Status: DISCONTINUED | OUTPATIENT
Start: 2018-08-20 | End: 2018-08-20 | Stop reason: HOSPADM

## 2018-08-20 RX ORDER — FENTANYL CITRATE 50 UG/ML
50 INJECTION, SOLUTION INTRAMUSCULAR; INTRAVENOUS
Status: DISCONTINUED | OUTPATIENT
Start: 2018-08-20 | End: 2018-08-20 | Stop reason: HOSPADM

## 2018-08-20 RX ORDER — ROCURONIUM BROMIDE 10 MG/ML
INJECTION, SOLUTION INTRAVENOUS PRN
Status: DISCONTINUED | OUTPATIENT
Start: 2018-08-20 | End: 2018-08-20 | Stop reason: SDUPTHER

## 2018-08-20 RX ORDER — HYDROMORPHONE HCL IN 0.9% NACL 0.5 MG/ML
0.25 SYRINGE (ML) INTRAVENOUS EVERY 5 MIN PRN
Status: DISCONTINUED | OUTPATIENT
Start: 2018-08-20 | End: 2018-08-20 | Stop reason: HOSPADM

## 2018-08-20 RX ORDER — MORPHINE SULFATE 1 MG/ML
2 INJECTION, SOLUTION EPIDURAL; INTRATHECAL; INTRAVENOUS EVERY 5 MIN PRN
Status: DISCONTINUED | OUTPATIENT
Start: 2018-08-20 | End: 2018-08-20 | Stop reason: HOSPADM

## 2018-08-20 RX ORDER — HYDRALAZINE HYDROCHLORIDE 20 MG/ML
5 INJECTION INTRAMUSCULAR; INTRAVENOUS EVERY 10 MIN PRN
Status: DISCONTINUED | OUTPATIENT
Start: 2018-08-20 | End: 2018-08-20 | Stop reason: HOSPADM

## 2018-08-20 RX ORDER — PROMETHAZINE HYDROCHLORIDE 25 MG/ML
6.25 INJECTION, SOLUTION INTRAMUSCULAR; INTRAVENOUS
Status: DISCONTINUED | OUTPATIENT
Start: 2018-08-20 | End: 2018-08-20 | Stop reason: HOSPADM

## 2018-08-20 RX ORDER — DOCUSATE SODIUM 100 MG/1
100 CAPSULE, LIQUID FILLED ORAL 2 TIMES DAILY PRN
Qty: 30 CAPSULE | Refills: 1 | Status: SHIPPED | OUTPATIENT
Start: 2018-08-20

## 2018-08-20 RX ORDER — MORPHINE SULFATE 1 MG/ML
4 INJECTION, SOLUTION EPIDURAL; INTRATHECAL; INTRAVENOUS EVERY 5 MIN PRN
Status: DISCONTINUED | OUTPATIENT
Start: 2018-08-20 | End: 2018-08-20 | Stop reason: HOSPADM

## 2018-08-20 RX ORDER — ONDANSETRON 4 MG/1
4 TABLET, FILM COATED ORAL EVERY 8 HOURS PRN
Qty: 30 TABLET | Refills: 1 | Status: SHIPPED | OUTPATIENT
Start: 2018-08-20

## 2018-08-20 RX ORDER — SODIUM CHLORIDE 0.9 % (FLUSH) 0.9 %
10 SYRINGE (ML) INJECTION EVERY 12 HOURS SCHEDULED
Status: DISCONTINUED | OUTPATIENT
Start: 2018-08-20 | End: 2018-08-20 | Stop reason: HOSPADM

## 2018-08-20 RX ORDER — HYDROMORPHONE HCL IN 0.9% NACL 0.5 MG/ML
0.5 SYRINGE (ML) INTRAVENOUS EVERY 5 MIN PRN
Status: DISCONTINUED | OUTPATIENT
Start: 2018-08-20 | End: 2018-08-20 | Stop reason: HOSPADM

## 2018-08-20 RX ORDER — FENTANYL CITRATE 50 UG/ML
INJECTION, SOLUTION INTRAMUSCULAR; INTRAVENOUS PRN
Status: DISCONTINUED | OUTPATIENT
Start: 2018-08-20 | End: 2018-08-20 | Stop reason: SDUPTHER

## 2018-08-20 RX ORDER — SODIUM CHLORIDE 9 MG/ML
INJECTION, SOLUTION INTRAVENOUS CONTINUOUS
Status: DISCONTINUED | OUTPATIENT
Start: 2018-08-20 | End: 2018-08-20 | Stop reason: HOSPADM

## 2018-08-20 RX ORDER — METOCLOPRAMIDE HYDROCHLORIDE 5 MG/ML
10 INJECTION INTRAMUSCULAR; INTRAVENOUS
Status: DISCONTINUED | OUTPATIENT
Start: 2018-08-20 | End: 2018-08-20 | Stop reason: HOSPADM

## 2018-08-20 RX ORDER — ONDANSETRON 2 MG/ML
INJECTION INTRAMUSCULAR; INTRAVENOUS PRN
Status: DISCONTINUED | OUTPATIENT
Start: 2018-08-20 | End: 2018-08-20 | Stop reason: SDUPTHER

## 2018-08-20 RX ORDER — PROPOFOL 10 MG/ML
INJECTION, EMULSION INTRAVENOUS PRN
Status: DISCONTINUED | OUTPATIENT
Start: 2018-08-20 | End: 2018-08-20 | Stop reason: SDUPTHER

## 2018-08-20 RX ADMIN — PROPOFOL 150 MG: 10 INJECTION, EMULSION INTRAVENOUS at 12:09

## 2018-08-20 RX ADMIN — FENTANYL CITRATE 50 MCG: 50 INJECTION INTRAMUSCULAR; INTRAVENOUS at 12:26

## 2018-08-20 RX ADMIN — Medication 2 MG: at 14:19

## 2018-08-20 RX ADMIN — ONDANSETRON HYDROCHLORIDE 4 MG: 2 INJECTION, SOLUTION INTRAMUSCULAR; INTRAVENOUS at 13:40

## 2018-08-20 RX ADMIN — HYDROCORTISONE SODIUM SUCCINATE 100 MG: 100 INJECTION, POWDER, FOR SOLUTION INTRAMUSCULAR; INTRAVENOUS at 12:41

## 2018-08-20 RX ADMIN — HYDROMORPHONE HYDROCHLORIDE 0.25 MG: 1 INJECTION, SOLUTION INTRAMUSCULAR; INTRAVENOUS; SUBCUTANEOUS at 13:46

## 2018-08-20 RX ADMIN — HYDROMORPHONE HYDROCHLORIDE 0.25 MG: 1 INJECTION, SOLUTION INTRAMUSCULAR; INTRAVENOUS; SUBCUTANEOUS at 13:35

## 2018-08-20 RX ADMIN — LABETALOL HYDROCHLORIDE 5 MG: 5 INJECTION, SOLUTION INTRAVENOUS at 14:11

## 2018-08-20 RX ADMIN — HYDROMORPHONE HYDROCHLORIDE 0.25 MG: 1 INJECTION, SOLUTION INTRAMUSCULAR; INTRAVENOUS; SUBCUTANEOUS at 14:00

## 2018-08-20 RX ADMIN — FENTANYL CITRATE 50 MCG: 50 INJECTION INTRAMUSCULAR; INTRAVENOUS at 12:08

## 2018-08-20 RX ADMIN — ROCURONIUM BROMIDE 50 MG: 10 INJECTION INTRAVENOUS at 12:09

## 2018-08-20 RX ADMIN — Medication 2 G: at 12:23

## 2018-08-20 RX ADMIN — DEXAMETHASONE SODIUM PHOSPHATE 10 MG: 10 INJECTION INTRAMUSCULAR; INTRAVENOUS at 12:27

## 2018-08-20 RX ADMIN — LIDOCAINE HYDROCHLORIDE 5 ML: 10 INJECTION, SOLUTION INFILTRATION; PERINEURAL at 12:09

## 2018-08-20 RX ADMIN — SUGAMMADEX 160 MG: 100 INJECTION, SOLUTION INTRAVENOUS at 13:44

## 2018-08-20 RX ADMIN — HYDROMORPHONE HYDROCHLORIDE 0.25 MG: 1 INJECTION, SOLUTION INTRAMUSCULAR; INTRAVENOUS; SUBCUTANEOUS at 13:56

## 2018-08-20 RX ADMIN — MIDAZOLAM 1 MG: 1 INJECTION INTRAMUSCULAR; INTRAVENOUS at 12:00

## 2018-08-20 RX ADMIN — DEXAMETHASONE SODIUM PHOSPHATE 10 MG: 10 INJECTION INTRAMUSCULAR; INTRAVENOUS at 13:27

## 2018-08-20 RX ADMIN — FENTANYL CITRATE 50 MCG: 50 INJECTION INTRAMUSCULAR; INTRAVENOUS at 12:50

## 2018-08-20 RX ADMIN — ROCURONIUM BROMIDE 20 MG: 10 INJECTION INTRAVENOUS at 12:34

## 2018-08-20 ASSESSMENT — PAIN DESCRIPTION - LOCATION
LOCATION: LEG
LOCATION: LEG

## 2018-08-20 ASSESSMENT — PAIN DESCRIPTION - PAIN TYPE
TYPE: CHRONIC PAIN
TYPE: CHRONIC PAIN

## 2018-08-20 ASSESSMENT — ENCOUNTER SYMPTOMS: SHORTNESS OF BREATH: 1

## 2018-08-20 ASSESSMENT — PAIN DESCRIPTION - FREQUENCY
FREQUENCY: CONTINUOUS
FREQUENCY: CONTINUOUS

## 2018-08-20 ASSESSMENT — PAIN DESCRIPTION - DESCRIPTORS
DESCRIPTORS: ACHING
DESCRIPTORS: ACHING

## 2018-08-20 ASSESSMENT — PAIN SCALES - GENERAL
PAINLEVEL_OUTOF10: 6
PAINLEVEL_OUTOF10: 0
PAINLEVEL_OUTOF10: 4
PAINLEVEL_OUTOF10: 0
PAINLEVEL_OUTOF10: 6
PAINLEVEL_OUTOF10: 7
PAINLEVEL_OUTOF10: 0
PAINLEVEL_OUTOF10: 3

## 2018-08-20 ASSESSMENT — LIFESTYLE VARIABLES: SMOKING_STATUS: 0

## 2018-08-20 ASSESSMENT — PAIN DESCRIPTION - ORIENTATION
ORIENTATION: RIGHT
ORIENTATION: RIGHT

## 2018-08-20 ASSESSMENT — PAIN - FUNCTIONAL ASSESSMENT: PAIN_FUNCTIONAL_ASSESSMENT: 0-10

## 2018-08-20 ASSESSMENT — COPD QUESTIONNAIRES: CAT_SEVERITY: SEVERE

## 2018-08-20 NOTE — ANESTHESIA POSTPROCEDURE EVALUATION
Department of Anesthesiology  Postprocedure Note    Patient: Anna Mathias  MRN: [id number]  YOB: 1940  Date of evaluation: 8/20/2018  Time:  2:04 PM     Procedure Summary     Date:  08/20/18 Room / Location:  [facility] OR 06 / [facility] OR    Anesthesia Start:  1202 Anesthesia Stop:      Procedure:  kyphoplasty of L5 (N/A Spine Lumbar) Diagnosis:  (L5 compression fracture)    Surgeon:  Jessy Smyth DO Responsible Provider:  Cyndee Babinski, APRN - CRNA    Anesthesia Type:  general ASA Status:  4          Anesthesia Type: general    Lorenzo Phase I:      Lorenzo Phase II:      Last vitals: Reviewed and per EMR flowsheets.        Anesthesia Post Evaluation    Patient location during evaluation: PACU  Patient participation: waiting for patient participation  Level of consciousness: sleepy but conscious  Pain score: 1  Airway patency: patent  Nausea & Vomiting: no nausea and no vomiting  Complications: no  Cardiovascular status: blood pressure returned to baseline and hypertensive  Respiratory status: acceptable, nasal cannula and spontaneous ventilation  Hydration status: stable

## 2018-08-20 NOTE — ANESTHESIA PRE PROCEDURE
Department of Anesthesiology  Preprocedure Note       Name:  Anup Du   Age:  69 y.o.  :  1940                                          MRN:  [id number]         Date:  2018      Surgeon: Levy Basilio):  Catracho Hernández DO    Procedure: Procedure(s):  kyphoplasty of L5 (N/A )    Medications prior to admission:   Prior to Admission medications    Medication Sig Start Date End Date Taking? Authorizing Provider   vitamin D (ERGOCALCIFEROL) 27401 units CAPS capsule Take 1 capsule by mouth once a week 18   KANCHAN Enamorado   Misc. Devices [facility]) MISC 1 each by Does not apply route daily 18   Ryan Brenner PA-C   HYDROcodone-acetaminophen (NORCO)  MG per tablet Take 1 tablet by mouth 2 times daily as needed for Pain for up to 7 days. . 18  KANCHAN Sinclair   dexamethasone (DECADRON) 6 MG tablet Take 6 mg by mouth 2 times daily (with meals)    Historical Provider, MD   meloxicam (MOBIC) 7.5 MG tablet Take 7.5 mg by mouth daily    Historical Provider, MD   DULoxetine (CYMBALTA) 60 MG extended release capsule Take 1 capsule by mouth daily 18   KANCHAN Enamorado   Pediatric Multivitamins-Fl (MULTI VIT/FL PO) Take by mouth    Historical Provider, MD   Fluticasone-Umeclidin-Vilant (TRELEGY ELLIPTA) 100-62.5-25 MCG/INH AEPB Inhale 1 puff into the lungs daily    Historical Provider, MD   OXYGEN Inhale 4 L into the lungs as needed    Historical Provider, MD   atorvastatin (LIPITOR) 40 MG tablet TAKE 1 TABLET DAILY 18   Elgin Ventura MD   bumetanide (BUMEX) 1 MG tablet Take 1 tablet by mouth 2 times daily  Patient taking differently: Take 1 mg by mouth daily  18   KANCHAN Enamorado   amiodarone (CORDARONE) 200 MG tablet Take 1 tablet by mouth daily 10/13/17   KANCHAN Burns   isosorbide mononitrate (IMDUR) 60 MG extended release tablet Take 1 tablet by mouth daily 10/13/17   KANCHAN BurnsOR-CON M20 20 MEQ extended release tablet Take 20 mEq by mouth daily  8/24/17   Historical Provider, MD   albuterol (PROVENTIL) (2.5 MG/3ML) 0.083% nebulizer solution Take 2.5 mg by nebulization every 6 hours as needed for Wheezing    Historical Provider, MD       Current medications:    Current Facility-Administered Medications   Medication Dose Route Frequency Provider Last Rate Last Dose    ceFAZolin (ANCEF) 2 g in 0.9% sodium chloride 50 mL IVPB  2 g Intravenous On Call to [address] [place] [address], KANCHAN        sodium chloride flush 0.9 % injection 10 mL  10 mL Intravenous 2 times per day KANCHAN Theodore        sodium chloride flush 0.9 % injection 10 mL  10 mL Intravenous PRN KANCHAN Theodore        lactated ringers infusion   Intravenous Continuous Jacquelin Bradford DO           Allergies:     Allergies   Allergen Reactions    Butamben-Tetracaine-Benzocaine Anaphylaxis    Ativan [Lorazepam] Other (See Comments)     \"makes him wild\"    Bactrim      Causes hallucinations    Biaxin [Clarithromycin]     Demerol     Levaquin [Levofloxacin] Other (See Comments)     \"can't take with heart medicine\"    Lorazepam Other (See Comments)     \"makes him wild\"    Sulfa Antibiotics Rash       Problem List:    Patient Active Problem List   Diagnosis Code    Hypercholesterolemia E78.00    Hypertension I10    COPD (chronic obstructive pulmonary disease) ([facility]) J44.9    Anxiety F41.9    Depression F32.9    CAD (coronary artery disease) I25.10    VT (ventricular tachycardia) ([facility]) I47.2    AICD (automatic cardioverter/defibrillator) present Z95.810    Ventricular tachyarrhythmia ([facility]) I47.2    NSVT (nonsustained ventricular tachycardia) ([facility]) I47.2    S/P CABG x 6 Z95.1    Sustained VT (ventricular tachycardia) ([facility]) I47.2    Atherosclerosis of native artery of both lower extremities with intermittent claudication ([facility]) I70.213    Chronic kidney disease (CKD), stage III (moderate) N18.3    Carotid artery stenosis I65.29    Syncope R55    SOB (shortness of breath) R06.02    Fatigue R53.83    Iron deficiency anemia D50.9    History of amiodarone therapy Z92.29    Abnormal nuclear cardiac imaging test R93.1    Compression fracture of L4 lumbar vertebra ([facility]) S32.040A    S/P kyphoplasty Z98.890    Lumbar stenosis with neurogenic claudication M48.062    Atherosclerosis of lower extremity with claudication ([facility]) I70.219    Postprocedural wound infection T81. 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 Candida Vang CAD (coronary artery disease)     a. History of open-heart surgery X6, 01/12/06, utilizing the left VANCE to the LAD, sequential vein graft to the intermeditate and obtuse marginal, individually reverse saphenous vein graft to the acute maginal of the PDA and PLOM. b. Stress echocardiogram 04/22/08, negative for myocardial ischemia.  Carotid artery stenosis     Chronic kidney disease     Chronic kidney disease (CKD), stage III (moderate)     COPD (chronic obstructive pulmonary disease) ([facility])     Depression     Gastric ulcer 11/06/2004    multiple, duodenal ulcer - Dr. Sawyer Sunshine History of blood transfusion     Hypercholesterolemia     Hypertension     Thyroid disease     Ulcer 09/08/2010    Rectum.  Ventricular tachyarrhythmia [facility])        Past Surgical History:        Procedure Laterality Date    BACK SURGERY  08/17/2017    CARDIAC CATHETERIZATION  4/22/15  JDT    EF 50%    CARDIAC DEFIBRILLATOR PLACEMENT  04/24/2015    AICD    CARDIAC SURGERY  1/06    Open Heart Surgery (x6)    CATARACT REMOVAL  05    COLON SURGERY  6/26/04    Colon reversal (10/18/2004); Colon re-reversal (10/26/2004).  HERNIA REPAIR  10/26/2006    Ileostomy reversal and hernia repair.     KNEE SURGERY  1996    LAMINEC/FACETECT/FORAMIN,CERVICAL 1 SEG N/A 12/20/2017

## 2018-08-20 NOTE — ADDENDUM NOTE
Addendum  created 08/20/18 1409 by KANCHAN Kimbrough - CRNA    Anesthesia Intra LDAs edited, LDA properties accepted

## 2018-08-20 NOTE — BRIEF OP NOTE
Brief Postoperative Note  ______________________________________________________________    Patient: Yann Nair  YOB: 1940  MRN: [id number]  Date of Procedure: 8/20/2018    Pre-Op Diagnosis: L5 compression fracture    Post-Op Diagnosis: Same       Procedure(s):  kyphoplasty of L5    Anesthesia: General    Surgeon(s):  Franco Campbell DO    Staff:  Scrub Person First: Val Kunz  Scrub Person Second: Susan Ballesteros     Estimated Blood Loss: Minimal  Complications: None    Specimens:   * No specimens in log *    Implants:    Implant Name Type Inv.  Item Serial No.  Lot No. LRB No. Used   KIT CEMENT BONE W/KYPHON MIXER TUBE FUNNL Cement KIT CEMENT BONE W/KYPHON MIXER TUBE [address] [id number] N/A 1   CEMENT [facility] SPINAL KT Spine CEMENT [address] [facility] [id number] N/A 1   KIT CEMENT BONE W/KYPHON MIXER TUBE [facility] Cement KIT CEMENT BONE W/KYPHON MIXER TUBE [address] [id number] N/A 1   CEMENT [facility] SPINAL KT Spine CEMENT [address] [facility] [id number] N/A 1         Drains:   Negative Pressure Wound Therapy Groin Right (Active)       Findings: Good fill of L5     Franco Campbell DO  Date: 8/20/2018  Time: 1:42 PM

## 2018-08-20 NOTE — OP NOTE
NEUROSURGICAL DEPARTMENT REPORT       DATE OF SERVICE: 8/20/2018     PREOPERATIVE DIAGNOSES     Compression fracture L5     POSTOPERATIVE DIAGNOSES    Same     OPERATIVE PROCEDURES  Percutaneous vertebral augmentation (kyphoplasty) L5     SURGEON  Irving Redding,      FIRST ASSIST    None     DESCRIPTION OF PROCEDURE    The patient was brought to the operating room where sedation was introduced. The patient was positioned on the open table in the prone position. All pressure points were carefully checked and padded. The back was clipped and prepared for 10 minutes with Betadine scrub, Betadine paint and DuraPrep. The patient was draped in the usual sterile fashion. The C-arm fluoroscope was draped. After the patient's skin was prepped and draped, the L5 level was localized fluoroscopically and marked. Local was injected. A 15 blade was used to create a small stab incision. A pedicle accessing needle was inserted into the stab incision and using AP and lateral fluoroscopy navigated through the pedicle into the vertebral body. This process was repeated again on the contralateral side. The balloon device was tested and then subsequently inserted through both needles. Under x-ray guidance the balloons were confirmed to be in good condition and subsequently inflated. They were then removed. 8 cc of polymethmethacrylate (PMMA) was inserted into the vetebral body. Once a satisfactory amount of PMMA was placed it was packed and the needles were removed. The wound was closed in layered fashion. A sterile dressing was applied. The patient was returned to the stretcher in the supine position and then returned recovery room in a stable condition.

## 2018-08-23 ENCOUNTER — TELEPHONE (OUTPATIENT)
Dept: INTERNAL MEDICINE | Age: 78
End: 2018-08-23

## 2018-08-27 ENCOUNTER — TELEPHONE (OUTPATIENT)
Dept: NEUROSURGERY | Age: 78
End: 2018-08-27

## 2018-08-27 ENCOUNTER — OFFICE VISIT (OUTPATIENT)
Dept: NEUROSURGERY | Age: 78
End: 2018-08-27

## 2018-08-27 ENCOUNTER — HOSPITAL ENCOUNTER (OUTPATIENT)
Dept: GENERAL RADIOLOGY | Age: 78
Discharge: HOME | End: 2018-08-27
Payer: MEDICARE

## 2018-08-27 VITALS
HEART RATE: 75 BPM | SYSTOLIC BLOOD PRESSURE: 121 MMHG | BODY MASS INDEX: 24.38 KG/M2 | HEIGHT: 72 IN | OXYGEN SATURATION: 91 % | WEIGHT: 180 LBS | DIASTOLIC BLOOD PRESSURE: 63 MMHG

## 2018-08-27 DIAGNOSIS — Z48.89 ENCOUNTER FOR POST SURGICAL WOUND CHECK: Primary | ICD-10-CM

## 2018-08-27 DIAGNOSIS — Z98.890 S/P KYPHOPLASTY: ICD-10-CM

## 2018-08-27 DIAGNOSIS — R29.898 WEAKNESS OF RIGHT LOWER EXTREMITY: ICD-10-CM

## 2018-08-27 DIAGNOSIS — M79.652 LEFT THIGH PAIN: ICD-10-CM

## 2018-08-27 PROCEDURE — 99024 POSTOP FOLLOW-UP VISIT: CPT | Performed by: NURSE PRACTITIONER

## 2018-08-27 PROCEDURE — 72100 X-RAY EXAM L-S SPINE 2/3 VWS: CPT

## 2018-08-27 ASSESSMENT — ENCOUNTER SYMPTOMS
BACK PAIN: 1
RESPIRATORY NEGATIVE: 1
SHORTNESS OF BREATH: 1
ORTHOPNEA: 0
BACK PAIN: 0
EYES NEGATIVE: 1
COUGH: 0
GASTROINTESTINAL NEGATIVE: 1
SPUTUM PRODUCTION: 0
WHEEZING: 0
HEMOPTYSIS: 0

## 2018-08-27 NOTE — PROGRESS NOTES
[facility] Office Visit    Patient:   Ileana Hahn  MR#:    [id number]      YOB: 1940  Date of Evaluation:  8/27/2018  Time of Note:                          2:16 PM  Primary/Referring Physician:  KANCHAN James   Note Author:   KANCHAN Vasquez        Chief Complaint   Patient presents with    Post-Op Check     8/20/18 - L5 Kyphoplasty    Other     right leg weakness       HISTORY OF PRESENT ILLNESS:       Ileana Hahn is a 69 y.o. male who underwent a kyphoplasty of L5 for a compression fracture on 8/20/2018 and is 1 week out. He has also undergone a L3-4 decompressive laminectomy using minimally invasive technique for spinal stenosis of lumbar region on 12/20/2017 and now he is 8 months out from that surgery. Prior to surgery he complained of low back pain at the belt line. The pain did radiate into the left leg intermittently. He denied numbness. He could not stand for very long periods of time, he had frequent falls. Today he reports that he had one good day after surgery. He states that he was up and walking with a walker without much pain and then he fell on Thursday of last week and has had excruciating left thigh pain, has not been able to move his RLE very well, and has continued to have severe BLE swelling. He states that he is just fatigued all over. He states that he did not go to the ED after the fall, however, he went a few days later due to the swelling in his legs. They have instructed him to wrap them and increase his PO diuretics. He has not spoken to his vascular provider. 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 Felicita Quiroz CAD (coronary artery disease)     a. History of open-heart surgery X6, 01/12/06, utilizing the left VANCE to the LAD, sequential vein graft to the intermeditate and RETINAL DETACHMENT SURGERY  05/30/2005    RETINAL DETACHMENT SURGERY  06/21/2005    VASCULAR SURGERY  06/05/2017    [facility]. Left CFA 5f-6-f destination,aortogram with bilateral lower arteriogram,right SFA/Pop atherectomy CSI 1.25 solid,right SFA /pop balloon balloon angioplasty 6x150 mynx left CFA.  VASCULAR SURGERY Right 04/24/18 [facility]    Rigth common femoral/superficial femoral artery endarterectomy with right GSV patch, Left CFA 5F-6F sheath, RLE Angiogram, Right SFA/Popliteal balloon atherectomy with CSI 2.0 solid crown, Right Popliteal balloon angoplasty 5x150 lutonix, Right SFA Balloon Angioplasty 6x100 lutonix,    VASCULAR SURGERY  04/24/18 [facility]    1. Right common femoral/superficial femoral artery endarterectomy with (R) GSV Patch. 2. Left CFA 5f-6f sheath. 3. RLE A'gram. 4. (R) SFA/popliteal atherectomy with CSI 2.0 solid crown. 5.(R) popliteal balloon angioplasty 5x150 lutonix 6. (R)SFA Balloon a'plasty 6x250, 6x100 LUTONIX 7. Completion RLE A'gram 8. (L) CFA MYNX        Medications    Current Outpatient Prescriptions:     docusate sodium (COLACE) 100 MG capsule, Take 1 capsule by mouth 2 times daily as needed for Constipation, Disp: 30 capsule, Rfl: 1    ondansetron (ZOFRAN) 4 MG tablet, Take 1 tablet by mouth every 8 hours as needed for Nausea or Vomiting, Disp: 30 tablet, Rfl: 1    vitamin D (ERGOCALCIFEROL) 86372 units CAPS capsule, Take 1 capsule by mouth once a week, Disp: 30 capsule, Rfl: 3    Misc.  Devices [facility]) MISC, 1 each by Does not apply route daily, Disp: 1 each, Rfl: 0    dexamethasone (DECADRON) 6 MG tablet, Take 6 mg by mouth 2 times daily (with meals), Disp: , Rfl:     meloxicam (MOBIC) 7.5 MG tablet, Take 7.5 mg by mouth daily, Disp: , Rfl:     DULoxetine (CYMBALTA) 60 MG extended release capsule, Take 1 capsule by mouth daily, Disp: 90 capsule, Rfl: 1    Pediatric Multivitamins-Fl (MULTI VIT/FL PO), Take by mouth, Disp: , Rfl:     Fluticasone-Umeclidin-Vilant (Chelsea Primes ELLIPTA) 100-62.5-25 MCG/INH AEPB, Inhale 1 puff into the lungs daily, Disp: , Rfl:     OXYGEN, Inhale 4 L into the lungs as needed, Disp: , Rfl:     atorvastatin (LIPITOR) 40 MG tablet, TAKE 1 TABLET DAILY, Disp: 90 tablet, Rfl: 3    bumetanide (BUMEX) 1 MG tablet, Take 1 tablet by mouth 2 times daily (Patient taking differently: Take 1 mg by mouth daily ), Disp: 180 tablet, Rfl: 1    amiodarone (CORDARONE) 200 MG tablet, Take 1 tablet by mouth daily, Disp: 90 tablet, Rfl: 3    isosorbide mononitrate (IMDUR) 60 MG extended release tablet, Take 1 tablet by mouth daily, Disp: 90 tablet, Rfl: 3    KLOR-CON M20 20 MEQ extended release tablet, Take 20 mEq by mouth daily , Disp: , Rfl:     albuterol (PROVENTIL) (2.5 MG/3ML) 0.083% nebulizer solution, Take 2.5 mg by nebulization every 6 hours as needed for Wheezing, Disp: , Rfl:   Butamben-tetracaine-benzocaine; Ativan [lorazepam]; Bactrim; Biaxin [clarithromycin]; Demerol; Levaquin [levofloxacin]; Lorazepam; and Sulfa antibiotics    Social History  History   Smoking Status    Former Smoker    Packs/day: 1.50    Years: 50.00    Types: Cigarettes    Quit date: 4/17/2015   Smokeless Tobacco    Never Used     History   Alcohol Use    0.6 oz/week    1 Cans of beer per week     Comment: weekly         Family History   Problem Relation Age of Onset    Hypertension Mother     Diabetes Father     Dementia Father     Cancer Father     Colon Cancer Father     Hypertension Father     Cancer Brother     Esophageal Cancer Sister        Review of Systems   Constitutional: Negative for chills, diaphoresis, fever, malaise/fatigue and weight loss. HENT: Negative. Eyes: Negative. Respiratory: Positive for shortness of breath. Negative for cough, hemoptysis, sputum production and wheezing. Cardiovascular: Negative. Gastrointestinal: Negative. Genitourinary: Negative.          Not getting the \"done\" feeling   Musculoskeletal: Positive for back pain and Component Value Date    INR 1.20 (H) 05/23/2018    INR 1.10 04/17/2018    INR 1.05 12/12/2017    PROTIME 15.1 (H) 05/23/2018    PROTIME 14.1 04/17/2018    PROTIME 13.6 12/12/2017    No results found. Narrative   Examination. XR THORACIC SPINE (3 VIEWS)   History: Interscapular pain. The frontal and lateral views of the thoracic spine including the   lateral view of the cervicothoracic junction is obtained. There is no   previous study for comparison. There is no evidence of recent fracture or compression. There is a mild dextroscoliosis. The curve and alignment are normal. The vertebral body heights are   normal.   Chronic degenerative changes are seen in the form of osteophytes. The   posterior processes and pedicles are intact. An AICD seen in place.       Impression   No acute bony abnormality. Chronic degenerative changes of the thoracic spine. Signed by Dr Clark Anna on 8/6/2018 3:22 PM     Narrative   XR LUMBAR SPINE (2-3 VIEWS)    8/27/2018 1:16 PM   History: Lumbar fracture. Kyphoplasty treatment. Lumbar spine series 3 views. COMPARISON: 08/06/2018. No scoliosis. Multiple surgical clips project over the abdomen. Cardiac pacer lead noted. Diffuse osteopenia. High-grade anterior wedge compression fracture of L4 with kyphoplasty   treatment. 30-40% L5 compression fracture with interval kyphoplasty treatment. T12-L3 are intact. There is mild disc space narrowing and moderate endplate spurring at   V1-5. Diffuse calcification of abdominal aorta.       Impression   1. High-grade anterior wedge compression of L4 with kyphoplasty   treatment is unchanged. 2. Moderate compression fracture of L5 with interval kyphoplasty   treatment. Normal postoperative appearance.    Signed by Dr Sukhdev Vaughn on 8/27/2018 1:19 PM     I have personally reviewed the images and my interpretation is:  Stable, no new fractures identified         ASSESSMENT:     Mitali Singh is a

## 2018-08-31 ENCOUNTER — TELEPHONE (OUTPATIENT)
Dept: NEUROSURGERY | Age: 78
End: 2018-08-31

## 2018-08-31 DIAGNOSIS — M54.50 SEVERE LOW BACK PAIN: ICD-10-CM

## 2018-08-31 DIAGNOSIS — R26.2 DIFFICULTY WALKING: ICD-10-CM

## 2018-08-31 DIAGNOSIS — M79.652 LEFT THIGH PAIN: ICD-10-CM

## 2018-08-31 DIAGNOSIS — R29.898 WEAKNESS OF RIGHT LOWER EXTREMITY: Primary | ICD-10-CM

## 2018-08-31 DIAGNOSIS — R29.898 BILATERAL LEG WEAKNESS: ICD-10-CM

## 2018-08-31 NOTE — TELEPHONE ENCOUNTER
----- Message from KANCHAN Julien sent at 8/30/2018  5:00 PM CDT -----  To be honest, I really don't want to see him back here until we have an opinion on his lower extremities from vascular. Once he has seen vascular and they have completely ruled out that his leg pains are not from that, then we may consider repeat imaging.        ----- Message -----  From: Jim Russell  Sent: 8/30/2018  12:31 PM  To: KANCHAN Julien called wanting to do more testing on patient's back. Patient has had a decompressive surgery and a kyphoplasty. She thinks that his sciatic nerve is pinched and that is what is causing his hip pain.  He is a follow up prn because his XRs were normal.

## 2018-09-07 ENCOUNTER — HOSPITAL ENCOUNTER (OUTPATIENT)
Dept: GENERAL RADIOLOGY | Age: 78
Discharge: HOME | End: 2018-09-07
Payer: MEDICARE

## 2018-09-07 ENCOUNTER — HOSPITAL ENCOUNTER (OUTPATIENT)
Dept: CT IMAGING | Age: 78
Discharge: HOME | End: 2018-09-07
Payer: MEDICARE

## 2018-09-07 VITALS
WEIGHT: 180 LBS | RESPIRATION RATE: 18 BRPM | DIASTOLIC BLOOD PRESSURE: 73 MMHG | HEART RATE: 77 BPM | TEMPERATURE: 98.7 F | OXYGEN SATURATION: 100 % | SYSTOLIC BLOOD PRESSURE: 152 MMHG | HEIGHT: 72 IN | BODY MASS INDEX: 24.38 KG/M2

## 2018-09-07 DIAGNOSIS — M54.5 LOW BACK PAIN, UNSPECIFIED BACK PAIN LATERALITY, UNSPECIFIED CHRONICITY, WITH SCIATICA PRESENCE UNSPECIFIED: ICD-10-CM

## 2018-09-07 DIAGNOSIS — S32.050A CLOSED COMPRESSION FRACTURE OF FIFTH LUMBAR VERTEBRA, INITIAL ENCOUNTER: ICD-10-CM

## 2018-09-07 DIAGNOSIS — R26.2 DIFFICULTY WALKING: ICD-10-CM

## 2018-09-07 DIAGNOSIS — R29.898 WEAKNESS OF RIGHT LOWER EXTREMITY: ICD-10-CM

## 2018-09-07 DIAGNOSIS — M54.50 SEVERE LOW BACK PAIN: ICD-10-CM

## 2018-09-07 DIAGNOSIS — R29.898 BILATERAL LEG WEAKNESS: ICD-10-CM

## 2018-09-07 DIAGNOSIS — M79.652 LEFT THIGH PAIN: ICD-10-CM

## 2018-09-07 LAB
INR BLD: 1.07 (ref 0.88–1.18)
PLATELET # BLD: 219 K/UL (ref 130–400)
POTASSIUM SERPL-SCNC: 4.1 MMOL/L (ref 3.5–5)
PROTHROMBIN TIME: 13.8 SEC (ref 12–14.6)

## 2018-09-07 PROCEDURE — 85049 AUTOMATED PLATELET COUNT: CPT

## 2018-09-07 PROCEDURE — 71046 X-RAY EXAM CHEST 2 VIEWS: CPT

## 2018-09-07 PROCEDURE — 72131 CT LUMBAR SPINE W/O DYE: CPT

## 2018-09-07 PROCEDURE — 6360000004 HC RX CONTRAST MEDICATION: Performed by: NURSE PRACTITIONER

## 2018-09-07 PROCEDURE — 72265 MYELOGRAPHY L-S SPINE: CPT

## 2018-09-07 PROCEDURE — 72100 X-RAY EXAM L-S SPINE 2/3 VWS: CPT

## 2018-09-07 PROCEDURE — 84132 ASSAY OF SERUM POTASSIUM: CPT

## 2018-09-07 PROCEDURE — 85610 PROTHROMBIN TIME: CPT

## 2018-09-07 RX ADMIN — IOPAMIDOL 10 ML: 408 INJECTION, SOLUTION INTRATHECAL at 12:00

## 2018-09-07 ASSESSMENT — PAIN - FUNCTIONAL ASSESSMENT: PAIN_FUNCTIONAL_ASSESSMENT: 0-10

## 2018-09-07 ASSESSMENT — PAIN SCALES - GENERAL: PAINLEVEL_OUTOF10: 4

## 2018-09-07 NOTE — FLOWSHEET NOTE
Pt has recovered well, no distress, cms intact, site is clear. Pt has had his xrays and myelogram. Pt has eaten lunch and is ready for discharge. DC instructions reviewed with family and patient, questions answered. Escorted pt and family to vehicle and dc home.

## 2018-09-10 ENCOUNTER — TELEPHONE (OUTPATIENT)
Dept: NEUROSURGERY | Age: 78
End: 2018-09-10

## 2018-09-10 NOTE — TELEPHONE ENCOUNTER
Spoke with Danita Campos and made an appointment for patient to see Dr. Jordan Morfin to review CT myelogram.

## 2018-09-10 NOTE — TELEPHONE ENCOUNTER
He does have some mild to moderate issues, however, I do not think Dr. Nguyen Olson will want to jump in and perform an additional surgery.   That being said, I think he should definitely come in for a follow up appointment with Dr. Nguyen Olson to officially review the CT myelogram.

## 2018-09-23 ENCOUNTER — LAB REQUISITION (OUTPATIENT)
Dept: LAB | Facility: HOSPITAL | Age: 78
End: 2018-09-23

## 2018-09-23 DIAGNOSIS — Z00.00 ENCOUNTER FOR GENERAL ADULT MEDICAL EXAMINATION WITHOUT ABNORMAL FINDINGS: ICD-10-CM

## 2018-09-23 LAB
GENTAMICIN SERPL-MCNC: 9.29 MCG/ML (ref 5–8)
GENTAMICIN SERPL-MCNC: <0.6 MCG/ML (ref 0–2)

## 2018-09-23 PROCEDURE — 80170 ASSAY OF GENTAMICIN: CPT | Performed by: NURSE PRACTITIONER

## 2018-09-24 ENCOUNTER — LAB REQUISITION (OUTPATIENT)
Dept: LAB | Facility: HOSPITAL | Age: 78
End: 2018-09-24

## 2018-09-24 DIAGNOSIS — Z00.00 ENCOUNTER FOR GENERAL ADULT MEDICAL EXAMINATION WITHOUT ABNORMAL FINDINGS: ICD-10-CM

## 2018-09-24 LAB
ALBUMIN SERPL-MCNC: 2.6 G/DL (ref 3.5–5)
ALBUMIN/GLOB SERPL: 1 G/DL (ref 1.1–2.5)
ALP SERPL-CCNC: 156 U/L (ref 24–120)
ALT SERPL W P-5'-P-CCNC: 60 U/L (ref 0–54)
ANION GAP SERPL CALCULATED.3IONS-SCNC: 8 MMOL/L (ref 4–13)
AST SERPL-CCNC: 38 U/L (ref 7–45)
BILIRUB SERPL-MCNC: 0.3 MG/DL (ref 0.1–1)
BUN BLD-MCNC: 36 MG/DL (ref 5–21)
BUN/CREAT SERPL: 25.9 (ref 7–25)
CALCIUM SPEC-SCNC: 8.1 MG/DL (ref 8.4–10.4)
CHLORIDE SERPL-SCNC: 94 MMOL/L (ref 98–110)
CO2 SERPL-SCNC: 33 MMOL/L (ref 24–31)
CREAT BLD-MCNC: 1.39 MG/DL (ref 0.5–1.4)
DEPRECATED RDW RBC AUTO: 52.6 FL (ref 40–54)
ERYTHROCYTE [DISTWIDTH] IN BLOOD BY AUTOMATED COUNT: 17 % (ref 12–15)
GFR SERPL CREATININE-BSD FRML MDRD: 50 ML/MIN/1.73
GLOBULIN UR ELPH-MCNC: 2.5 GM/DL
GLUCOSE BLD-MCNC: 153 MG/DL (ref 70–100)
HCT VFR BLD AUTO: 24.5 % (ref 40–52)
HGB BLD-MCNC: 8.1 G/DL (ref 14–18)
HYPOCHROMIA BLD QL: ABNORMAL
LYMPHOCYTES # BLD MANUAL: 1.4 10*3/MM3 (ref 0.72–4.86)
LYMPHOCYTES NFR BLD MANUAL: 2.1 % (ref 4–12)
LYMPHOCYTES NFR BLD MANUAL: 9.3 % (ref 15–45)
MCH RBC QN AUTO: 28.3 PG (ref 28–32)
MCHC RBC AUTO-ENTMCNC: 33.1 G/DL (ref 33–36)
MCV RBC AUTO: 85.7 FL (ref 82–95)
METAMYELOCYTES NFR BLD MANUAL: 2.1 % (ref 0–0)
MONOCYTES # BLD AUTO: 0.32 10*3/MM3 (ref 0.19–1.3)
MYELOCYTES NFR BLD MANUAL: 3.1 % (ref 0–0)
NEUTROPHILS # BLD AUTO: 12.42 10*3/MM3 (ref 1.87–8.4)
NEUTROPHILS NFR BLD MANUAL: 81.4 % (ref 39–78)
NEUTS BAND NFR BLD MANUAL: 1 % (ref 0–10)
PLAT MORPH BLD: NORMAL
PLATELET # BLD AUTO: 227 10*3/MM3 (ref 130–400)
PMV BLD AUTO: 10.5 FL (ref 6–12)
POTASSIUM BLD-SCNC: 3.5 MMOL/L (ref 3.5–5.3)
PROT SERPL-MCNC: 5.1 G/DL (ref 6.3–8.7)
RBC # BLD AUTO: 2.86 10*6/MM3 (ref 4.8–5.9)
SODIUM BLD-SCNC: 135 MMOL/L (ref 135–145)
VARIANT LYMPHS NFR BLD MANUAL: 1 % (ref 0–5)
WBC MORPH BLD: NORMAL
WBC NRBC COR # BLD: 15.06 10*3/MM3 (ref 4.8–10.8)

## 2018-09-24 PROCEDURE — 80053 COMPREHEN METABOLIC PANEL: CPT | Performed by: NURSE PRACTITIONER

## 2018-09-24 PROCEDURE — 85007 BL SMEAR W/DIFF WBC COUNT: CPT | Performed by: NURSE PRACTITIONER

## 2018-09-24 PROCEDURE — 36415 COLL VENOUS BLD VENIPUNCTURE: CPT | Performed by: NURSE PRACTITIONER

## 2018-09-24 PROCEDURE — 85025 COMPLETE CBC W/AUTO DIFF WBC: CPT | Performed by: NURSE PRACTITIONER

## 2018-09-25 ENCOUNTER — OFFICE VISIT (OUTPATIENT)
Dept: NEUROSURGERY | Age: 78
End: 2018-09-25
Payer: MEDICARE

## 2018-09-25 VITALS
HEART RATE: 76 BPM | HEIGHT: 72 IN | SYSTOLIC BLOOD PRESSURE: 170 MMHG | OXYGEN SATURATION: 95 % | BODY MASS INDEX: 23.57 KG/M2 | DIASTOLIC BLOOD PRESSURE: 77 MMHG | WEIGHT: 174 LBS

## 2018-09-25 DIAGNOSIS — R29.898 BILATERAL LEG WEAKNESS: ICD-10-CM

## 2018-09-25 DIAGNOSIS — M79.652 LEFT THIGH PAIN: ICD-10-CM

## 2018-09-25 DIAGNOSIS — R26.2 DIFFICULTY WALKING: ICD-10-CM

## 2018-09-25 DIAGNOSIS — R29.898 WEAKNESS OF RIGHT LOWER EXTREMITY: Primary | ICD-10-CM

## 2018-09-25 DIAGNOSIS — Z98.890 S/P KYPHOPLASTY: ICD-10-CM

## 2018-09-25 PROCEDURE — 99213 OFFICE O/P EST LOW 20 MIN: CPT | Performed by: NURSE PRACTITIONER

## 2018-09-25 ASSESSMENT — ENCOUNTER SYMPTOMS
GASTROINTESTINAL NEGATIVE: 1
SHORTNESS OF BREATH: 1
BACK PAIN: 1
SPUTUM PRODUCTION: 0
COUGH: 0
HEMOPTYSIS: 0
WHEEZING: 0
EYES NEGATIVE: 1

## 2018-09-25 NOTE — PROGRESS NOTES
intermeditate and obtuse marginal, individually reverse saphenous vein graft to the acute maginal of the PDA and PLOM. b. Stress echocardiogram 04/22/08, negative for myocardial ischemia.  Carotid artery stenosis     Chronic kidney disease     Chronic kidney disease (CKD), stage III (moderate)     COPD (chronic obstructive pulmonary disease) (HCC)     Depression     Gastric ulcer 11/06/2004    multiple, duodenal ulcer - Dr. Gerda Kelley History of blood transfusion     Hypercholesterolemia     Hypertension     Thyroid disease     Ulcer 09/08/2010    Rectum.  Ventricular tachyarrhythmia [facility])        Past Surgical History:   Procedure Laterality Date    BACK SURGERY  08/17/2017    CARDIAC CATHETERIZATION  4/22/15  JDT    EF 50%    CARDIAC DEFIBRILLATOR PLACEMENT  04/24/2015    AICD    CARDIAC SURGERY  1/06    Open Heart Surgery (x6)    CATARACT REMOVAL  05    COLON SURGERY  6/26/04    Colon reversal (10/18/2004); Colon re-reversal (10/26/2004).  HERNIA REPAIR  10/26/2006    Ileostomy reversal and hernia repair.  KNEE SURGERY  1996    LAMINEC/FACETECT/FORAMIN,CERVICAL 1 SEG N/A 12/20/2017    L3-4 decompressive laminectomy using minimally invasive technique performed by Jacquelin Bradford DO at [address]  11/2004    Bleeding ulcers.  OTHER SURGICAL HISTORY  02/14/2007    Debridement of abdominal/with pulse irrigation.     [place] OFFICE/OUTPT VISIT,PROCEDURE ONLY N/A 8/20/2018    kyphoplasty of L5 performed by Jacquelin Bradford DO at [facility] 11 BL VES DIRECT,LOW EXTREM Right 5/23/2018    RIGHT GROIN, DEBRIDEMENT AND MUSCLE FLAP  performed by Lisset Schilling MD at [address] IPSI VSL Right 4/24/2018    LOWER EXTREMITY ANGIOGRAM, BALLOON ANGIOPLASTY, ATHERECTOMY performed by Lisset Schilling MD at [address] Right 4/24/2018    COMMON FEMORAL ARTERY ENDARTERECTOMY performed by Lisset Schilling, MD at [address] [facility] SURGERY  05/30/2005    RETINAL DETACHMENT SURGERY  06/21/2005    VASCULAR SURGERY  06/05/2017    [facility]. Left CFA 5f-6-f destination,aortogram with bilateral lower arteriogram,right SFA/Pop atherectomy CSI 1.25 solid,right SFA /pop balloon balloon angioplasty 6x150 mynx left CFA.  VASCULAR SURGERY Right 04/24/18 SJS    Rigth common femoral/superficial femoral artery endarterectomy with right GSV patch, Left CFA 5F-6F sheath, RLE Angiogram, Right SFA/Popliteal balloon atherectomy with CSI 2.0 solid crown, Right Popliteal balloon angoplasty 5x150 lutonix, Right SFA Balloon Angioplasty 6x100 lutonix,    VASCULAR SURGERY  04/24/18 SJS    1. Right common femoral/superficial femoral artery endarterectomy with (R) GSV Patch. 2. Left CFA 5f-6f sheath. 3. RLE A'gram. 4. (R) SFA/popliteal atherectomy with CSI 2.0 solid crown. 5.(R) popliteal balloon angioplasty 5x150 lutonix 6. (R)SFA Balloon a'plasty 6x250, 6x100 LUTONIX 7. Completion RLE A'gram 8. (L) CFA MYNX        Medications    Current Outpatient Prescriptions:     docusate sodium (COLACE) 100 MG capsule, Take 1 capsule by mouth 2 times daily as needed for Constipation, Disp: 30 capsule, Rfl: 1    ondansetron (ZOFRAN) 4 MG tablet, Take 1 tablet by mouth every 8 hours as needed for Nausea or Vomiting, Disp: 30 tablet, Rfl: 1    vitamin D (ERGOCALCIFEROL) 72882 units CAPS capsule, Take 1 capsule by mouth once a week, Disp: 30 capsule, Rfl: 3    Misc.  Devices [facility]) MISC, 1 each by Does not apply route daily, Disp: 1 each, Rfl: 0    dexamethasone (DECADRON) 6 MG tablet, Take 6 mg by mouth 2 times daily (with meals), Disp: , Rfl:     meloxicam (MOBIC) 7.5 MG tablet, Take 7.5 mg by mouth daily, Disp: , Rfl:     DULoxetine (CYMBALTA) 60 MG extended release capsule, Take 1 capsule by mouth daily, Disp: 90 capsule, Rfl: 1    Pediatric Multivitamins-Fl (MULTI VIT/FL PO), Take by mouth, Disp: , Rfl:    Fluticasone-Umeclidin-Vilant (TRELEGY ELLIPTA) 100-62.5-25 MCG/INH AEPB, Inhale 1 puff into the lungs daily, Disp: , Rfl:     OXYGEN, Inhale 4 L into the lungs as needed, Disp: , Rfl:     atorvastatin (LIPITOR) 40 MG tablet, TAKE 1 TABLET DAILY, Disp: 90 tablet, Rfl: 3    bumetanide (BUMEX) 1 MG tablet, Take 1 tablet by mouth 2 times daily (Patient taking differently: Take 1 mg by mouth daily ), Disp: 180 tablet, Rfl: 1    amiodarone (CORDARONE) 200 MG tablet, Take 1 tablet by mouth daily, Disp: 90 tablet, Rfl: 3    isosorbide mononitrate (IMDUR) 60 MG extended release tablet, Take 1 tablet by mouth daily, Disp: 90 tablet, Rfl: 3    KLOR-CON M20 20 MEQ extended release tablet, Take 20 mEq by mouth daily , Disp: , Rfl:     albuterol (PROVENTIL) (2.5 MG/3ML) 0.083% nebulizer solution, Take 2.5 mg by nebulization every 6 hours as needed for Wheezing, Disp: , Rfl:   Butamben-tetracaine-benzocaine; Ativan [lorazepam]; Bactrim; Biaxin [clarithromycin]; Demerol; Levaquin [levofloxacin]; Lorazepam; and Sulfa antibiotics    Social History  History   Smoking Status    Former Smoker    Packs/day: 1.50    Years: 50.00    Types: Cigarettes    Quit date: 4/17/2015   Smokeless Tobacco    Never Used     History   Alcohol Use    0.6 oz/week    1 Cans of beer per week     Comment: weekly         Family History   Problem Relation Age of Onset    Hypertension Mother     Diabetes Father     Dementia Father     Cancer Father     Colon Cancer Father     Hypertension Father     Cancer Brother     Esophageal Cancer Sister        Review of Systems   Constitutional: Negative for chills, diaphoresis, fever, malaise/fatigue and weight loss. HENT: Negative. Eyes: Negative. Respiratory: Positive for shortness of breath. Negative for cough, hemoptysis, sputum production and wheezing. Cardiovascular: Negative. Gastrointestinal: Negative. Genitourinary: Negative.          Not getting the \"done\" feeling Musculoskeletal: Positive for back pain and myalgias. Negative for falls, joint pain and neck pain. Skin: Negative. Neurological: Positive for weakness (legs). Negative for dizziness, tingling, tremors, sensory change, speech change, focal weakness, seizures, loss of consciousness and headaches. Endo/Heme/Allergies: Negative. Psychiatric/Behavioral: Negative. PHYSICAL EXAM:  Vitals:    09/25/18 1431   BP: (!) 170/77   Pulse: 76   SpO2: 95%     Constitutional: The patient appears well-developed and well-nourished. Eyes - conjunctiva normal.  Conjugate gaze  Ear, nose, throat -No scars, masses, or lesions over external nose or ears, no atrophy of tongue  Neck-symmetric, no masses noted, no jugular vein distension  Respiration- chest wall appears symmetric, good expansion, normal effort without use of accessory muscles  Musculoskeletal - no significant wasting of muscles noted, no bony deformities, gait no gross ataxia  Extremities-no clubbing, cyanosis or edema  Skin - warm, dry, and intact. No rash, erythema, or pallor. Psychiatric - mood, affect, and behavior appear normal.     Neurologic Examination  Awake, Alert and oriented x 3  Normal speech pattern, following commands  Motor: RIGHT: 2/5iliopsoas, 4-/5 right knee extension, 4=/5 right knee flexion  Decrease to pinprick RLE      Transported in a wheelchair at today's appointment. Wound:   Well healed         DATA and IMAGING:    Lab Results   Component Value Date    WBC 10.2 06/01/2018    HGB 7.9 (L) 06/01/2018    HCT 26.0 (L) 06/01/2018    MCV 84.1 06/01/2018     09/07/2018     Lab Results   Component Value Date     06/01/2018    K 4.1 09/07/2018     06/01/2018    CO2 26 06/01/2018    BUN 42 (H) 06/01/2018    CREATININE 1.5 (H) 06/01/2018    GLUCOSE 126 (H) 06/01/2018    CALCIUM 8.1 (L) 06/01/2018    PROT 6.8 05/22/2018    LABALBU 2.9 (L) 05/22/2018    BILITOT 0.3 05/22/2018    ALKPHOS 92 05/22/2018    AST 89 (H) 05/22/2018     (H) 05/22/2018    LABGLOM 45 (A) 06/01/2018    GLOB 2.3 04/06/2017     Lab Results   Component Value Date    INR 1.07 09/07/2018    INR 1.20 (H) 05/23/2018    INR 1.10 04/17/2018    PROTIME 13.8 09/07/2018    PROTIME 15.1 (H) 05/23/2018    PROTIME 14.1 04/17/2018    No results found. Narrative   Examination. XR THORACIC SPINE (3 VIEWS)   History: Interscapular pain. The frontal and lateral views of the thoracic spine including the   lateral view of the cervicothoracic junction is obtained. There is no   previous study for comparison. There is no evidence of recent fracture or compression. There is a mild dextroscoliosis. The curve and alignment are normal. The vertebral body heights are   normal.   Chronic degenerative changes are seen in the form of osteophytes. The   posterior processes and pedicles are intact. An AICD seen in place.       Impression   No acute bony abnormality. Chronic degenerative changes of the thoracic spine. Signed by Dr Lisandra Stein on 8/6/2018 3:22 PM     Narrative   XR LUMBAR SPINE (2-3 VIEWS)    8/27/2018 1:16 PM   History: Lumbar fracture. Kyphoplasty treatment. Lumbar spine series 3 views. COMPARISON: 08/06/2018. No scoliosis. Multiple surgical clips project over the abdomen. Cardiac pacer lead noted. Diffuse osteopenia. High-grade anterior wedge compression fracture of L4 with kyphoplasty   treatment. 30-40% L5 compression fracture with interval kyphoplasty treatment. T12-L3 are intact. There is mild disc space narrowing and moderate endplate spurring at   O7-6. Diffuse calcification of abdominal aorta.       Impression   1. High-grade anterior wedge compression of L4 with kyphoplasty   treatment is unchanged. 2. Moderate compression fracture of L5 with interval kyphoplasty   treatment. Normal postoperative appearance.    Signed by Dr Paulina Ma on 8/27/2018 1:19 PM     I have personally reviewed the images and my interpretation is:  Stable, no new fractures identified     Narrative   EXAM: CT LUMBAR SPINE WO CONTRAST    DATE: 9/7/2018 8:39 AM   HISTORY: M54.5, low back pain, right leg weakness according to the   patient. COMPARISON: 8/4/2017   DLP: 986 mGy cm. TECHNIQUE: CT lumbar spine performed with 2 mm slice multiplanar   reformats. FINDINGS:   Five nonrib-bearing, lumbar-type vertebral bodies.  Compression   deformities of L4 and L5 with about 60% height loss and changes of   kyphoplasty at both levels. There is retropulsion of the posterior   margins into the spinal canal.   Mild disc height loss at L3-4. Otherwise, normal intervertebral disc   height. Intrathecal contrast present, partially opacifying the spinal   canal. Degenerative changes at the bilateral sacroiliac joints. L1-2: No disc bulge, spinal canal or foraminal stenosis. L2-3: Suspect small disc bulge. Possible mild spinal canal stenosis. No foraminal stenosis. L3-4: Disc bulge and retropulsion of the posterior superior L4 margin. Moderate spinal canal stenosis. Mild bilateral foraminal stenosis. L4-5: Disc bulge with retropulsion of the posterior superior L5   margin. Moderate spinal canal stenosis. Moderate bilateral foraminal   stenosis. L5-S1: Disc bulge. No significant spinal canal stenosis. No foraminal   stenosis. Calcified aortic atherosclerosis. Consolidation at the left lower   lobe. Patchy groundglass and consolidative opacities at right lower   lobe.       Impression   1. Vertebral body height loss of L4 and L5 (about 60%) with changes of   kyphoplasty. L3-4 and L4-5 with disc bulges and retropulsion of the   posterior vertebral body margins resulting in moderate spinal canal   stenosis. L4-5 with moderate bilateral foraminal stenosis. 2. Left greater than right lower lobe pulmonary opacities, new and   increased compared to 10/4/2017.  This could represent infection or   aspiration, but malignancy is also a

## 2018-09-28 ENCOUNTER — LAB REQUISITION (OUTPATIENT)
Dept: LAB | Facility: HOSPITAL | Age: 78
End: 2018-09-28

## 2018-09-28 DIAGNOSIS — Z00.00 ENCOUNTER FOR GENERAL ADULT MEDICAL EXAMINATION WITHOUT ABNORMAL FINDINGS: ICD-10-CM

## 2018-09-28 LAB
ALBUMIN SERPL-MCNC: 3.3 G/DL (ref 3.5–5)
ALBUMIN/GLOB SERPL: 1.3 G/DL (ref 1.1–2.5)
ALP SERPL-CCNC: 180 U/L (ref 24–120)
ALT SERPL W P-5'-P-CCNC: 62 U/L (ref 0–54)
ANION GAP SERPL CALCULATED.3IONS-SCNC: 10 MMOL/L (ref 4–13)
AST SERPL-CCNC: 39 U/L (ref 7–45)
BASOPHILS # BLD AUTO: 0.02 10*3/MM3 (ref 0–0.2)
BASOPHILS NFR BLD AUTO: 0.1 % (ref 0–2)
BILIRUB SERPL-MCNC: 0.3 MG/DL (ref 0.1–1)
BUN BLD-MCNC: 51 MG/DL (ref 5–21)
BUN/CREAT SERPL: 31.5 (ref 7–25)
CALCIUM SPEC-SCNC: 8.6 MG/DL (ref 8.4–10.4)
CHLORIDE SERPL-SCNC: 94 MMOL/L (ref 98–110)
CO2 SERPL-SCNC: 31 MMOL/L (ref 24–31)
CREAT BLD-MCNC: 1.62 MG/DL (ref 0.5–1.4)
DEPRECATED RDW RBC AUTO: 58.8 FL (ref 40–54)
EOSINOPHIL # BLD AUTO: 0.01 10*3/MM3 (ref 0–0.7)
EOSINOPHIL NFR BLD AUTO: 0.1 % (ref 0–4)
ERYTHROCYTE [DISTWIDTH] IN BLOOD BY AUTOMATED COUNT: 18.7 % (ref 12–15)
GFR SERPL CREATININE-BSD FRML MDRD: 42 ML/MIN/1.73
GLOBULIN UR ELPH-MCNC: 2.5 GM/DL
GLUCOSE BLD-MCNC: 165 MG/DL (ref 70–100)
HCT VFR BLD AUTO: 28.7 % (ref 40–52)
HGB BLD-MCNC: 9.3 G/DL (ref 14–18)
IMM GRANULOCYTES # BLD: 0.72 10*3/MM3 (ref 0–0.03)
IMM GRANULOCYTES NFR BLD: 4.2 % (ref 0–5)
LYMPHOCYTES # BLD AUTO: 1.58 10*3/MM3 (ref 0.72–4.86)
LYMPHOCYTES NFR BLD AUTO: 9.3 % (ref 15–45)
MCH RBC QN AUTO: 28.9 PG (ref 28–32)
MCHC RBC AUTO-ENTMCNC: 32.4 G/DL (ref 33–36)
MCV RBC AUTO: 89.1 FL (ref 82–95)
MONOCYTES # BLD AUTO: 1.46 10*3/MM3 (ref 0.19–1.3)
MONOCYTES NFR BLD AUTO: 8.6 % (ref 4–12)
NEUTROPHILS # BLD AUTO: 13.28 10*3/MM3 (ref 1.87–8.4)
NEUTROPHILS NFR BLD AUTO: 77.7 % (ref 39–78)
NRBC BLD MANUAL-RTO: 0 /100 WBC (ref 0–0)
PLATELET # BLD AUTO: 235 10*3/MM3 (ref 130–400)
PMV BLD AUTO: 10.7 FL (ref 6–12)
POTASSIUM BLD-SCNC: 3.8 MMOL/L (ref 3.5–5.3)
PROT SERPL-MCNC: 5.8 G/DL (ref 6.3–8.7)
RBC # BLD AUTO: 3.22 10*6/MM3 (ref 4.8–5.9)
SODIUM BLD-SCNC: 135 MMOL/L (ref 135–145)
WBC NRBC COR # BLD: 17.07 10*3/MM3 (ref 4.8–10.8)

## 2018-09-28 PROCEDURE — 85025 COMPLETE CBC W/AUTO DIFF WBC: CPT | Performed by: NURSE PRACTITIONER

## 2018-09-28 PROCEDURE — 80053 COMPREHEN METABOLIC PANEL: CPT | Performed by: NURSE PRACTITIONER

## 2018-09-28 PROCEDURE — 36415 COLL VENOUS BLD VENIPUNCTURE: CPT | Performed by: NURSE PRACTITIONER

## 2018-10-01 ENCOUNTER — LAB REQUISITION (OUTPATIENT)
Dept: LAB | Facility: HOSPITAL | Age: 78
End: 2018-10-01

## 2018-10-01 DIAGNOSIS — Z00.00 ENCOUNTER FOR GENERAL ADULT MEDICAL EXAMINATION WITHOUT ABNORMAL FINDINGS: ICD-10-CM

## 2018-10-01 LAB
ALBUMIN SERPL-MCNC: 2.5 G/DL (ref 3.5–5)
ALBUMIN/GLOB SERPL: 1 G/DL (ref 1.1–2.5)
ALP SERPL-CCNC: 134 U/L (ref 24–120)
ALT SERPL W P-5'-P-CCNC: 54 U/L (ref 0–54)
ANION GAP SERPL CALCULATED.3IONS-SCNC: 8 MMOL/L (ref 4–13)
AST SERPL-CCNC: 35 U/L (ref 7–45)
BASOPHILS # BLD AUTO: 0.02 10*3/MM3 (ref 0–0.2)
BASOPHILS NFR BLD AUTO: 0.2 % (ref 0–2)
BILIRUB SERPL-MCNC: 0.4 MG/DL (ref 0.1–1)
BUN BLD-MCNC: 35 MG/DL (ref 5–21)
BUN/CREAT SERPL: 25.4 (ref 7–25)
CALCIUM SPEC-SCNC: 8.1 MG/DL (ref 8.4–10.4)
CHLORIDE SERPL-SCNC: 98 MMOL/L (ref 98–110)
CO2 SERPL-SCNC: 30 MMOL/L (ref 24–31)
CREAT BLD-MCNC: 1.38 MG/DL (ref 0.5–1.4)
DEPRECATED RDW RBC AUTO: 60.5 FL (ref 40–54)
EOSINOPHIL # BLD AUTO: 0.32 10*3/MM3 (ref 0–0.7)
EOSINOPHIL NFR BLD AUTO: 3.6 % (ref 0–4)
ERYTHROCYTE [DISTWIDTH] IN BLOOD BY AUTOMATED COUNT: 18.8 % (ref 12–15)
GFR SERPL CREATININE-BSD FRML MDRD: 50 ML/MIN/1.73
GLOBULIN UR ELPH-MCNC: 2.4 GM/DL
GLUCOSE BLD-MCNC: 122 MG/DL (ref 70–100)
HCT VFR BLD AUTO: 25.6 % (ref 40–52)
HGB BLD-MCNC: 8.3 G/DL (ref 14–18)
IMM GRANULOCYTES # BLD: 0.35 10*3/MM3 (ref 0–0.03)
IMM GRANULOCYTES NFR BLD: 3.9 % (ref 0–5)
LYMPHOCYTES # BLD AUTO: 1.16 10*3/MM3 (ref 0.72–4.86)
LYMPHOCYTES NFR BLD AUTO: 12.9 % (ref 15–45)
MCH RBC QN AUTO: 28.5 PG (ref 28–32)
MCHC RBC AUTO-ENTMCNC: 32.4 G/DL (ref 33–36)
MCV RBC AUTO: 88 FL (ref 82–95)
MONOCYTES # BLD AUTO: 0.82 10*3/MM3 (ref 0.19–1.3)
MONOCYTES NFR BLD AUTO: 9.1 % (ref 4–12)
NEUTROPHILS # BLD AUTO: 6.3 10*3/MM3 (ref 1.87–8.4)
NEUTROPHILS NFR BLD AUTO: 70.3 % (ref 39–78)
NRBC BLD MANUAL-RTO: 0 /100 WBC (ref 0–0)
PLATELET # BLD AUTO: 130 10*3/MM3 (ref 130–400)
PMV BLD AUTO: 10.3 FL (ref 6–12)
POTASSIUM BLD-SCNC: 3.8 MMOL/L (ref 3.5–5.3)
PROT SERPL-MCNC: 4.9 G/DL (ref 6.3–8.7)
RBC # BLD AUTO: 2.91 10*6/MM3 (ref 4.8–5.9)
SODIUM BLD-SCNC: 136 MMOL/L (ref 135–145)
WBC NRBC COR # BLD: 8.97 10*3/MM3 (ref 4.8–10.8)

## 2018-10-01 PROCEDURE — 80053 COMPREHEN METABOLIC PANEL: CPT | Performed by: NURSE PRACTITIONER

## 2018-10-01 PROCEDURE — 85025 COMPLETE CBC W/AUTO DIFF WBC: CPT | Performed by: NURSE PRACTITIONER

## 2018-10-01 PROCEDURE — 36415 COLL VENOUS BLD VENIPUNCTURE: CPT | Performed by: NURSE PRACTITIONER

## 2018-10-18 DIAGNOSIS — Z95.810 AICD (AUTOMATIC CARDIOVERTER/DEFIBRILLATOR) PRESENT: Primary | ICD-10-CM

## 2018-10-18 DIAGNOSIS — I47.29 NSVT (NONSUSTAINED VENTRICULAR TACHYCARDIA): ICD-10-CM

## 2018-10-18 PROCEDURE — 93295 DEV INTERROG REMOTE 1/2/MLT: CPT | Performed by: CLINICAL NURSE SPECIALIST

## 2018-10-18 PROCEDURE — 93296 REM INTERROG EVL PM/IDS: CPT | Performed by: CLINICAL NURSE SPECIALIST

## 2018-10-22 ENCOUNTER — HOSPITAL ENCOUNTER (OUTPATIENT)
Dept: PAIN MANAGEMENT | Age: 78
Discharge: HOME | End: 2018-10-22
Payer: MEDICARE

## 2018-10-22 VITALS
BODY MASS INDEX: 25.18 KG/M2 | HEIGHT: 69 IN | HEART RATE: 69 BPM | RESPIRATION RATE: 18 BRPM | SYSTOLIC BLOOD PRESSURE: 162 MMHG | TEMPERATURE: 97.1 F | DIASTOLIC BLOOD PRESSURE: 75 MMHG | WEIGHT: 170 LBS | OXYGEN SATURATION: 95 %

## 2018-10-22 DIAGNOSIS — M54.16 LUMBAR RADICULOPATHY: ICD-10-CM

## 2018-10-22 DIAGNOSIS — M79.18 MYOFASCIAL PAIN: ICD-10-CM

## 2018-10-22 DIAGNOSIS — E55.9 VITAMIN D DEFICIENCY: Primary | ICD-10-CM

## 2018-10-22 DIAGNOSIS — M46.1 SACROILIITIS (HCC): ICD-10-CM

## 2018-10-22 PROCEDURE — 99205 OFFICE O/P NEW HI 60 MIN: CPT

## 2018-10-22 PROCEDURE — 99204 OFFICE O/P NEW MOD 45 MIN: CPT | Performed by: NURSE PRACTITIONER

## 2018-10-22 RX ORDER — GABAPENTIN 100 MG/1
100 CAPSULE ORAL 2 TIMES DAILY PRN
COMMUNITY
End: 2019-01-28

## 2018-10-22 RX ORDER — TRAMADOL HYDROCHLORIDE 50 MG/1
50 TABLET ORAL 2 TIMES DAILY PRN
COMMUNITY
End: 2019-01-28

## 2018-10-22 RX ORDER — M-VIT,TX,IRON,MINS/CALC/FOLIC 27MG-0.4MG
1 TABLET ORAL DAILY
COMMUNITY

## 2018-10-22 RX ORDER — MAGNESIUM OXIDE 400 MG/1
250 TABLET ORAL DAILY
COMMUNITY
End: 2020-05-13

## 2018-10-22 RX ORDER — EMOLLIENT BASE
CREAM (GRAM) TOPICAL
Qty: 300 G | Refills: 5 | Status: SHIPPED | OUTPATIENT
Start: 2018-10-22 | End: 2018-10-24 | Stop reason: SDUPTHER

## 2018-10-22 ASSESSMENT — PAIN DESCRIPTION - DESCRIPTORS: DESCRIPTORS: RADIATING;ACHING;CONSTANT

## 2018-10-22 ASSESSMENT — PAIN DESCRIPTION - PROGRESSION: CLINICAL_PROGRESSION: NOT CHANGED

## 2018-10-22 ASSESSMENT — PAIN DESCRIPTION - LOCATION: LOCATION: BACK

## 2018-10-22 ASSESSMENT — ACTIVITIES OF DAILY LIVING (ADL): EFFECT OF PAIN ON DAILY ACTIVITIES: LIMITS ACTIVITY

## 2018-10-22 ASSESSMENT — PAIN DESCRIPTION - ORIENTATION: ORIENTATION: LOWER

## 2018-10-22 ASSESSMENT — PAIN DESCRIPTION - FREQUENCY: FREQUENCY: CONTINUOUS

## 2018-10-22 ASSESSMENT — PAIN DESCRIPTION - ONSET: ONSET: ON-GOING

## 2018-10-22 ASSESSMENT — PAIN DESCRIPTION - DIRECTION: RADIATING_TOWARDS: RADIATES INTO BILATERAL LOWER EXTREMITIES

## 2018-10-22 ASSESSMENT — PAIN DESCRIPTION - PAIN TYPE: TYPE: CHRONIC PAIN

## 2018-10-22 ASSESSMENT — PAIN SCALES - GENERAL: PAINLEVEL_OUTOF10: 8

## 2018-10-22 NOTE — PROGRESS NOTES
Nursing Admission Record    Current Issues / Falls / ER Visits:  Yes new patient with low back pain that radiates into bilateral lower extremities    Radiology exams received during the last 12 months: Yes XR Lumbar Spine        When 9/2018                                              Where [facility]       Imaging on chart: Yes         Imaging records requested: No  MRI exams received in the past 2 years:  No  Physical therapy during the last 6 months: Yes       When: currently                                             Where [facility] during the last 12 months: Yes    Education Provided:  [x] Review of Barb Stovall  [x] Agreement Review  [] Compliance Issues Discussed    [] Cognitive Behavior Needs [x] Exercise [] Review of Test [] Financial Issues  [x] Tobacco/Alcohol Use [x] Teaching [x] New Patient [] Picture Obtained    Physician Plan:  [] Outgoing Referral  [] Pharmacy Consult  [] Test Ordered   [] Obtained Test Results / Consult Notes  [] UDS due at next visit, verified per EPIC      [] Suspected Physical Abuse or Suicide Risk assessed - IF YES COMPLETE QUESTIONS BELOW    If any of the following questions are answered yes - contact attending physician for referral:    Has been considering harming self to escape stress, pain problems? [] YES  [x] NO  Has a suicide plan? [] YES  [x] NO  Has attempted suicide in the past?   [] YES  [x] NO  Has a close friend or family member who committed suicide? [] YES  [x] NO    Patient Referred To : Additional Notes:    Assessment Completed by:  Electronically signed by Jorge Luis Mcqueen RN on 10/22/2018 at 1:13 PM                                                                        PEG Score    1. What number best describes you pain on average in the past week?         0           1          2         3         4        5        6         7       8         9     10   No Pain

## 2018-10-22 NOTE — H&P
Fluticasone-Umeclidin-Vilant (TRELEGY ELLIPTA) 100-62.5-25 MCG/INH AEPB Inhale 1 puff into the lungs daily      OXYGEN Inhale 4 L into the lungs as needed      atorvastatin (LIPITOR) 40 MG tablet TAKE 1 TABLET DAILY 90 tablet 3    bumetanide (BUMEX) 1 MG tablet Take 1 tablet by mouth 2 times daily (Patient taking differently: Take 1 mg by mouth daily ) 180 tablet 1    amiodarone (CORDARONE) 200 MG tablet Take 1 tablet by mouth daily 90 tablet 3    isosorbide mononitrate (IMDUR) 60 MG extended release tablet Take 1 tablet by mouth daily 90 tablet 3    KLOR-CON M20 20 MEQ extended release tablet Take 20 mEq by mouth daily       albuterol (PROVENTIL) (2.5 MG/3ML) 0.083% nebulizer solution Take 2.5 mg by nebulization every 6 hours as needed for Wheezing       No current facility-administered medications for this encounter. Allergies  Butamben-tetracaine-benzocaine; Ativan [lorazepam]; Bactrim; Biaxin [clarithromycin]; Demerol; Levaquin [levofloxacin]; Lorazepam; and Sulfa antibiotics    Current Medications  Current Outpatient Prescriptions   Medication Sig Dispense Refill    traMADol (ULTRAM) 50 MG tablet Take 50 mg by mouth 2 times daily as needed for Pain. Mary Santacruz gabapentin (NEURONTIN) 100 MG capsule Take 100 mg by mouth 2 times daily as needed. Mary Santacruz magnesium oxide (MAG-OX) 400 MG tablet Take 400 mg by mouth daily      Calcium Carb-Cholecalciferol (CALCIUM 600+D) 600-800 MG-UNIT TABS Take by mouth      Multiple Vitamins-Minerals (THERAPEUTIC MULTIVITAMIN-MINERALS) tablet Take 1 tablet by mouth daily      Cream Base [facility] Pain Cream #4 Add Gabapentin 6% Apply 1-2 pumps to affected area 3-4 times a day 300 g 5    docusate sodium (COLACE) 100 MG capsule Take 1 capsule by mouth 2 times daily as needed for Constipation 30 capsule 1    ondansetron (ZOFRAN) 4 MG tablet Take 1 tablet by mouth every 8 hours as needed for Nausea or Vomiting 30 tablet 1    vitamin D (ERGOCALCIFEROL) 71015 units CAPS capsule Take 1 capsule by mouth once a week 30 capsule 3    Misc. Devices [facility]) MISC 1 each by Does not apply route daily 1 each 0    dexamethasone (DECADRON) 6 MG tablet Take 6 mg by mouth 2 times daily (with meals)      meloxicam (MOBIC) 7.5 MG tablet Take 7.5 mg by mouth daily      DULoxetine (CYMBALTA) 60 MG extended release capsule Take 1 capsule by mouth daily 90 capsule 1    Pediatric Multivitamins-Fl (MULTI VIT/FL PO) Take by mouth      Fluticasone-Umeclidin-Vilant (TRELEGY ELLIPTA) 100-62.5-25 MCG/INH AEPB Inhale 1 puff into the lungs daily      OXYGEN Inhale 4 L into the lungs as needed      atorvastatin (LIPITOR) 40 MG tablet TAKE 1 TABLET DAILY 90 tablet 3    bumetanide (BUMEX) 1 MG tablet Take 1 tablet by mouth 2 times daily (Patient taking differently: Take 1 mg by mouth daily ) 180 tablet 1    amiodarone (CORDARONE) 200 MG tablet Take 1 tablet by mouth daily 90 tablet 3    isosorbide mononitrate (IMDUR) 60 MG extended release tablet Take 1 tablet by mouth daily 90 tablet 3    KLOR-CON M20 20 MEQ extended release tablet Take 20 mEq by mouth daily       albuterol (PROVENTIL) (2.5 MG/3ML) 0.083% nebulizer solution Take 2.5 mg by nebulization every 6 hours as needed for Wheezing       No current facility-administered medications for this encounter.         Social History    Social History     Social History    Marital status:      Spouse name: N/A    Number of children: N/A    Years of education: N/A     Social History Main Topics    Smoking status: Former Smoker     Packs/day: 1.50     Years: 50.00     Types: Cigarettes     Quit date: 4/17/2015    Smokeless tobacco: Never Used    Alcohol use 0.6 oz/week     1 Cans of beer per week      Comment: weekly    Drug use: No    Sexual activity: Not Currently     Other Topics Concern    None     Social History Narrative    None         Family History  family history includes Cancer in his brother and father; Colon Cancer in his father; Dementia in his father; Diabetes in his father; Esophageal Cancer in his sister; Hypertension in his father and mother. Review of Systems:  Review of Systems   Constitutional: Negative for activity change. Gastrointestinal: Negative for constipation. Musculoskeletal: Positive for arthralgias, back pain, gait problem (Uses assistive device primarily wheelchair) and myalgias. Negative for joint swelling, neck pain and neck stiffness. Neurological: Positive for tingling, weakness, numbness and paresthesias. Psychiatric/Behavioral: Negative for agitation, self-injury and suicidal ideas. The patient is not nervous/anxious. 14 point ROS negative besides that noted in HPI    Opioid RiskTool  Anastacio ea box that applies Item Score If Female  Item Score If Male   Family History of Substance Abuse Alcohol  []  1  3    IllegalDrugs  []  2  3    Prescription Drugs  []  4  4           Personal History of Substance Abuse Alcohol  []  3  3    Illegal Drugs  []  4  4    Prescription Drugs  []  5  5           Age Kd Grise if between 12 - 39)   []  1  1           History of Preadolescent Sexual Abuse   []  3  0           Psychological Disease ADD  OCD  Bipolar  Schizophrenia  []  2  2    Depression  []  1  1   Total      0    Total Score Risk Category Low Risk  0 - 3  Mod Risk  4 - 7  High Risk >= 8    Low risk      Recent Imaging:    XR Lumbar Spine  Impression  1. Similar L4 and L5 height loss compared to prior. The posterior margin retropulsion is better demonstrated on same-day CT. 2. Disc height loss at L3-4 and L4-5.  3. Osteopenia. 4. Calcified aortic atherosclerosis. Signed by Dr Juan David Hanna on 9/7/2018 2:18 PM    CT Lumbar Spine wo contrast  FINDINGS:  Five nonrib-bearing, lumbar-type vertebral bodies. Compression deformities of L4 and L5 with about 60% height loss and changes of kyphoplasty at both levels.  There is retropulsion of the posterior margins into the spinal canal.  Mild disc

## 2018-10-23 PROBLEM — M54.16 LUMBAR RADICULOPATHY: Status: ACTIVE | Noted: 2018-10-23

## 2018-10-23 PROBLEM — M79.18 MYOFASCIAL PAIN: Status: ACTIVE | Noted: 2018-10-23

## 2018-10-23 PROBLEM — M46.1 SACROILIITIS (HCC): Status: ACTIVE | Noted: 2018-10-23

## 2018-10-23 ASSESSMENT — ENCOUNTER SYMPTOMS
CONSTIPATION: 0
BACK PAIN: 1

## 2018-10-24 RX ORDER — EMOLLIENT BASE
CREAM (GRAM) TOPICAL
Qty: 300 G | Refills: 5 | Status: SHIPPED | OUTPATIENT
Start: 2018-10-24 | End: 2019-04-08 | Stop reason: SDUPTHER

## 2018-11-07 ENCOUNTER — HOSPITAL ENCOUNTER (OUTPATIENT)
Dept: PAIN MANAGEMENT | Age: 78
Discharge: HOME | End: 2018-11-07
Payer: MEDICARE

## 2018-11-07 VITALS
HEART RATE: 76 BPM | WEIGHT: 170 LBS | BODY MASS INDEX: 25.18 KG/M2 | OXYGEN SATURATION: 93 % | HEIGHT: 69 IN | DIASTOLIC BLOOD PRESSURE: 80 MMHG | TEMPERATURE: 98.4 F | SYSTOLIC BLOOD PRESSURE: 194 MMHG | RESPIRATION RATE: 18 BRPM

## 2018-11-07 DIAGNOSIS — M51.36 LUMBAR DEGENERATIVE DISC DISEASE: ICD-10-CM

## 2018-11-07 PROCEDURE — 3209999900 FLUORO FOR SURGICAL PROCEDURES

## 2018-11-07 PROCEDURE — 62323 NJX INTERLAMINAR LMBR/SAC: CPT

## 2018-11-07 PROCEDURE — 2580000003 HC RX 258

## 2018-11-07 PROCEDURE — 6360000002 HC RX W HCPCS

## 2018-11-07 PROCEDURE — 2500000003 HC RX 250 WO HCPCS

## 2018-11-07 RX ORDER — 0.9 % SODIUM CHLORIDE 0.9 %
VIAL (ML) INJECTION
Status: COMPLETED | OUTPATIENT
Start: 2018-11-07 | End: 2018-11-07

## 2018-11-07 RX ORDER — LIDOCAINE HYDROCHLORIDE 10 MG/ML
INJECTION, SOLUTION EPIDURAL; INFILTRATION; INTRACAUDAL; PERINEURAL
Status: COMPLETED | OUTPATIENT
Start: 2018-11-07 | End: 2018-11-07

## 2018-11-07 RX ORDER — METHYLPREDNISOLONE ACETATE 80 MG/ML
INJECTION, SUSPENSION INTRA-ARTICULAR; INTRALESIONAL; INTRAMUSCULAR; SOFT TISSUE
Status: COMPLETED | OUTPATIENT
Start: 2018-11-07 | End: 2018-11-07

## 2018-11-07 RX ADMIN — METHYLPREDNISOLONE ACETATE 80 MG: 80 INJECTION, SUSPENSION INTRA-ARTICULAR; INTRALESIONAL; INTRAMUSCULAR; SOFT TISSUE at 07:19

## 2018-11-07 RX ADMIN — Medication 3 ML: at 07:20

## 2018-11-07 RX ADMIN — LIDOCAINE HYDROCHLORIDE 5 ML: 10 INJECTION, SOLUTION EPIDURAL; INFILTRATION; INTRACAUDAL; PERINEURAL at 07:17

## 2018-11-07 RX ADMIN — Medication 2 ML: at 07:18

## 2018-11-07 ASSESSMENT — PAIN - FUNCTIONAL ASSESSMENT
PAIN_FUNCTIONAL_ASSESSMENT: 0-10
PAIN_FUNCTIONAL_ASSESSMENT: 0-10

## 2018-11-07 ASSESSMENT — ACTIVITIES OF DAILY LIVING (ADL): EFFECT OF PAIN ON DAILY ACTIVITIES: DAILY CHORES AND ACTIVITIES

## 2018-11-09 ENCOUNTER — TELEPHONE (OUTPATIENT)
Dept: PAIN MANAGEMENT | Age: 78
End: 2018-11-09

## 2018-11-09 NOTE — TELEPHONE ENCOUNTER
I spoke with Nahomy Garcia wife on 11/9/18 @ 4:05 pm about his injection he had on 11/7/18. She reported that he is doing a lot better although he has been experiencing weak legs since the procedure. She said he is walking a lot better now. His pain was rated at a 7 prior to the injection and is at a 5 level now. He has had at least 80% of his pain relieved.

## 2018-11-09 NOTE — TELEPHONE ENCOUNTER
I called Shelly Dawkins on 11/9/18 @ 12:11 as a follow up call from his lumbar epidural steroid injection at the level of L5-S1 that he received on 11/7/18. He did not answer and a voice message was left for him to call me back.

## 2018-11-13 ENCOUNTER — TELEPHONE (OUTPATIENT)
Dept: CARDIOLOGY | Age: 78
End: 2018-11-13

## 2018-11-27 ENCOUNTER — HOSPITAL ENCOUNTER (OUTPATIENT)
Dept: PAIN MANAGEMENT | Age: 78
Discharge: HOME | End: 2018-11-27
Payer: MEDICARE

## 2018-11-27 VITALS
SYSTOLIC BLOOD PRESSURE: 168 MMHG | OXYGEN SATURATION: 97 % | HEART RATE: 99 BPM | RESPIRATION RATE: 18 BRPM | TEMPERATURE: 97.8 F | DIASTOLIC BLOOD PRESSURE: 71 MMHG

## 2018-11-27 PROCEDURE — 20553 NJX 1/MLT TRIGGER POINTS 3/>: CPT

## 2018-11-27 PROCEDURE — 20553 NJX 1/MLT TRIGGER POINTS 3/>: CPT | Performed by: NURSE PRACTITIONER

## 2018-11-27 PROCEDURE — 2500000003 HC RX 250 WO HCPCS

## 2018-11-27 RX ORDER — BUPIVACAINE HYDROCHLORIDE 5 MG/ML
INJECTION, SOLUTION EPIDURAL; INTRACAUDAL
Status: COMPLETED | OUTPATIENT
Start: 2018-11-27 | End: 2018-11-27

## 2018-11-27 RX ORDER — LIDOCAINE HYDROCHLORIDE 10 MG/ML
INJECTION, SOLUTION EPIDURAL; INFILTRATION; INTRACAUDAL; PERINEURAL
Status: COMPLETED | OUTPATIENT
Start: 2018-11-27 | End: 2018-11-27

## 2018-11-27 RX ADMIN — LIDOCAINE HYDROCHLORIDE 20 ML: 10 INJECTION, SOLUTION EPIDURAL; INFILTRATION; INTRACAUDAL; PERINEURAL at 15:01

## 2018-11-27 RX ADMIN — BUPIVACAINE HYDROCHLORIDE 20 ML: 5 INJECTION, SOLUTION EPIDURAL; INTRACAUDAL at 15:01

## 2019-01-18 DIAGNOSIS — I47.20 VENTRICULAR TACHYARRHYTHMIA: ICD-10-CM

## 2019-01-18 DIAGNOSIS — Z95.810 AICD (AUTOMATIC CARDIOVERTER/DEFIBRILLATOR) PRESENT: Primary | ICD-10-CM

## 2019-01-18 PROCEDURE — 93296 REM INTERROG EVL PM/IDS: CPT | Performed by: CLINICAL NURSE SPECIALIST

## 2019-01-18 PROCEDURE — 93295 DEV INTERROG REMOTE 1/2/MLT: CPT | Performed by: CLINICAL NURSE SPECIALIST

## 2019-01-28 ENCOUNTER — HOSPITAL ENCOUNTER (OUTPATIENT)
Dept: PAIN MANAGEMENT | Age: 79
Discharge: HOME | End: 2019-01-28
Payer: MEDICARE

## 2019-01-28 VITALS
WEIGHT: 170 LBS | DIASTOLIC BLOOD PRESSURE: 83 MMHG | HEART RATE: 68 BPM | SYSTOLIC BLOOD PRESSURE: 162 MMHG | RESPIRATION RATE: 18 BRPM | TEMPERATURE: 96.4 F | HEIGHT: 69 IN | BODY MASS INDEX: 25.18 KG/M2 | OXYGEN SATURATION: 95 %

## 2019-01-28 DIAGNOSIS — M62.838 SPASM OF PIRIFORMIS MUSCLE: ICD-10-CM

## 2019-01-28 PROCEDURE — 99214 OFFICE O/P EST MOD 30 MIN: CPT | Performed by: NURSE PRACTITIONER

## 2019-01-28 PROCEDURE — 99213 OFFICE O/P EST LOW 20 MIN: CPT

## 2019-01-28 RX ORDER — AMITRIPTYLINE HYDROCHLORIDE 25 MG/1
25 TABLET, FILM COATED ORAL NIGHTLY
Qty: 30 TABLET | Refills: 1 | Status: SHIPPED | OUTPATIENT
Start: 2019-01-28 | End: 2019-03-25 | Stop reason: SDUPTHER

## 2019-01-28 ASSESSMENT — PAIN DESCRIPTION - LOCATION: LOCATION: BACK

## 2019-01-28 ASSESSMENT — PAIN DESCRIPTION - PAIN TYPE: TYPE: CHRONIC PAIN

## 2019-01-28 ASSESSMENT — PAIN DESCRIPTION - PROGRESSION: CLINICAL_PROGRESSION: NOT CHANGED

## 2019-01-28 ASSESSMENT — ACTIVITIES OF DAILY LIVING (ADL): EFFECT OF PAIN ON DAILY ACTIVITIES: LIMITS ACTIVITY

## 2019-01-28 ASSESSMENT — PAIN DESCRIPTION - DIRECTION: RADIATING_TOWARDS: INTO BILATERAL LOWER EXTREMITIES

## 2019-01-28 ASSESSMENT — ENCOUNTER SYMPTOMS
CONSTIPATION: 0
BACK PAIN: 1

## 2019-01-28 ASSESSMENT — PAIN - FUNCTIONAL ASSESSMENT: PAIN_FUNCTIONAL_ASSESSMENT: PREVENTS OR INTERFERES SOME ACTIVE ACTIVITIES AND ADLS

## 2019-01-28 ASSESSMENT — PAIN DESCRIPTION - ONSET: ONSET: ON-GOING

## 2019-01-28 ASSESSMENT — PAIN DESCRIPTION - FREQUENCY: FREQUENCY: CONTINUOUS

## 2019-01-28 ASSESSMENT — PAIN DESCRIPTION - ORIENTATION: ORIENTATION: LOWER

## 2019-01-28 ASSESSMENT — PAIN DESCRIPTION - DESCRIPTORS: DESCRIPTORS: ACHING;BURNING;CONSTANT;RADIATING

## 2019-02-13 ENCOUNTER — HOSPITAL ENCOUNTER (OUTPATIENT)
Dept: PAIN MANAGEMENT | Age: 79
Discharge: HOME | End: 2019-02-13
Payer: MEDICARE

## 2019-02-13 VITALS
OXYGEN SATURATION: 92 % | TEMPERATURE: 97.4 F | HEART RATE: 92 BPM | SYSTOLIC BLOOD PRESSURE: 146 MMHG | DIASTOLIC BLOOD PRESSURE: 67 MMHG | RESPIRATION RATE: 20 BRPM

## 2019-02-13 PROCEDURE — 20553 NJX 1/MLT TRIGGER POINTS 3/>: CPT

## 2019-02-13 PROCEDURE — 2500000003 HC RX 250 WO HCPCS

## 2019-02-13 PROCEDURE — 20553 NJX 1/MLT TRIGGER POINTS 3/>: CPT | Performed by: NURSE PRACTITIONER

## 2019-02-13 RX ORDER — LIDOCAINE HYDROCHLORIDE 10 MG/ML
INJECTION, SOLUTION EPIDURAL; INFILTRATION; INTRACAUDAL; PERINEURAL
Status: COMPLETED | OUTPATIENT
Start: 2019-02-13 | End: 2019-02-13

## 2019-02-13 RX ORDER — BUPIVACAINE HYDROCHLORIDE 5 MG/ML
INJECTION, SOLUTION EPIDURAL; INTRACAUDAL
Status: COMPLETED | OUTPATIENT
Start: 2019-02-13 | End: 2019-02-13

## 2019-02-13 RX ADMIN — BUPIVACAINE HYDROCHLORIDE 20 ML: 5 INJECTION, SOLUTION EPIDURAL; INTRACAUDAL at 13:56

## 2019-02-13 RX ADMIN — LIDOCAINE HYDROCHLORIDE 20 ML: 10 INJECTION, SOLUTION EPIDURAL; INFILTRATION; INTRACAUDAL; PERINEURAL at 13:57

## 2019-02-15 ENCOUNTER — TELEPHONE (OUTPATIENT)
Dept: PAIN MANAGEMENT | Age: 79
End: 2019-02-15

## 2019-02-15 NOTE — TELEPHONE ENCOUNTER
Jhony Jacskon was called as a follow up from his bilateral piriformis trigger point injections that he had on February 13th by August Button. He stated that he does feel some better and able to walk around without his wheelchair and felt like he has had 70% pain relief.

## 2019-03-25 RX ORDER — AMITRIPTYLINE HYDROCHLORIDE 25 MG/1
25 TABLET, FILM COATED ORAL NIGHTLY
Qty: 30 TABLET | Refills: 2 | Status: SHIPPED | OUTPATIENT
Start: 2019-03-25 | End: 2020-05-13

## 2019-04-02 ENCOUNTER — HOSPITAL ENCOUNTER (OUTPATIENT)
Dept: VASCULAR LAB | Age: 79
Discharge: HOME | End: 2019-04-02
Payer: MEDICARE

## 2019-04-02 ENCOUNTER — OFFICE VISIT (OUTPATIENT)
Dept: VASCULAR SURGERY | Age: 79
End: 2019-04-02
Payer: MEDICARE

## 2019-04-02 VITALS
SYSTOLIC BLOOD PRESSURE: 152 MMHG | TEMPERATURE: 96.7 F | OXYGEN SATURATION: 96 % | HEART RATE: 61 BPM | RESPIRATION RATE: 18 BRPM | DIASTOLIC BLOOD PRESSURE: 63 MMHG

## 2019-04-02 DIAGNOSIS — I65.23 BILATERAL CAROTID ARTERY STENOSIS: Primary | ICD-10-CM

## 2019-04-02 DIAGNOSIS — I70.219 ATHEROSCLEROSIS OF LOWER EXTREMITY WITH CLAUDICATION (HCC): ICD-10-CM

## 2019-04-02 DIAGNOSIS — I65.23 BILATERAL CAROTID ARTERY STENOSIS: ICD-10-CM

## 2019-04-02 DIAGNOSIS — I73.9 PVD (PERIPHERAL VASCULAR DISEASE) (HCC): ICD-10-CM

## 2019-04-02 PROCEDURE — 93923 UPR/LXTR ART STDY 3+ LVLS: CPT

## 2019-04-02 PROCEDURE — 99213 OFFICE O/P EST LOW 20 MIN: CPT | Performed by: PHYSICIAN ASSISTANT

## 2019-04-02 PROCEDURE — 93880 EXTRACRANIAL BILAT STUDY: CPT

## 2019-04-02 RX ORDER — DULOXETIN HYDROCHLORIDE 60 MG/1
60 CAPSULE, DELAYED RELEASE ORAL DAILY
COMMUNITY

## 2019-04-02 NOTE — PROGRESS NOTES
Patient Care Team:  Alfred Senior DO as PCP - General (Family Medicine)  Patricio Sheramn MD (Cardiology)  Jacquelin Lennox, MD as Consulting Physician (Vascular Surgery)  Silvio Moscoso DO as Consulting Physician (Neurosurgery)  KANCHAN Cuevas as Nurse Practitioner (Family Nurse Practitioner)      History and Physical    Mr. Aga Branham has prior history of peripheral vascular disease of the lower extremities and carotid artery stenosis. Current medication include a statin daily. He reports that he cannot take ASA, he was on it before and has some issues with GI bleeding. Broderick Han has not had new wounds. He reports bilateral hip pain, right > than the left. He gets injections in his helps which he reports helps with the pain. He reports intermittent right calf tingling with walking. He denies any IRP.     Tomas Eller is a 67 y.o. male [facility] the [facility]  Patient Active Problem List    Diagnosis Date Noted    CAD (coronary artery disease)      Priority: High     01/12/06 CABG x 6  LIMA-LAD, sequential VG-RI & OM, VG-acute maginal, PDA and PLOM Willodean Abril, [address])  4/22/2008  SE  negative for myocardial ischemia  1/9/2012  lexiscan negative for myocardial ischemia, EF 53%  04/22/2015  Symptomatic paroxymal VT (longest 17 beats at 202 bpm, on metroprolol) on  Holter  04/23/2015  Cath  Patent bypass conduits, normal LVFX, no AI, abdominal aortogram ok  04/24/2015  Single lead ICDts,  3/10/16  VT storm  5/5/2017  lexiscan Positive for inferior myocardial ischemia, EF 65%, intermediate risk findings, AUC indication 16, AUC score 7  5/15/17  Cath  Patent LIMA-LAD, patent VG-OM, patent VG-RCA, normal LVFX                   Spasm of piriformis muscle 01/28/2019    Myofascial pain 10/23/2018    Sacroiliitis ([address] 10/23/2018    Lumbar radiculopathy 10/23/2018    Vitamin D deficiency 08/13/2018    Groin abscess     Postprocedural wound infection 05/22/2018    Atherosclerosis of lower extremity with claudication ([facility] 04/24/2018    Lumbar stenosis with neurogenic claudication 12/20/2017    Compression fracture of L4 lumbar vertebra ([facility]) 09/22/2017    S/P kyphoplasty 09/22/2017    Abnormal nuclear cardiac imaging test     SOB (shortness of breath) 04/25/2017    Fatigue 04/25/2017    Iron deficiency anemia 04/25/2017    History of amiodarone therapy 04/25/2017    Syncope     Carotid artery stenosis 04/21/2017    Chronic kidney disease (CKD), stage III (moderate) ([facility])     Atherosclerosis of native artery of both lower extremities with intermittent claudication ([facility]) 09/16/2016    NSVT (nonsustained ventricular tachycardia) ([facility])     S/P CABG x 6     Sustained VT (ventricular tachycardia) ([facility])     Ventricular tachyarrhythmia ([facility])     VT (ventricular tachycardia) ([facility] 04/27/2015    AICD (automatic cardioverter/defibrillator) present 04/27/2015    Hypercholesterolemia     Hypertension     COPD (chronic obstructive pulmonary disease) ([facility])     Anxiety     Depression      Current Outpatient Medications   Medication Sig Dispense Refill    DULoxetine (CYMBALTA) 60 MG extended release capsule Take 60 mg by mouth daily      amitriptyline (ELAVIL) 25 MG tablet Take 1 tablet by mouth nightly 30 tablet 2    Cream Base CREA [place] Pain Cream #4 Add Gabapentin 6% Apply 1-2 pumps to affected area 3-4 times a day 300 g 5    magnesium oxide (MAG-OX) 400 MG tablet Take 400 mg by mouth daily      Calcium Carb-Cholecalciferol (CALCIUM 600+D) 600-800 MG-UNIT TABS Take by mouth      Multiple Vitamins-Minerals (THERAPEUTIC MULTIVITAMIN-MINERALS) tablet Take 1 tablet by mouth daily      docusate sodium (COLACE) 100 MG capsule Take 1 capsule by mouth 2 times daily as needed for Constipation 30 capsule 1    ondansetron (ZOFRAN) 4 MG tablet Take 1 tablet by mouth every 8 hours as needed for Nausea or Vomiting 30 tablet 1    vitamin D (ERGOCALCIFEROL) 91137 units CAPS capsule Take 1 capsule by mouth once a week 30 capsule 3    Misc. Devices [facility]) MISC 1 each by Does not apply route daily 1 each 0    dexamethasone (DECADRON) 6 MG tablet Take 6 mg by mouth 2 times daily (with meals)      Pediatric Multivitamins-Fl (MULTI VIT/FL PO) Take by mouth      Fluticasone-Umeclidin-Vilant (TRELEGY ELLIPTA) 100-62.5-25 MCG/INH AEPB Inhale 1 puff into the lungs daily      OXYGEN Inhale 4 L into the lungs as needed      atorvastatin (LIPITOR) 40 MG tablet TAKE 1 TABLET DAILY 90 tablet 3    bumetanide (BUMEX) 1 MG tablet Take 1 tablet by mouth 2 times daily (Patient taking differently: Take 1 mg by mouth daily ) 180 tablet 1    amiodarone (CORDARONE) 200 MG tablet Take 1 tablet by mouth daily 90 tablet 3    isosorbide mononitrate (IMDUR) 60 MG extended release tablet Take 1 tablet by mouth daily 90 tablet 3    KLOR-CON M20 20 MEQ extended release tablet Take 20 mEq by mouth daily       albuterol (PROVENTIL) (2.5 MG/3ML) 0.083% nebulizer solution Take 2.5 mg by nebulization every 6 hours as needed for Wheezing       No current facility-administered medications for this visit. 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 Dori Jara CAD (coronary artery disease)     a. History of open-heart surgery X6, 01/12/06, utilizing the left VANCE to the LAD, sequential vein graft to the intermeditate and obtuse marginal, individually reverse saphenous vein graft to the acute maginal of the PDA and PLOM. b. Stress echocardiogram 04/22/08, negative for myocardial ischemia.     Carotid artery stenosis     Chronic kidney disease     Chronic kidney disease (CKD), stage III (moderate) ([facility])     COPD [facility] disease) [facility]     Depression     Gastric ulcer 11/06/2004    multiple, duodenal ulcer - Dr. Hayden Lovelace History of blood transfusion     Hypercholesterolemia     Hypertension     Thyroid disease     Ulcer 09/08/2010    Rectum.  Ventricular tachyarrhythmia [facility])      Past Surgical History:   Procedure Laterality Date    BACK SURGERY  08/17/2017    CARDIAC CATHETERIZATION  4/22/15  JDT    EF 50%    CARDIAC DEFIBRILLATOR PLACEMENT  04/24/2015    AICD    CARDIAC SURGERY  1/06    Open Heart Surgery (x6)    CATARACT REMOVAL  05    COLON SURGERY  6/26/04    Colon reversal (10/18/2004); Colon re-reversal (10/26/2004).  HERNIA REPAIR  10/26/2006    Ileostomy reversal and hernia repair.  KNEE SURGERY  1996    LAMINEC/FACETECT/FORAMIN,CERVICAL 1 SEG N/A 12/20/2017    L3-4 decompressive laminectomy using minimally invasive technique performed by Piotr Reid DO at [address] [place] [address]  11/2004    Bleeding ulcers.  OTHER SURGICAL HISTORY  02/14/2007    Debridement of abdominal/with pulse irrigation.  IA OFFICE/OUTPT VISIT,PROCEDURE ONLY N/A 8/20/2018    kyphoplasty of L5 performed by Piotr Reid DO at [facility] 11 BL VES DIRECT,LOW EXTREM Right 5/23/2018    RIGHT GROIN, DEBRIDEMENT AND MUSCLE FLAP  performed by Tristen Tyler MD at [facility] 60 EA IPSI VSL Right 4/24/2018    LOWER EXTREMITY ANGIOGRAM, BALLOON ANGIOPLASTY, ATHERECTOMY performed by Tristen Tyler MD at [address] Right 4/24/2018    COMMON FEMORAL ARTERY ENDARTERECTOMY performed by Tristen Tyler MD at [place] [address]  05/30/2005    RETINAL DETACHMENT SURGERY  06/21/2005    VASCULAR SURGERY  06/05/2017    [facility]. Left CFA 5f-6-f destination,aortogram with bilateral lower arteriogram,right SFA/Pop atherectomy CSI 1.25 solid,right SFA /pop balloon balloon angioplasty 6x150 mynx left CFA.     VASCULAR SURGERY Right 04/24/18 [facility]    Rigth common femoral/superficial femoral artery endarterectomy with right GSV patch, Left CFA 5F-6F sheath, RLE Angiogram, Right SFA/Popliteal balloon atherectomy with CSI 2.0 solid crown, Right Popliteal balloon angoplasty 5x150 lutonix, Right SFA Balloon Angioplasty 6x100 lutonix,    VASCULAR SURGERY  04/24/18 [facility]    1. Right common femoral/superficial femoral artery endarterectomy with (R) GSV Patch. 2. Left CFA 5f-6f sheath. 3. RLE A'gram. 4. (R) SFA/popliteal atherectomy with CSI 2.0 solid crown. 5.(R) popliteal balloon angioplasty 5x150 lutonix 6. (R)SFA Balloon a'plasty 6x250, 6x100 LUTONIX 7. Completion RLE A'gram 8. (L) CFA MYNX     Family History   Problem Relation Age of Onset    Hypertension Mother     Diabetes Father     Dementia Father     Cancer Father     Colon Cancer Father     Hypertension Father     Cancer Brother     Esophageal Cancer Sister      Social History     Tobacco Use    Smoking status: Former Smoker     Packs/day: 1.50     Years: 50.00     Pack years: 75.00     Types: Cigarettes     Last attempt to quit: 4/17/2015     Years since quitting: 3.9    Smokeless tobacco: Never Used   Substance Use Topics    Alcohol use: Yes     Alcohol/week: 0.6 oz     Types: 1 Cans of beer per week     Comment: weekly       Review of Systems    Constitutional - no significant activity change, appetite change, or unexpected weight change. No fever or chills. No diaphoresis or significant fatigue. HENT - no significant rhinorrhea or epistaxis. No tinnitus or significant hearing loss. Eyes - no sudden vision change or amaurosis. Respiratory - no significant shortness of breath, wheezing, or stridor. No apnea, cough, or chest tightness associated with shortness of breath. Cardiovascular - no chest pain, syncope, or significant dizziness. No palpitations or significant leg swelling. Patient reports no claudication.   Gastrointestinal - no abdominal swelling or pain. No blood in stool. No severe constipation, diarrhea, nausea, or vomiting. Genitourinary - No difficulty urinating, dysuria, frequency, or urgency. No flank pain or hematuria. Musculoskeletal - no back pain, gait disturbance, or myalgia. Skin - no color change, rash, pallor, or new wound. Neurologic - no dizziness, facial asymmetry, or light headedness. No seizures. No speech difficulty or lateralizing weakness. Hematologic - no easy bruising or excessive bleeding. Psychiatric - no severe anxiety or nervousness. No confusion. All other review of systems are negative. Physical Exam    BP (!) 152/63 Comment: left  Pulse 61   Temp 96.7 °F (35.9 °C)   Resp 18   SpO2 96%     Constitutional - well developed, well nourished. No diaphoresis or acute distress. HENT - head normocephalic. Right external ear canal appears normal.  Left external ear canal appears normal.  Septum appears midline. Eyes - conjunctiva normal.  EOMS normal.  No exudate. No icterus. Neck- ROM appears normal, no tracheal deviation. Cardiovascular - Regular rate and rhythm. Heart sounds are normal.  No murmur, rub, or gallop. Carotid pulses are 2+ to palpation bilaterally without bruit. Extremities - Radial and brachial pulses are 2+ to palpation bilaterally. Femoral pulses 2+. DP and PT pulses are not palpable. No cyanosis, clubbing, or significant edema. No signs atheroembolic event. Pulmonary - effort appears normal.  No respiratory distress. Lungs - Breath sounds normal. No wheezes or rales. GI - Abdomen - soft, non tender, bowel sounds X 4 quadrants. No guarding or rebound tenderness. No distension or palpable mass. Genitourinary - deferred. Musculoskeletal - ROM appears normal.  No significant edema. Neurologic - alert and oriented X 3. Physiologic. Skin - warm, dry, and intact. No rash, erythema, or pallor.   Psychiatric - mood, affect, and behavior appear normal.  Judgment and thought processes appear normal.    Risk factors for atherosclerosis of all vascular beds have been reviewed with the patient including:  Family history, tobacco abuse in all forms, elevated cholesterol, hyperlipidemia, and diabetes. Lower extremity arterial study: done on 4/2/19 at [facility] patient has moderately diminished ankle-brachial indices right lower    extremity(ies) which would be consistent with claudication level symptoms.    Based on segmental pressures and doppler waveforms, the patient likely has    disease in the right superficial femoral, arterial segments.    The patient has severely diminished ankle-brachial indices left lower    extremity(ies) which would be consistent with rest pain symptoms.    Based on segmental pressures and doppler waveforms, the patient likely has    disease in the left superficial femoral, tibial arterial segments.         Right RENAY 0.82, Left RENAY 0.49. Individual films reviewed: Yes. Test results were reviewed with the patient. Doppler results: done on 4/12/19 at [facility]    Right CCA/ICA 50-69% stenotic  Left CCA/ICA 50-69% stenotic  Right verterbral artery flow is antegrade  Left verterbral artery flow is antegrade  Individual velocities reviewed: Yes. Results were reviewed with the patient. Disease process is stable     Options were discussed with the patient including continued medical management versus proceeding with angiography and potential intervention for PVD with claudication. Risks have been discussed with the patient including but not limited to MI, death, CVA, bleed, nerve injury, infection, contrast nephropathy, possible need for dialysis, and need for further surgery. Patient declined A-gram at this time. Assessment    1. Bilateral carotid artery stenosis    2.  Atherosclerosis of lower extremity with claudication ([address]          Plan    Start ASA EC 81 mg daily  Strongly encourage he continue statin therapy  Good skin care/checks daily  Recommend no smoking     Follow up in 6 months with a JASON and CDU or sooner if claudication worsens, patient develops wound or IRP

## 2019-04-09 RX ORDER — EMOLLIENT BASE
CREAM (GRAM) TOPICAL
Qty: 300 G | Refills: 5
Start: 2019-04-09 | End: 2019-12-17 | Stop reason: SDUPTHER

## 2019-04-24 DIAGNOSIS — Z95.810 AICD (AUTOMATIC CARDIOVERTER/DEFIBRILLATOR) PRESENT: Primary | ICD-10-CM

## 2019-04-24 DIAGNOSIS — I47.20 VENTRICULAR TACHYARRHYTHMIA: ICD-10-CM

## 2019-04-24 PROCEDURE — 93296 REM INTERROG EVL PM/IDS: CPT | Performed by: CLINICAL NURSE SPECIALIST

## 2019-04-24 PROCEDURE — 93295 DEV INTERROG REMOTE 1/2/MLT: CPT | Performed by: CLINICAL NURSE SPECIALIST

## 2019-05-01 ENCOUNTER — HOSPITAL ENCOUNTER (OUTPATIENT)
Dept: PAIN MANAGEMENT | Age: 79
Discharge: HOME | End: 2019-05-01
Payer: MEDICARE

## 2019-05-01 VITALS
OXYGEN SATURATION: 90 % | HEART RATE: 68 BPM | DIASTOLIC BLOOD PRESSURE: 58 MMHG | RESPIRATION RATE: 20 BRPM | TEMPERATURE: 98.3 F | SYSTOLIC BLOOD PRESSURE: 136 MMHG

## 2019-05-01 PROCEDURE — 20553 NJX 1/MLT TRIGGER POINTS 3/>: CPT

## 2019-05-01 PROCEDURE — 2500000003 HC RX 250 WO HCPCS

## 2019-05-01 PROCEDURE — 20553 NJX 1/MLT TRIGGER POINTS 3/>: CPT | Performed by: NURSE PRACTITIONER

## 2019-05-01 RX ORDER — BUPIVACAINE HYDROCHLORIDE 5 MG/ML
INJECTION, SOLUTION EPIDURAL; INTRACAUDAL
Status: COMPLETED | OUTPATIENT
Start: 2019-05-01 | End: 2019-05-01

## 2019-05-01 RX ORDER — LIDOCAINE HYDROCHLORIDE 10 MG/ML
INJECTION, SOLUTION EPIDURAL; INFILTRATION; INTRACAUDAL; PERINEURAL
Status: COMPLETED | OUTPATIENT
Start: 2019-05-01 | End: 2019-05-01

## 2019-05-01 RX ADMIN — BUPIVACAINE HYDROCHLORIDE 20 ML: 5 INJECTION, SOLUTION EPIDURAL; INTRACAUDAL at 14:29

## 2019-05-01 RX ADMIN — LIDOCAINE HYDROCHLORIDE 20 ML: 10 INJECTION, SOLUTION EPIDURAL; INFILTRATION; INTRACAUDAL; PERINEURAL at 14:29

## 2019-05-01 ASSESSMENT — PAIN DESCRIPTION - PAIN TYPE: TYPE: CHRONIC PAIN

## 2019-05-01 ASSESSMENT — PAIN SCALES - GENERAL: PAINLEVEL_OUTOF10: 6

## 2019-05-01 NOTE — PROCEDURES
[facility]    Patient Name: Frank Basurto    : 1940                    Age: 67 y.o. Sex: male    Date: May 1, 2019    Pre-op Diagnosis: Myofascial Pain/ Muscle Spasms    Post-op Diagnosis: Myofascial Pain/ Muscle Spasms  Procedure: Piriformis Trigger Point Injections    Performing Procedure: KIEL Belle - BC, VA-BC    Previously Had Procedure:  [x] Yes   [] No    Patient Vitals for the past 24 hrs:   BP Temp Temp src Pulse Resp SpO2   19 1414 (!) 136/58 98.3 °F (36.8 °C) Temporal 68 20 90 %       Description of Procedure:    After a brief physical assessment and failure to improve with conservative measures the patient presented for Piriformis Trigger Point Injections The indications, limitations and possible complications were discussed with the patient and the patient elected to proceed with the procedure. After voluntary, informed and signed consent obtained the patient was placed in a  [x] Right Lateral Recumbent  [x] Left Lateral Recumbent      Appropriate time out was obtained per policy. The area of maximal tenderness was palpated over the  [] Right  [] Left  [x] Bilateral Lower  [x] Gluteus minimus  [x]  Gluteus Nicolas  [x] Piriformis. The skin overlying these areas was marked with a skin marker. The skin overlying the proposed injection sites were then sprayed with Gebauer's Solution  [] Yes   [x] No Then prepped in a sterile fashion with Chloro-Prep. Each trigger point of the [] Right  [] Left  [x] Bilateral Lower  [x] Gluteus minimus  [x]  Gluteus Nicolas  [x] Piriformis was then injected after negative aspiration was injected with approximately 2 ml of a solution of 5 ml of 1% Lidocaine Plain and 5 ml of 0.5% Marcaine Plain    [] with Decadron 0.5 ml (10mg/ml)  [] with Toradol 0.5 ml (15mg/ml)  (approximately 20 ml total) using a 25 gauge 1 1/2 inch needle. Sterile dressings applied. Discharge:   The patient tolerated the procedure well. There were no complications during the procedure and the patient was discharged home with discharge instructions. The patient has been instructed to contact the office should there be any complications or questions to arise between today and their next appointment.     Plan:  [x] Will return to the office in   [] 1 month  [] 4 - 6 weeks   [x] 2 months   [] 3 months for:  [] Planned Procedure  [x] Procedure Follow-up   [x] Office Visit

## 2019-05-06 ENCOUNTER — TELEPHONE (OUTPATIENT)
Dept: PAIN MANAGEMENT | Age: 79
End: 2019-05-06

## 2019-05-06 NOTE — TELEPHONE ENCOUNTER
Arlene  Follow up calls      1. How are you feeling since your injection? Doing great    2. Pain Score:  Prior- 5/6           Now-0    3. Do you feel like Evalina Pulling  got to the source of your pain? yes    4. Did it relieve at least 80% or more of your pain? %     5. Does the pain still go down one leg or arm or both legs or arms?

## 2019-05-07 ENCOUNTER — OFFICE VISIT (OUTPATIENT)
Dept: CARDIOLOGY | Age: 79
End: 2019-05-07
Payer: MEDICARE

## 2019-05-07 VITALS
HEART RATE: 70 BPM | WEIGHT: 178 LBS | BODY MASS INDEX: 26.36 KG/M2 | SYSTOLIC BLOOD PRESSURE: 122 MMHG | DIASTOLIC BLOOD PRESSURE: 60 MMHG | HEIGHT: 69 IN

## 2019-05-07 DIAGNOSIS — Z79.899 ON AMIODARONE THERAPY: Primary | ICD-10-CM

## 2019-05-07 DIAGNOSIS — Z95.810 AICD (AUTOMATIC CARDIOVERTER/DEFIBRILLATOR) PRESENT: ICD-10-CM

## 2019-05-07 DIAGNOSIS — I10 ESSENTIAL HYPERTENSION: ICD-10-CM

## 2019-05-07 DIAGNOSIS — I25.10 CORONARY ARTERY DISEASE INVOLVING NATIVE CORONARY ARTERY OF NATIVE HEART WITHOUT ANGINA PECTORIS: ICD-10-CM

## 2019-05-07 DIAGNOSIS — E78.00 HYPERCHOLESTEROLEMIA: ICD-10-CM

## 2019-05-07 PROCEDURE — 99212 OFFICE O/P EST SF 10 MIN: CPT | Performed by: INTERNAL MEDICINE

## 2019-05-07 PROCEDURE — 93282 PRGRMG EVAL IMPLANTABLE DFB: CPT | Performed by: INTERNAL MEDICINE

## 2019-05-07 RX ORDER — GABAPENTIN 100 MG/1
100 CAPSULE ORAL 2 TIMES DAILY PRN
COMMUNITY
End: 2020-05-13

## 2019-05-07 RX ORDER — MOXIFLOXACIN HYDROCHLORIDE 400 MG/1
400 TABLET ORAL DAILY
COMMUNITY
End: 2020-05-13

## 2019-05-07 RX ORDER — BUMETANIDE 1 MG/1
1 TABLET ORAL 2 TIMES DAILY
COMMUNITY
End: 2020-05-13

## 2019-05-07 NOTE — PROGRESS NOTES
ICD interrogated  Presenting rhythm: VS,  0.1 %  Battery status: 7.7 years  Lead status: lead impedance within range and stable  Sensing:  R waves 15.9 mV  Thresholds:  Ventricular 1.0 V @ 0.4ms  Observations: 2 monitored NSVT  Reprogramming for sensitivity and threshold testing  Next [facility] home check scheduled for 8/7/19

## 2019-05-07 NOTE — PROGRESS NOTES
[facility] Patient Office Visit    [facility]  [address]  Phone: [phone]  Fax: [phone]        5/7/2019    Chief Complaint / Reason for the Visit   Follow up of:  CAD and HTN and Hyperlipidemia      Specialty Problems        Cardiology Problems    CAD (coronary artery disease)        Hypercholesterolemia        Hypertension        VT (ventricular tachycardia) (HCC)        Ventricular tachyarrhythmia (HCC)        NSVT (nonsustained ventricular tachycardia) (HCC)        Sustained VT (ventricular tachycardia) ([address]        Atherosclerosis of native artery of both lower extremities with intermittent claudication ([address]        Carotid artery stenosis        Syncope        Atherosclerosis of lower extremity with claudication ([address]              Current Status Today According to the patient:  \"I'm fighting pneumonia right now\"    Subjective:  Mr. Parris Whitaker is generally feeling stable. Mr. Parris Whitaker has the following cardiac complaints / symptoms today:    1. CAD, Is stable on current medications     2.  HTN, Is stable on current medications     3. hyperlipidemia, Is stable on current medications         Parris Whitaker is a 67 y.o. male with the following history as recorded in EpicCare:    Patient Active Problem List    Diagnosis Date Noted    CAD (coronary artery disease)      Priority: High    Spasm of piriformis muscle 01/28/2019    Myofascial pain 10/23/2018    Sacroiliitis ([address] 10/23/2018    Lumbar radiculopathy 10/23/2018    Vitamin D deficiency 08/13/2018    Groin abscess     Postprocedural wound infection 05/22/2018    Atherosclerosis of lower extremity with claudication ([address] 04/24/2018    Lumbar stenosis with neurogenic claudication 12/20/2017    Compression fracture of L4 lumbar vertebra (HCC) 09/22/2017    S/P kyphoplasty 09/22/2017    Abnormal nuclear cardiac imaging test     SOB (shortness of breath) 04/25/2017    Fatigue 04/25/2017    Iron deficiency anemia 04/25/2017    History of amiodarone therapy 04/25/2017    Syncope     Carotid artery stenosis 04/21/2017    Chronic kidney disease (CKD), stage III (moderate) ([facility])     Atherosclerosis of native artery of both lower extremities with intermittent claudication ([facility]) 09/16/2016    NSVT (nonsustained ventricular tachycardia) ([facility])     S/P CABG x 6     Sustained VT (ventricular tachycardia) ([facility])     Ventricular tachyarrhythmia ([facility])     VT (ventricular tachycardia) ([facility] 04/27/2015    AICD (automatic cardioverter/defibrillator) present 04/27/2015    Hypercholesterolemia     Hypertension     COPD (chronic obstructive pulmonary disease) ([facility])     Anxiety     Depression      Current Outpatient Medications   Medication Sig Dispense Refill    bumetanide (BUMEX) 1 MG tablet Take 1 mg by mouth 2 times daily      gabapentin (NEURONTIN) 100 MG capsule Take 100 mg by mouth 2 times daily as needed.       moxifloxacin (AVELOX) 400 MG tablet Take 400 mg by mouth daily      Cream Base CREA [place] Pain Cream #4 Add Gabapentin 6% Apply 1-2 pumps to affected area 3-4 times a day 300 g 5    DULoxetine (CYMBALTA) 60 MG extended release capsule Take 60 mg by mouth daily      amitriptyline (ELAVIL) 25 MG tablet Take 1 tablet by mouth nightly 30 tablet 2    magnesium oxide (MAG-OX) 400 MG tablet Take 400 mg by mouth daily      Calcium Carb-Cholecalciferol (CALCIUM 600+D) 600-800 MG-UNIT TABS Take by mouth      Multiple Vitamins-Minerals (THERAPEUTIC MULTIVITAMIN-MINERALS) tablet Take 1 tablet by mouth daily      docusate sodium (COLACE) 100 MG capsule Take 1 capsule by mouth 2 times daily as needed for Constipation 30 capsule 1    ondansetron (ZOFRAN) 4 MG tablet Take 1 tablet by mouth every 8 hours as needed for Nausea or Vomiting 30 tablet 1    vitamin D (ERGOCALCIFEROL) 35109 units CAPS capsule Take 1 capsule by mouth once a week 30 capsule 3    Misc. Devices [facility]) MISC 1 each by Does not apply route daily 1 each 0    dexamethasone (DECADRON) 6 MG tablet Take 6 mg by mouth 2 times daily (with meals)      Pediatric Multivitamins-Fl (MULTI VIT/FL PO) Take by mouth      Fluticasone-Umeclidin-Vilant (TRELEGY ELLIPTA) 100-62.5-25 MCG/INH AEPB Inhale 1 puff into the lungs daily      OXYGEN Inhale 4 L into the lungs as needed      atorvastatin (LIPITOR) 40 MG tablet TAKE 1 TABLET DAILY 90 tablet 3    amiodarone (CORDARONE) 200 MG tablet Take 1 tablet by mouth daily 90 tablet 3    isosorbide mononitrate (IMDUR) 60 MG extended release tablet Take 1 tablet by mouth daily 90 tablet 3    KLOR-CON M20 20 MEQ extended release tablet Take 20 mEq by mouth daily       albuterol (PROVENTIL) (2.5 MG/3ML) 0.083% nebulizer solution Take 2.5 mg by nebulization every 6 hours as needed for Wheezing       No current facility-administered medications for this visit. 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 Airam Robles CAD (coronary artery disease)     a. History of open-heart surgery X6, 01/12/06, utilizing the left VANCE to the LAD, sequential vein graft to the intermeditate and obtuse marginal, individually reverse saphenous vein graft to the acute maginal of the PDA and PLOM. b. Stress echocardiogram 04/22/08, negative for myocardial ischemia.     Carotid artery stenosis     Chronic kidney disease     Chronic kidney disease (CKD), stage III (moderate) (HCC)     COPD (ch[facility] obstructive [facility] disease) [facility]     Depression     Gastric ulcer 11/06/2004    multiple, duodenal ulcer - Dr. Hidalgo Speaks History of blood transfusion     Hypercholesterolemia     Hypertension     Thyroid disease     Ulcer 09/08/2010    Rectum.  Ventricular tachyarrhythmia [facility])      Past Surgical History:   Procedure Laterality Date    BACK SURGERY  08/17/2017    CARDIAC CATHETERIZATION  4/22/15  JDT    EF 50%    CARDIAC DEFIBRILLATOR PLACEMENT  04/24/2015    AICD    CARDIAC SURGERY  1/06    Open Heart Surgery (x6)    CATARACT REMOVAL  05    COLON SURGERY  6/26/04    Colon reversal (10/18/2004); Colon re-reversal (10/26/2004).  HERNIA REPAIR  10/26/2006    Ileostomy reversal and hernia repair.  KNEE SURGERY  1996    LAMINEC/FACETECT/FORAMIN,CERVICAL 1 SEG N/A 12/20/2017    L3-4 decompressive laminectomy using minimally invasive technique performed by Lima Martinez DO at [place] [address]  11/2004    Bleeding ulcers.  OTHER SURGICAL HISTORY  02/14/2007    Debridement of abdominal/with pulse irrigation.  AK OFFICE/OUTPT VISIT,PROCEDURE ONLY N/A 8/20/2018    kyphoplasty of L5 performed by Lima Martinez DO at [facility] 11 BL VES DIRECT,LOW EXTREM Right 5/23/2018    RIGHT GROIN, DEBRIDEMENT AND MUSCLE FLAP  performed by Belia Jones MD at [facility] 60 EA IPSI VSL Right 4/24/2018    LOWER EXTREMITY ANGIOGRAM, BALLOON ANGIOPLASTY, ATHERECTOMY performed by Belia Jones MD at [address] Right 4/24/2018    COMMON FEMORAL ARTERY ENDARTERECTOMY performed by Belia Jones MD at [place] [address]  05/30/2005    RETINAL DETACHMENT SURGERY  06/21/2005    VASCULAR SURGERY  06/05/2017    SJS. Left CFA 5f-6-f destination,aortogram with bilateral lower arteriogram,right SFA/Pop atherectomy CSI 1.25 solid,right SFA /pop balloon balloon angioplasty 6x150 mynx left CFA.     VASCULAR SURGERY Right 04/24/18 SJS    Rigth common femoral/superficial femoral artery endarterectomy with right GSV patch, Left CFA 5F-6F sheath, RLE Angiogram, Right SFA/Popliteal balloon atherectomy with CSI 2.0 solid crown, Right Popliteal balloon angoplasty 5x150 lutonix, Right SFA Balloon Angioplasty 6x100 lutonix,    VASCULAR SURGERY  18 SJS    1. Right common femoral/superficial femoral artery endarterectomy with (R) GSV Patch. 2. Left CFA 5f-6f sheath. 3. RLE A'gram. 4. (R) SFA/popliteal atherectomy with CSI 2.0 solid crown. 5.(R) popliteal balloon angioplasty 5x150 lutonix 6. (R)SFA Balloon a'plasty 6x250, 6x100 LUTONIX 7. Completion RLE A'gram 8. (L) CFA MYNX     Family History   Problem Relation Age of Onset    Hypertension Mother     Diabetes Father     Dementia Father     Cancer Father     Colon Cancer Father     Hypertension Father     Cancer Brother     Esophageal Cancer Sister      Social History     Tobacco Use    Smoking status: Former Smoker     Packs/day: 1.50     Years: 50.00     Pack years: 75.00     Types: Cigarettes     Last attempt to quit: 2015     Years since quittin.0    Smokeless tobacco: Never Used   Substance Use Topics    Alcohol use: Yes     Alcohol/week: 0.6 oz     Types: 1 Cans of beer per week     Comment: weekly          Review of Systems:    General:      Complaint / Symptom Yes / No / Description if Yes       Fatigue No   Weight gain N/A   Insomnia N/A       Respiratory:        Complaint / Symptom Yes / No / Description if Yes       Cough No   Horseness N/A       Cardiovascular:    Complaint / Symptom Yes / No / Description if Yes       Chest Pain No   Shortness of Air / Orthopnea No   Presyncope / Syncope No   Palpitations No         Objective:    /60   Pulse 70   Ht 5' 8.5\" (1.74 m)   Wt 178 lb (80.7 kg)   BMI 26.67 kg/m²     GENERAL - well developed and well nourished, conversant  HEENT -  PERRLA, Hearing appears normal  NECK - no thyromegaly, no JVD, trachea is in the midline  CARDIOVASCULAR - PMI is in the mid line clavicular position, Normal S1 and S2 with a grade 1/6 systolic murmur.   No S3 or S4    PULMONARY - no respiratory distress. No wheezes or rales. Lungs are clear to ausculation   ABDOMEN  - soft, non tender, no rebound  MUSCULOSKELETAL  - range of motion of the upper and lower extermites appears normal and equal and is without pain   EXTREMITIES - no significant edema   NEUROLOGIC - gait and station are normal  SKIN - turgor is normal  PSYCHIATRIC - normal mood and affect, alert and orientated x 3,      ASSESSMENT:    ALL THE CARDIOLOGY PROBLEMS ARE LISTED ABOVE; HOWEVER, THE FOLLOWING SPECIFIC CARDIAC PROBLEMS / CONDITIONS WERE ADDRESSED AND TREATED DURING THE OFFICE VISIT TODAY:                                                                                            MEDICAL DECISION MAKING             Cardiac Specific Problem / Diagnosis  Discussion and Data Reviewed Diagnostic Procedures Ordered Management Options Selected           1. Hyperlipidemia  show no change   Managed by Dr. Julia Zapata No Continue current medications:     Yes           2. CAD  show no change   Review and summation of old records:    01/12/06 CABG x 6  LIMA-LAD, sequential VG-RI & OM, VG-acute maginal, PDA and PLOM [facility], [facility])  4/22/2008  SE  negative for myocardial ischemia  1/9/2012  lexiscan negative for myocardial ischemia, EF 53%  04/22/2015  Symptomatic paroxymal VT (longest 17 beats at 202 bpm, on metroprolol) on  Holter  04/23/2015  Cath  Patent bypass conduits, normal LVFX, no AI, abdominal aortogram ok  04/24/2015  Single lead ICDts,  3/10/16  VT storm  5/5/2017  lexiscan Positive for inferior myocardial ischemia, EF 65%, intermediate risk findings, AUC indication 16, AUC score 7  5/15/17  Cath  Patent LIMA-LAD, patent VG-OM, patent VG-RCA, normal LVFX    Patient is doing well from cardiac standpoint, he is fighting pneumonia at this time. Patient needs amiodarone work up but will hold off until patient gets over pneumonia No Continue current medications:    {Yes           3.  HTN  show no change   Patient has a history of these risk factors, which are managed medically, and are on current oral therapy I personally addressed, counselled and educated the patient on this problem / risk factor. I will personally continue and manage prescribed medications and monitor the course of the therapy. No Continue current medications:       Yes         Discussed with patient and spouse. Follow Up Visit Scheduled for:  6 month(s)    I greatly appreciate the opportunity to meet Brianna Vance and your confidence in allowing me to participate in his cardiovascular care. [facility] [address] [place] [address] am scribing for and in the presence of JOHAN Metcalf MD,FAC. Manoj Chavez MA    5/7/19 11:51 AM  IJOHAN MD, [facility], personally performed the services described in this documentation as scribed by Manoj Chavez in my presence, and it is both accurate and complete. Electronically signed by Leslie Lujan.  Ayanna Metcalf MD, [facility]    5/7/19 11:54 AM

## 2019-07-03 ENCOUNTER — TELEPHONE (OUTPATIENT)
Dept: CARDIOLOGY | Age: 79
End: 2019-07-03

## 2019-08-07 DIAGNOSIS — I47.29 NSVT (NONSUSTAINED VENTRICULAR TACHYCARDIA): ICD-10-CM

## 2019-08-07 DIAGNOSIS — Z95.810 AICD (AUTOMATIC CARDIOVERTER/DEFIBRILLATOR) PRESENT: Primary | ICD-10-CM

## 2019-08-07 PROCEDURE — 93296 REM INTERROG EVL PM/IDS: CPT | Performed by: CLINICAL NURSE SPECIALIST

## 2019-08-07 PROCEDURE — 93295 DEV INTERROG REMOTE 1/2/MLT: CPT | Performed by: CLINICAL NURSE SPECIALIST

## 2019-11-07 DIAGNOSIS — I47.29 NSVT (NONSUSTAINED VENTRICULAR TACHYCARDIA): ICD-10-CM

## 2019-11-07 DIAGNOSIS — Z95.810 AICD (AUTOMATIC CARDIOVERTER/DEFIBRILLATOR) PRESENT: Primary | ICD-10-CM

## 2019-11-07 PROCEDURE — 93295 DEV INTERROG REMOTE 1/2/MLT: CPT | Performed by: CLINICAL NURSE SPECIALIST

## 2019-11-07 PROCEDURE — 93296 REM INTERROG EVL PM/IDS: CPT | Performed by: CLINICAL NURSE SPECIALIST

## 2019-12-18 RX ORDER — EMOLLIENT BASE
CREAM (GRAM) TOPICAL
Qty: 300 G | Refills: 5 | Status: SHIPPED | OUTPATIENT
Start: 2019-12-18 | End: 2020-05-13

## 2020-02-07 PROCEDURE — 93295 DEV INTERROG REMOTE 1/2/MLT: CPT | Performed by: CLINICAL NURSE SPECIALIST

## 2020-02-07 PROCEDURE — 93296 REM INTERROG EVL PM/IDS: CPT | Performed by: CLINICAL NURSE SPECIALIST

## 2020-05-05 ENCOUNTER — HOSPITAL ENCOUNTER (OUTPATIENT)
Dept: NON INVASIVE DIAGNOSTICS | Age: 80
Discharge: HOME | End: 2020-05-05
Payer: MEDICARE

## 2020-05-05 ENCOUNTER — OFFICE VISIT (OUTPATIENT)
Dept: VASCULAR SURGERY | Age: 80
End: 2020-05-05
Payer: MEDICARE

## 2020-05-05 VITALS
SYSTOLIC BLOOD PRESSURE: 144 MMHG | HEART RATE: 69 BPM | HEIGHT: 70 IN | OXYGEN SATURATION: 94 % | BODY MASS INDEX: 24.34 KG/M2 | RESPIRATION RATE: 20 BRPM | TEMPERATURE: 98.2 F | WEIGHT: 170 LBS | DIASTOLIC BLOOD PRESSURE: 66 MMHG

## 2020-05-05 PROCEDURE — 99213 OFFICE O/P EST LOW 20 MIN: CPT | Performed by: PHYSICIAN ASSISTANT

## 2020-05-05 PROCEDURE — 93923 UPR/LXTR ART STDY 3+ LVLS: CPT

## 2020-05-05 RX ORDER — TRAZODONE HYDROCHLORIDE 50 MG/1
50 TABLET ORAL DAILY
COMMUNITY

## 2020-05-05 RX ORDER — GUAIFENESIN 600 MG/1
600 TABLET, EXTENDED RELEASE ORAL 2 TIMES DAILY
COMMUNITY

## 2020-05-05 RX ORDER — AZITHROMYCIN 250 MG/1
250 TABLET, FILM COATED ORAL EVERY OTHER DAY
COMMUNITY
End: 2020-05-13

## 2020-05-05 RX ORDER — GINSENG 100 MG
CAPSULE ORAL
Qty: 14 G | Refills: 1 | Status: SHIPPED | OUTPATIENT
Start: 2020-05-05 | End: 2020-05-15

## 2020-05-05 RX ORDER — CEPHALEXIN 250 MG/1
250 CAPSULE ORAL 2 TIMES DAILY
Qty: 20 CAPSULE | Refills: 0 | Status: SHIPPED | OUTPATIENT
Start: 2020-05-05 | End: 2020-05-13

## 2020-05-05 RX ORDER — METOLAZONE 2.5 MG/1
2.5 TABLET ORAL EVERY OTHER DAY
COMMUNITY
End: 2020-05-13

## 2020-05-05 RX ORDER — PNV NO.95/FERROUS FUM/FOLIC AC 28MG-0.8MG
TABLET ORAL DAILY
COMMUNITY

## 2020-05-05 RX ORDER — TIOTROPIUM BROMIDE AND OLODATEROL 3.124; 2.736 UG/1; UG/1
SPRAY, METERED RESPIRATORY (INHALATION)
COMMUNITY

## 2020-05-05 NOTE — PROGRESS NOTES
piriformis muscle 01/28/2019    Myofascial pain 10/23/2018    Sacroiliitis ([facility] 10/23/2018    Lumbar radiculopathy 10/23/2018    Vitamin D deficiency 08/13/2018    Groin abscess     Postprocedural wound infection 05/22/2018    Atherosclerosis of lower extremity with claudication ([facility] 04/24/2018    Lumbar stenosis with neurogenic claudication 12/20/2017    Compression fracture of L4 lumbar vertebra 09/22/2017     Replacing deprecated diagnoses      S/P kyphoplasty 09/22/2017    Abnormal nuclear cardiac imaging test     SOB (shortness of breath) 04/25/2017    Fatigue 04/25/2017    Iron deficiency anemia 04/25/2017    History of amiodarone therapy 04/25/2017    Syncope     Carotid artery stenosis 04/21/2017    Chronic kidney disease (CKD), stage III (moderate) ([facility])     Atherosclerosis of native artery of both lower extremities with intermittent claudication ([facility]) 09/16/2016    NSVT (nonsustained ventricular tachycardia) ([facility])     S/P CABG x 6     Sustained VT (ventricular tachycardia) ([facility])     Ventricular tachyarrhythmia ([facility])     VT (ventricular tachycardia) ([facility] 04/27/2015    AICD (automatic cardioverter/defibrillator) present 04/27/2015    Hypercholesterolemia     Hypertension     COPD (chronic obstructive pulmonary disease) ([facility])     Anxiety     Depression      Current Outpatient Medications   Medication Sig Dispense Refill    metOLazone (ZAROXOLYN) 2.5 MG tablet Take 2.5 mg by mouth every other day      traZODone (DESYREL) 50 MG tablet Take 50 mg by mouth daily      Tiotropium Bromide-Olodaterol (STIOLTO RESPIMAT) 2.5-2.5 MCG/ACT AERS Inhale into the lungs 2 puffs daily      azithromycin (ZITHROMAX) 250 MG tablet Take 250 mg by mouth every other day      Ferrous Sulfate (IRON) 325 (65 Fe) MG TABS Take by mouth daily      guaiFENesin (MUCINEX) 600 MG extended release tablet Take 1,200 mg by mouth 2 times daily      cephALEXin (KEFLEX) 250 MG capsule Take 1 capsule by mouth SpO2 94%   BMI 24.39 kg/m²     Constitutional - well developed, well nourished. No diaphoresis or acute distress. HENT - head normocephalic. Right external ear canal appears normal. Left external ear canal appears normal.  Septum appears midline. Eyes - conjunctiva normal.  EOMS normal.  No exudate. No icterus. Neck- ROM appears normal, no tracheal deviation. Cardiovascular - Regular rate and rhythm. Heart sounds are normal.  No murmur, rub, or gallop. Carotid pulses are 2+ to palpation bilaterally without bruit. Extremities - Radial and ulnar pulses are 2+ to palpation bilaterally. Intertrigo groins, right > than the left. DP and PT pulses are not palpable. He has some mild RLE edema. He has epidermal slough noted right 1st - 5th toes with erythema noted from toes to mid shin. No drainage or foul odor noted. Pulmonary - effort appears normal.  No respiratory distress. Lungs - Breath sounds normal. No wheezes or rales. GI - Abdomen - soft, non tender, bowel sounds X 4 quadrants. No guarding or rebound tenderness. No distension or palpable mass. Genitourinary - deferred. Musculoskeletal - ROM appears normal.  No significant edema. Neurologic - alert and oriented X 3. Physiologic. Skin - warm, dry, and intact. No rash, erythema, or pallor. Psychiatric - mood, affect, and behavior appear normal.  Judgment and thought processes appear normal.    Risk factors for atherosclerosis of all vascular beds have been reviewed with the patient including:  Family history, tobacco abuse in all forms, elevated cholesterol, hyperlipidemia, and diabetes. Assessment      1.  Atherosclerosis of native artery of both lower extremities with bilateral ulceration of other part of feet ([address]          Plan      Strongly encourage he continue statin therapy  Recommend no smoking  Wash toes daily with dial soap and water, pat dry and apply Bacitracin to wounds daily  We will start him on Keflex 250 mg BID x 10

## 2020-05-06 ENCOUNTER — TELEPHONE (OUTPATIENT)
Dept: VASCULAR SURGERY | Age: 80
End: 2020-05-06

## 2020-05-06 ENCOUNTER — PREP FOR PROCEDURE (OUTPATIENT)
Dept: VASCULAR SURGERY | Age: 80
End: 2020-05-06

## 2020-05-06 PROBLEM — I70.234 ATHEROSCLEROSIS OF NATIVE ARTERY OF RIGHT LOWER EXTREMITY WITH ULCERATION OF MIDFOOT (HCC): Status: ACTIVE | Noted: 2020-05-06

## 2020-05-06 PROBLEM — I70.203 ATHEROSCLEROSIS OF NATIVE ARTERY OF BOTH LOWER EXTREMITIES (HCC): Status: ACTIVE | Noted: 2020-05-06

## 2020-05-06 PROBLEM — I70.244 ATHEROSCLEROSIS OF NATIVE ARTERY OF LEFT LOWER EXTREMITY WITH ULCERATION OF MIDFOOT (HCC): Status: ACTIVE | Noted: 2020-05-06

## 2020-05-06 PROBLEM — L97.511: Status: ACTIVE | Noted: 2020-05-06

## 2020-05-06 RX ORDER — ACETYLCYSTEINE 100 MG/ML
SOLUTION ORAL; RESPIRATORY (INHALATION)
Qty: 1 ML | Refills: 0 | OUTPATIENT
Start: 2020-05-06 | End: 2020-05-21 | Stop reason: ALTCHOICE

## 2020-05-06 RX ORDER — SODIUM CHLORIDE 9 MG/ML
INJECTION, SOLUTION INTRAVENOUS CONTINUOUS
Status: CANCELLED | OUTPATIENT
Start: 2020-05-06

## 2020-05-06 RX ORDER — ASPIRIN 81 MG/1
81 TABLET ORAL ONCE
Status: CANCELLED | OUTPATIENT
Start: 2020-05-06 | End: 2020-05-06

## 2020-05-06 RX ORDER — SODIUM CHLORIDE 0.9 % (FLUSH) 0.9 %
10 SYRINGE (ML) INJECTION PRN
Status: CANCELLED | OUTPATIENT
Start: 2020-05-06

## 2020-05-06 NOTE — H&P (VIEW-ONLY)
Patient Care Team:  Willi Slater DO as PCP - General (Family Medicine)  Yohana Nguyen MD (Cardiology)  Nga Zarco MD as Consulting Physician (Vascular Surgery)  Mary Samano DO as Consulting Physician (Neurosurgery)  KANCHAN Spears as Nurse Practitioner (Family Nurse Practitioner)      History and Physical  Mr. Eduarda Maldonado is a 80 yo male who has a history of HTN, hyperlipidemia, stage III CKD, CAD, carotid artery stenosis, DM, past tobacco abuse  and PVD. He comes in today for evaluation of wounds right toes. He reports that these are painful (burn and sting). He denies any fever. He was seen at [facility] for evaluation of his RLE. He was diagnosed with cellulitis and given Doxycycline. He had a RLE venous scan done that was negative for DVT. He reports that he had been admitted to [facility] in January for Fluid overload and the wounds were present at that time. His current medication include statin therapy daily. He is not walking and uses a motorized scooter to get around. Old records have been obtained, reviewed, and summarized.     Sergio Lovelace is a 79 y.o. male with the following history reviewed and recorded in [facility]:  Patient Active Problem List    Diagnosis Date Noted    CAD (coronary artery disease)      Priority: High     01/12/06 CABG x 6  LIMA-LAD, sequential VG-RI & OM, VG-acute maginal, PDA and PLOM Velma Loi, [address])  4/22/2008  SE  negative for myocardial ischemia  1/9/2012  lexiscan negative for myocardial ischemia, EF 53%  04/22/2015  Symptomatic paroxymal VT (longest 17 beats at 202 bpm, on metroprolol) on  Holter  04/23/2015  Cath  Patent bypass conduits, normal LVFX, no AI, abdominal aortogram ok  04/24/2015  Single lead ICDts,  3/10/16  VT storm  5/5/2017  lexiscan Positive for inferior myocardial ischemia, EF 65%, intermediate risk findings, AUC indication 16, AUC score 7  5/15/17  Cath  Patent LIMA-LAD, patent VG-OM, patent VG-RCA, normal LVFX                   Spasm of piriformis muscle 01/28/2019    Myofascial pain 10/23/2018    Sacroiliitis ([facility] 10/23/2018    Lumbar radiculopathy 10/23/2018    Vitamin D deficiency 08/13/2018    Groin abscess     Postprocedural wound infection 05/22/2018    Atherosclerosis of lower extremity with claudication ([facility] 04/24/2018    Lumbar stenosis with neurogenic claudication 12/20/2017    Compression fracture of L4 lumbar vertebra 09/22/2017     Replacing deprecated diagnoses      S/P kyphoplasty 09/22/2017    Abnormal nuclear cardiac imaging test     SOB (shortness of breath) 04/25/2017    Fatigue 04/25/2017    Iron deficiency anemia 04/25/2017    History of amiodarone therapy 04/25/2017    Syncope     Carotid artery stenosis 04/21/2017    Chronic kidney disease (CKD), stage III (moderate) ([facility])     Atherosclerosis of native artery of both lower extremities with intermittent claudication ([facility]) 09/16/2016    NSVT (nonsustained ventricular tachycardia) ([facility])     S/P CABG x 6     Sustained VT (ventricular tachycardia) ([facility])     Ventricular tachyarrhythmia ([facility])     VT (ventricular tachycardia) ([facility] 04/27/2015    AICD (automatic cardioverter/defibrillator) present 04/27/2015    Hypercholesterolemia     Hypertension     COPD (chronic obstructive pulmonary disease) ([facility])     Anxiety     Depression      Current Outpatient Medications   Medication Sig Dispense Refill    metOLazone (ZAROXOLYN) 2.5 MG tablet Take 2.5 mg by mouth every other day      traZODone (DESYREL) 50 MG tablet Take 50 mg by mouth daily      Tiotropium Bromide-Olodaterol (STIOLTO RESPIMAT) 2.5-2.5 MCG/ACT AERS Inhale into the lungs 2 puffs daily      azithromycin (ZITHROMAX) 250 MG tablet Take 250 mg by mouth every other day      Ferrous Sulfate (IRON) 325 (65 Fe) MG TABS Take by mouth daily      guaiFENesin (MUCINEX) 600 MG extended release tablet Take 1,200 mg by mouth 2 times daily      cephALEXin (KEFLEX) 250 MG capsule Take 1 capsule by mouth Reported on 5/5/2020) 30 capsule 1    dexamethasone (DECADRON) 6 MG tablet Take 6 mg by mouth 2 times daily (with meals)      Fluticasone-Umeclidin-Vilant (TRELEGY ELLIPTA) 100-62.5-25 MCG/INH AEPB Inhale 1 puff into the lungs daily       No current facility-administered medications for this visit. 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 Unruly Arnold CAD (coronary artery disease)     a. History of open-heart surgery X6, 01/12/06, utilizing the left VANCE to the LAD, sequential vein graft to the intermeditate and obtuse marginal, individually reverse saphenous vein graft to the acute maginal of the PDA and PLOM. b. Stress echocardiogram 04/22/08, negative for myocardial ischemia.  Carotid artery stenosis     Chronic kidney disease     Chronic kidney disease (CKD), stage III (moderate) (HCC)     COPD (chronic obstructive pulmonary disease) [facility]     Depression     Gastric ulcer 11/06/2004    multiple, duodenal ulcer - Dr. Peng Coleman History of blood transfusion     Hypercholesterolemia     Hypertension     Thyroid disease     Ulcer 09/08/2010    Rectum.  Ventricular tachyarrhythmia [facility])      Past Surgical History:   Procedure Laterality Date    BACK SURGERY  08/17/2017    CARDIAC CATHETERIZATION  4/22/15  JDT    EF 50%    CARDIAC DEFIBRILLATOR PLACEMENT  04/24/2015    AICD    CARDIAC SURGERY  1/06    Open Heart Surgery (x6)    CATARACT REMOVAL  05    COLON SURGERY  6/26/04    Colon reversal (10/18/2004); Colon re-reversal (10/26/2004).  HERNIA REPAIR  10/26/2006    Ileostomy reversal and hernia repair.     KNEE SURGERY  1996    LAMINEC/FACETECT/FORAMIN,CERVICAL 1 SEG N/A 12/20/2017 SpO2 94%   BMI 24.39 kg/m²     Constitutional - well developed, well nourished. No diaphoresis or acute distress. HENT - head normocephalic. Right external ear canal appears normal. Left external ear canal appears normal.  Septum appears midline. Eyes - conjunctiva normal.  EOMS normal.  No exudate. No icterus. Neck- ROM appears normal, no tracheal deviation. Cardiovascular - Regular rate and rhythm. Heart sounds are normal.  No murmur, rub, or gallop. Carotid pulses are 2+ to palpation bilaterally without bruit. Extremities - Radial and ulnar pulses are 2+ to palpation bilaterally. Intertrigo groins, right > than the left. DP and PT pulses are not palpable. He has some mild RLE edema. He has epidermal slough noted right 1st - 5th toes with erythema noted from toes to mid shin. No drainage or foul odor noted. Pulmonary - effort appears normal.  No respiratory distress. Lungs - Breath sounds normal. No wheezes or rales. GI - Abdomen - soft, non tender, bowel sounds X 4 quadrants. No guarding or rebound tenderness. No distension or palpable mass. Genitourinary - deferred. Musculoskeletal - ROM appears normal.  No significant edema. Neurologic - alert and oriented X 3. Physiologic. Skin - warm, dry, and intact. No rash, erythema, or pallor. Psychiatric - mood, affect, and behavior appear normal.  Judgment and thought processes appear normal.    Risk factors for atherosclerosis of all vascular beds have been reviewed with the patient including:  Family history, tobacco abuse in all forms, elevated cholesterol, hyperlipidemia, and diabetes. Assessment      1.  Atherosclerosis of native artery of both lower extremities with bilateral ulceration of other part of feet ([address]          Plan      Strongly encourage he continue statin therapy  Recommend no smoking  Wash toes daily with dial soap and water, pat dry and apply Bacitracin to wounds daily  We will start him on Keflex 250 mg BID x 10

## 2020-05-06 NOTE — H&P
Patient Care Team:  Avery Carbajal DO as PCP - General (Family Medicine)  Juana Bonilla MD (Cardiology)  Radha Chavarria MD as Consulting Physician (Vascular Surgery)  Kathi Tejada DO as Consulting Physician (Neurosurgery)  KANCHAN Oconnell as Nurse Practitioner (Family Nurse Practitioner)      History and Physical  Mr. Amol Salas is a 78 yo male who has a history of HTN, hyperlipidemia, stage III CKD, CAD, carotid artery stenosis, DM, past tobacco abuse  and PVD. He comes in today for evaluation of wounds right toes. He reports that these are painful (burn and sting). He denies any fever. He was seen at [place] [address] for evaluation of his RLE. He was diagnosed with cellulitis and given Doxycycline. He had a RLE venous scan done that was negative for DVT. He reports that he had been admitted to [place] [address] in January for Fluid overload and the wounds were present at that time. His current medication include statin therapy daily. He is not walking and uses a motorized scooter to get around. Old records have been obtained, reviewed, and summarized.     Lacy Booth is a 79 y.o. male with the following history reviewed and recorded in [facility]:  Patient Active Problem List    Diagnosis Date Noted    CAD (coronary artery disease)      Priority: High     01/12/06 CABG x 6  LIMA-LAD, sequential VG-RI & OM, VG-acute maginal, PDA and PLOM Marine Vivas, [address] [facility] )  4/22/2008  SE  negative for myocardial ischemia  1/9/2012  lexiscan negative for myocardial ischemia, EF 53%  04/22/2015  Symptomatic paroxymal VT (longest 17 beats at 202 bpm, on metroprolol) on  Holter  04/23/2015  Cath  Patent bypass conduits, normal LVFX, no AI, abdominal aortogram ok  04/24/2015  Single lead ICDts,  3/10/16  VT storm  5/5/2017  lexiscan Positive for inferior myocardial ischemia, EF 65%, intermediate risk findings, AUC indication 16, AUC score 7  5/15/17  Cath  Patent LIMA-LAD, patent VG-OM, patent VG-RCA, normal LVFX                   Spasm of Reported on 5/5/2020) 30 capsule 1    dexamethasone (DECADRON) 6 MG tablet Take 6 mg by mouth 2 times daily (with meals)      Fluticasone-Umeclidin-Vilant (TRELEGY ELLIPTA) 100-62.5-25 MCG/INH AEPB Inhale 1 puff into the lungs daily       No current facility-administered medications for this visit. 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 Del Barajas CAD (coronary artery disease)     a. History of open-heart surgery X6, 01/12/06, utilizing the left VANCE to the LAD, sequential vein graft to the intermeditate and obtuse marginal, individually reverse saphenous vein graft to the acute maginal of the PDA and PLOM. b. Stress echocardiogram 04/22/08, negative for myocardial ischemia.  Carotid artery stenosis     Chronic kidney disease     Chronic kidney disease (CKD), stage III (moderate) (HCC)     COPD (chronic obstructive pulmonary disease) ([facility]     Depression     Gastric ulcer 11/06/2004    multiple, duodenal ulcer - Dr. Brinda Yee History of blood transfusion     Hypercholesterolemia     Hypertension     Thyroid disease     Ulcer 09/08/2010    Rectum.  Ventricular tachyarrhythmia [facility])      Past Surgical History:   Procedure Laterality Date    BACK SURGERY  08/17/2017    CARDIAC CATHETERIZATION  4/22/15  JDT    EF 50%    CARDIAC DEFIBRILLATOR PLACEMENT  04/24/2015    AICD    CARDIAC SURGERY  1/06    Open Heart Surgery (x6)    CATARACT REMOVAL  05    COLON SURGERY  6/26/04    Colon reversal (10/18/2004); Colon re-reversal (10/26/2004).  HERNIA REPAIR  10/26/2006    Ileostomy reversal and hernia repair.     KNEE SURGERY  1996    LAMINEC/FACETECT/FORAMIN,CERVICAL 1 SEG N/A 12/20/2017 Father     Colon Cancer Father     Hypertension Father     Cancer Brother     Esophageal Cancer Sister      Social History     Tobacco Use    Smoking status: Former Smoker     Packs/day: 1.50     Years: 50.00     Pack years: 75.00     Types: Cigarettes     Last attempt to quit: 2015     Years since quittin.0    Smokeless tobacco: Never Used   Substance Use Topics    Alcohol use: Yes     Alcohol/week: 1.0 standard drinks     Types: 1 Cans of beer per week     Comment: weekly       Review of Systems    Constitutional - no significant activity change, appetite change, or unexpected weight change. No fever or chills. No diaphoresis or significant fatigue. HENT - no significant rhinorrhea or epistaxis. No tinnitus or significant hearing loss. Eyes - no sudden vision change or amaurosis. Respiratory - no significant shortness of breath, wheezing, or stridor. No apnea, cough, or chest tightness associated with shortness of breath. Cardiovascular - no chest pain, syncope, or significant dizziness. No palpitations. He has some mild right leg swelling. (see HPI)  Gastrointestinal - no abdominal swelling or pain. No blood in stool. No severe constipation, diarrhea, nausea, or vomiting. Genitourinary - No difficulty urinating, dysuria, frequency, or urgency. No flank pain or hematuria. Musculoskeletal - no back pain, gait disturbance, or myalgia. Skin - no color change, rash, pallor. Wounds right toes. Neurologic - no dizziness, facial asymmetry, or light headedness. No seizures. No speech difficulty or lateralizing weakness. Hematologic - no easy bruising or excessive bleeding. Psychiatric - no severe anxiety or nervousness. No confusion. All other review of systems are negative.       Physical Exam    BP (!) 144/66 (Site: Left Upper Arm, Position: Sitting, Cuff Size: Medium Adult)   Pulse 69   Temp 98.2 °F (36.8 °C) (Temporal)   Resp 20   Ht 5' 10\" (1.778 m)   Wt 170 lb (77.1 kg) days  We will attempt to get the study result he had on his right leg done at [facility] (RLE venou scan obtianed, negative for DVT)  We will obtain a JASON today and call him with the results      Addendum: 5/6/2020  JASON -   Impression        The patient has severely diminished ankle-brachial indices bilateral lower    extremityies which would be consistent with rest pain symptoms.    Based on segmental pressures and doppler waveforms, the patient likely has    disease in the bilateral iliofemoral, superficial femoral, and tibial    arterial segments.        Signature        ----------------------------------------------------------------    Electronically signed by Jolie Dorsey MD(Interpreting    physician) on 05/06/2020 07:40 AM    ----------------------------------------------------------------       Velocities are measured in cm/s ; Diameters are measured in mm       Pressures   +--------------------------------------++--------+-----+----+--------+-----+   !                                      ! ! Right   !     !Left!        !     !   +--------------------------------------++--------+-----+----+--------+-----+   ! Location                              ! ! Pressure! Ratio!    !Pressure! Ratio! +--------------------------------------++--------+-----+----+--------+-----+   ! Upper Thigh                           !!152     !0.81 !    !180     !0.96 !   +--------------------------------------++--------+-----+----+--------+-----+   ! Lower Thigh                           !!80      !0.46 !    !128     !0.68 !   +--------------------------------------++--------+-----+----+--------+-----+   ! Calf                                  !!85      !0.45 !    !107     !0.57 !   +--------------------------------------++--------+-----+----+--------+-----+   ! Ankle PT                              !!44      !0.23 !   Thao      !0.41 !   +--------------------------------------++--------+-----+----+--------+-----+   ! DP

## 2020-05-07 ENCOUNTER — TELEPHONE (OUTPATIENT)
Dept: VASCULAR SURGERY | Facility: CLINIC | Age: 80
End: 2020-05-07

## 2020-05-07 ENCOUNTER — TELEPHONE (OUTPATIENT)
Dept: VASCULAR SURGERY | Age: 80
End: 2020-05-07

## 2020-05-07 NOTE — TELEPHONE ENCOUNTER
TALKED TO PT WIFE AND SHE STATED THAT HE ALREADY HAS A VASCULAR SURGEON AND HE IS ALREADY SCHEDULED TO HAVE SURGERY.

## 2020-05-08 ENCOUNTER — TELEPHONE (OUTPATIENT)
Dept: VASCULAR SURGERY | Age: 80
End: 2020-05-08

## 2020-05-08 PROCEDURE — 93295 DEV INTERROG REMOTE 1/2/MLT: CPT | Performed by: CLINICAL NURSE SPECIALIST

## 2020-05-08 PROCEDURE — 93296 REM INTERROG EVL PM/IDS: CPT | Performed by: CLINICAL NURSE SPECIALIST

## 2020-05-08 NOTE — TELEPHONE ENCOUNTER
Spoke with patient's wife Kristin, informed her that he will need to get his COVID19 test done on Sat. 5/9/20 instead of Sun. 5/10/20. He will need to have test done before 11:00am. He will need to strict self quarantine after his COVID19 test until his procedure.

## 2020-05-09 ENCOUNTER — OFFICE VISIT (OUTPATIENT)
Age: 80
End: 2020-05-09

## 2020-05-09 VITALS — OXYGEN SATURATION: 90 % | TEMPERATURE: 99.2 F

## 2020-05-09 NOTE — PATIENT INSTRUCTIONS
be washed thoroughly with soap and water. Clean all high-touch surfaces everyday  High touch surfaces include counters, tabletops, doorknobs, bathroom fixtures, toilets, phones, keyboards, tablets, and bedside tables. Also, clean any surfaces that may have blood, stool, or body fluids on them. Use a household cleaning spray or wipe, according to the label instructions. Labels contain instructions for safe and effective use of the cleaning product including precautions you should take when applying the product, such as wearing gloves and making sure you have good ventilation during use of the product. Monitor your symptoms  Seek prompt medical attention if your illness is worsening (e.g., difficulty breathing). Before seeking care, call your healthcare provider and tell them that you have, or are being evaluated for, COVID-19. Put on a facemask before you enter the facility. These steps will help the healthcare providers office to keep other people in the office or waiting room from getting infected or exposed. Ask your healthcare provider to call the local or [facility] health department. Persons who are placed under active monitoring or facilitated self-monitoring should follow instructions provided by their local health department or occupational health professionals, as appropriate. When working with your local health department check their available hours. If you have a medical emergency and need to call 911, notify the dispatch personnel that you have, or are being evaluated for COVID-19. If possible, put on a facemask before emergency medical services arrive. Discontinuing home isolation  Patients with confirmed COVID-19 should remain under home isolation precautions until the risk of secondary transmission to others is thought to be low.  The decision to discontinue home isolation precautions should be made on a case-by-case basis, in consultation with healthcare providers and state and [facility] health

## 2020-05-12 ENCOUNTER — TELEPHONE (OUTPATIENT)
Dept: VASCULAR SURGERY | Age: 80
End: 2020-05-12

## 2020-05-12 LAB
REPORT: NORMAL
SARS-COV-2: NOT DETECTED
THIS TEST SENT TO: NORMAL

## 2020-05-12 NOTE — TELEPHONE ENCOUNTER
Spoke with patient's wife to inform that we have permission to proceed with angiogram as scheduled with Dr. Sunshine Guido for tomorrow and that if [address] results are not back we can do a Rapid test on arrival.

## 2020-05-13 ENCOUNTER — HOSPITAL ENCOUNTER (OUTPATIENT)
Dept: INTERVENTIONAL RADIOLOGY/VASCULAR | Age: 80
Discharge: HOME | End: 2020-05-13
Payer: MEDICARE

## 2020-05-13 VITALS
HEIGHT: 70 IN | DIASTOLIC BLOOD PRESSURE: 53 MMHG | BODY MASS INDEX: 24.34 KG/M2 | OXYGEN SATURATION: 94 % | TEMPERATURE: 99 F | HEART RATE: 63 BPM | RESPIRATION RATE: 21 BRPM | WEIGHT: 170 LBS | SYSTOLIC BLOOD PRESSURE: 139 MMHG

## 2020-05-13 PROBLEM — I70.219 ATHEROSCLEROSIS OF LOWER EXTREMITY WITH CLAUDICATION (HCC): Status: RESOLVED | Noted: 2018-04-24 | Resolved: 2020-05-13

## 2020-05-13 PROBLEM — I70.299 ATHEROSCLEROSIS OF ARTERY OF EXTREMITY WITH ULCERATION (HCC): Status: ACTIVE | Noted: 2018-04-24

## 2020-05-13 PROBLEM — L97.511: Status: RESOLVED | Noted: 2020-05-06 | Resolved: 2020-05-13

## 2020-05-13 PROBLEM — I70.203 ATHEROSCLEROSIS OF NATIVE ARTERY OF BOTH LOWER EXTREMITIES (HCC): Status: RESOLVED | Noted: 2020-05-06 | Resolved: 2020-05-13

## 2020-05-13 PROBLEM — L97.909 ATHEROSCLEROSIS OF ARTERY OF EXTREMITY WITH ULCERATION (HCC): Status: ACTIVE | Noted: 2018-04-24

## 2020-05-13 LAB
ANION GAP SERPL CALCULATED.3IONS-SCNC: 14 MMOL/L (ref 7–19)
BUN BLDV-MCNC: 23 MG/DL (ref 8–23)
CALCIUM SERPL-MCNC: 9.4 MG/DL (ref 8.8–10.2)
CHLORIDE BLD-SCNC: 99 MMOL/L (ref 98–111)
CO2: 27 MMOL/L (ref 22–29)
CREAT SERPL-MCNC: 1.5 MG/DL (ref 0.5–1.2)
GFR NON-AFRICAN AMERICAN: 45
GLUCOSE BLD-MCNC: 134 MG/DL (ref 74–109)
HCT VFR BLD CALC: 32.3 % (ref 42–52)
HEMOGLOBIN: 10 G/DL (ref 14–18)
MCH RBC QN AUTO: 29.9 PG (ref 27–31)
MCHC RBC AUTO-ENTMCNC: 31 G/DL (ref 33–37)
MCV RBC AUTO: 96.4 FL (ref 80–94)
PDW BLD-RTO: 15.4 % (ref 11.5–14.5)
PLATELET # BLD: 252 K/UL (ref 130–400)
PMV BLD AUTO: 8.8 FL (ref 9.4–12.4)
POTASSIUM SERPL-SCNC: 4.1 MMOL/L (ref 3.5–5)
RBC # BLD: 3.35 M/UL (ref 4.7–6.1)
SODIUM BLD-SCNC: 140 MMOL/L (ref 136–145)
WBC # BLD: 9.6 K/UL (ref 4.8–10.8)

## 2020-05-13 PROCEDURE — 93922 UPR/L XTREMITY ART 2 LEVELS: CPT

## 2020-05-13 PROCEDURE — 37226 HC FEMPOP PLASTY & STENT: CPT | Performed by: SURGERY

## 2020-05-13 PROCEDURE — 80048 BASIC METABOLIC PNL TOTAL CA: CPT

## 2020-05-13 PROCEDURE — 75716 ARTERY X-RAYS ARMS/LEGS: CPT | Performed by: SURGERY

## 2020-05-13 PROCEDURE — 6360000002 HC RX W HCPCS: Performed by: SURGERY

## 2020-05-13 PROCEDURE — 37229 HC ATHERECTOMY TIB/PER: CPT | Performed by: SURGERY

## 2020-05-13 PROCEDURE — 37229 PR REVSC OPN/PRQ TIB/PERO W/ATHRC/ANGIOP SM VSL: CPT | Performed by: SURGERY

## 2020-05-13 PROCEDURE — 6360000004 HC RX CONTRAST MEDICATION: Performed by: SURGERY

## 2020-05-13 PROCEDURE — 6370000000 HC RX 637 (ALT 250 FOR IP): Performed by: PHYSICIAN ASSISTANT

## 2020-05-13 PROCEDURE — 75625 CONTRAST EXAM ABDOMINL AORTA: CPT | Performed by: SURGERY

## 2020-05-13 PROCEDURE — 2709999900 IR AORTAGRAM ABDOMINAL SERIALOGRAM

## 2020-05-13 PROCEDURE — 36415 COLL VENOUS BLD VENIPUNCTURE: CPT

## 2020-05-13 PROCEDURE — 37226 PR REVSC OPN/PRQ FEM/POP W/STNT/ANGIOP SM VSL: CPT | Performed by: SURGERY

## 2020-05-13 PROCEDURE — 2500000003 HC RX 250 WO HCPCS: Performed by: SURGERY

## 2020-05-13 PROCEDURE — 93970 EXTREMITY STUDY: CPT

## 2020-05-13 PROCEDURE — 2580000003 HC RX 258: Performed by: PHYSICIAN ASSISTANT

## 2020-05-13 PROCEDURE — 85027 COMPLETE CBC AUTOMATED: CPT

## 2020-05-13 PROCEDURE — 6370000000 HC RX 637 (ALT 250 FOR IP): Performed by: SURGERY

## 2020-05-13 PROCEDURE — 37233 PR REVSC OPN/PRQ TIB/PERO W/ATHRC/ANGIOP UNI EA VSL: CPT | Performed by: SURGERY

## 2020-05-13 PROCEDURE — 6360000002 HC RX W HCPCS: Performed by: PHYSICIAN ASSISTANT

## 2020-05-13 RX ORDER — MIDAZOLAM HYDROCHLORIDE 1 MG/ML
INJECTION INTRAMUSCULAR; INTRAVENOUS
Status: COMPLETED | OUTPATIENT
Start: 2020-05-13 | End: 2020-05-13

## 2020-05-13 RX ORDER — LIDOCAINE HYDROCHLORIDE 20 MG/ML
INJECTION, SOLUTION INFILTRATION; PERINEURAL
Status: COMPLETED | OUTPATIENT
Start: 2020-05-13 | End: 2020-05-13

## 2020-05-13 RX ORDER — FENTANYL CITRATE 50 UG/ML
INJECTION, SOLUTION INTRAMUSCULAR; INTRAVENOUS
Status: COMPLETED | OUTPATIENT
Start: 2020-05-13 | End: 2020-05-13

## 2020-05-13 RX ORDER — CEPHALEXIN 250 MG/1
250 CAPSULE ORAL 2 TIMES DAILY
COMMUNITY
End: 2020-06-29

## 2020-05-13 RX ORDER — HEPARIN SODIUM 5000 [USP'U]/ML
INJECTION, SOLUTION INTRAVENOUS; SUBCUTANEOUS
Status: COMPLETED | OUTPATIENT
Start: 2020-05-13 | End: 2020-05-13

## 2020-05-13 RX ORDER — FUROSEMIDE 40 MG/1
40 TABLET ORAL 2 TIMES DAILY
COMMUNITY

## 2020-05-13 RX ORDER — SODIUM CHLORIDE 9 MG/ML
INJECTION, SOLUTION INTRAVENOUS CONTINUOUS
Status: DISCONTINUED | OUTPATIENT
Start: 2020-05-13 | End: 2020-05-15 | Stop reason: HOSPADM

## 2020-05-13 RX ORDER — ACETAMINOPHEN 160 MG
1 TABLET,DISINTEGRATING ORAL DAILY
COMMUNITY

## 2020-05-13 RX ORDER — HYDROCODONE BITARTRATE AND ACETAMINOPHEN 5; 325 MG/1; MG/1
2 TABLET ORAL EVERY 4 HOURS PRN
Status: DISCONTINUED | OUTPATIENT
Start: 2020-05-13 | End: 2020-05-15 | Stop reason: HOSPADM

## 2020-05-13 RX ORDER — FUROSEMIDE 20 MG/1
20 TABLET ORAL 2 TIMES DAILY
COMMUNITY

## 2020-05-13 RX ORDER — NITROGLYCERIN 20 MG/100ML
INJECTION INTRAVENOUS CONTINUOUS PRN
Status: COMPLETED | OUTPATIENT
Start: 2020-05-13 | End: 2020-05-13

## 2020-05-13 RX ORDER — CLOPIDOGREL BISULFATE 75 MG/1
75 TABLET ORAL DAILY
Status: DISCONTINUED | OUTPATIENT
Start: 2020-05-14 | End: 2020-05-15 | Stop reason: HOSPADM

## 2020-05-13 RX ORDER — VERAPAMIL HYDROCHLORIDE 2.5 MG/ML
INJECTION, SOLUTION INTRAVENOUS
Status: COMPLETED | OUTPATIENT
Start: 2020-05-13 | End: 2020-05-13

## 2020-05-13 RX ORDER — LABETALOL HYDROCHLORIDE 5 MG/ML
INJECTION, SOLUTION INTRAVENOUS
Status: COMPLETED | OUTPATIENT
Start: 2020-05-13 | End: 2020-05-13

## 2020-05-13 RX ORDER — ASPIRIN 81 MG/1
81 TABLET ORAL ONCE
Status: COMPLETED | OUTPATIENT
Start: 2020-05-13 | End: 2020-05-13

## 2020-05-13 RX ORDER — IODIXANOL 320 MG/ML
INJECTION, SOLUTION INTRAVASCULAR
Status: COMPLETED | OUTPATIENT
Start: 2020-05-13 | End: 2020-05-13

## 2020-05-13 RX ORDER — CALCIPOTRIENE 50 UG/G
CREAM TOPICAL
COMMUNITY
Start: 2020-04-08

## 2020-05-13 RX ORDER — CLOPIDOGREL BISULFATE 75 MG/1
150 TABLET ORAL ONCE
Status: COMPLETED | OUTPATIENT
Start: 2020-05-13 | End: 2020-05-13

## 2020-05-13 RX ORDER — CLOPIDOGREL BISULFATE 75 MG/1
75 TABLET ORAL DAILY
Qty: 30 TABLET | Refills: 5 | Status: SHIPPED | OUTPATIENT
Start: 2020-05-13 | End: 2020-11-09 | Stop reason: SDUPTHER

## 2020-05-13 RX ORDER — ONDANSETRON 2 MG/ML
4 INJECTION INTRAMUSCULAR; INTRAVENOUS EVERY 8 HOURS PRN
Status: DISCONTINUED | OUTPATIENT
Start: 2020-05-13 | End: 2020-05-15 | Stop reason: HOSPADM

## 2020-05-13 RX ORDER — MULTIVITAMIN WITH IRON
250 TABLET ORAL 2 TIMES DAILY
COMMUNITY

## 2020-05-13 RX ORDER — SODIUM CHLORIDE 0.9 % (FLUSH) 0.9 %
10 SYRINGE (ML) INJECTION PRN
Status: DISCONTINUED | OUTPATIENT
Start: 2020-05-13 | End: 2020-05-15 | Stop reason: HOSPADM

## 2020-05-13 RX ORDER — HYDROCODONE BITARTRATE AND ACETAMINOPHEN 5; 325 MG/1; MG/1
1 TABLET ORAL EVERY 4 HOURS PRN
Status: DISCONTINUED | OUTPATIENT
Start: 2020-05-13 | End: 2020-05-15 | Stop reason: HOSPADM

## 2020-05-13 RX ORDER — ACETAMINOPHEN 325 MG/1
650 TABLET ORAL EVERY 4 HOURS PRN
Status: DISCONTINUED | OUTPATIENT
Start: 2020-05-13 | End: 2020-05-15 | Stop reason: HOSPADM

## 2020-05-13 RX ORDER — CEFAZOLIN SODIUM 1 G/50ML
1 INJECTION, SOLUTION INTRAVENOUS ONCE
Status: COMPLETED | OUTPATIENT
Start: 2020-05-13 | End: 2020-05-13

## 2020-05-13 RX ADMIN — MIDAZOLAM 0.5 MG: 1 INJECTION INTRAMUSCULAR; INTRAVENOUS at 09:07

## 2020-05-13 RX ADMIN — IODIXANOL 180 ML: 320 INJECTION, SOLUTION INTRAVASCULAR at 11:13

## 2020-05-13 RX ADMIN — MIDAZOLAM 0.5 MG: 1 INJECTION INTRAMUSCULAR; INTRAVENOUS at 09:34

## 2020-05-13 RX ADMIN — ACETAMINOPHEN 650 MG: 325 TABLET ORAL at 12:40

## 2020-05-13 RX ADMIN — SODIUM CHLORIDE: 9 INJECTION, SOLUTION INTRAVENOUS at 08:12

## 2020-05-13 RX ADMIN — MIDAZOLAM 0.5 MG: 1 INJECTION INTRAMUSCULAR; INTRAVENOUS at 09:20

## 2020-05-13 RX ADMIN — LIDOCAINE HYDROCHLORIDE 10 ML: 20 INJECTION, SOLUTION INFILTRATION; PERINEURAL at 09:12

## 2020-05-13 RX ADMIN — HEPARIN SODIUM 1000 UNITS: 5000 INJECTION, SOLUTION INTRAVENOUS; SUBCUTANEOUS at 10:31

## 2020-05-13 RX ADMIN — HEPARIN SODIUM 6000 UNITS: 5000 INJECTION, SOLUTION INTRAVENOUS; SUBCUTANEOUS at 09:30

## 2020-05-13 RX ADMIN — CLOPIDOGREL BISULFATE 150 MG: 75 TABLET ORAL at 12:42

## 2020-05-13 RX ADMIN — FENTANYL CITRATE 50 MCG: 50 INJECTION, SOLUTION INTRAMUSCULAR; INTRAVENOUS at 10:42

## 2020-05-13 RX ADMIN — VERAPAMIL HYDROCHLORIDE 5 MG: 2.5 INJECTION, SOLUTION INTRAVENOUS at 10:22

## 2020-05-13 RX ADMIN — NITROGLYCERIN 200 MCG: 20 INJECTION INTRAVENOUS at 10:15

## 2020-05-13 RX ADMIN — FENTANYL CITRATE 50 MCG: 50 INJECTION, SOLUTION INTRAMUSCULAR; INTRAVENOUS at 10:51

## 2020-05-13 RX ADMIN — ASPIRIN 81 MG: 81 TABLET, COATED ORAL at 08:12

## 2020-05-13 RX ADMIN — MIDAZOLAM 0.5 MG: 1 INJECTION INTRAMUSCULAR; INTRAVENOUS at 08:46

## 2020-05-13 RX ADMIN — CEFAZOLIN SODIUM 1 G: 1 INJECTION, SOLUTION INTRAVENOUS at 08:25

## 2020-05-13 RX ADMIN — FENTANYL CITRATE 25 MCG: 50 INJECTION, SOLUTION INTRAMUSCULAR; INTRAVENOUS at 09:34

## 2020-05-13 RX ADMIN — Medication 10 MG: at 09:34

## 2020-05-13 RX ADMIN — FENTANYL CITRATE 50 MCG: 50 INJECTION, SOLUTION INTRAMUSCULAR; INTRAVENOUS at 09:46

## 2020-05-13 RX ADMIN — FENTANYL CITRATE 50 MCG: 50 INJECTION, SOLUTION INTRAMUSCULAR; INTRAVENOUS at 10:31

## 2020-05-13 RX ADMIN — FENTANYL CITRATE 25 MCG: 50 INJECTION, SOLUTION INTRAMUSCULAR; INTRAVENOUS at 08:46

## 2020-05-13 RX ADMIN — FENTANYL CITRATE 25 MCG: 50 INJECTION, SOLUTION INTRAMUSCULAR; INTRAVENOUS at 09:07

## 2020-05-13 RX ADMIN — HEPARIN SODIUM 5000 UNITS: 5000 INJECTION, SOLUTION INTRAVENOUS; SUBCUTANEOUS at 09:08

## 2020-05-13 RX ADMIN — FENTANYL CITRATE 25 MCG: 50 INJECTION, SOLUTION INTRAMUSCULAR; INTRAVENOUS at 09:20

## 2020-05-13 ASSESSMENT — PAIN DESCRIPTION - ONSET: ONSET: GRADUAL

## 2020-05-13 ASSESSMENT — PAIN SCALES - GENERAL
PAINLEVEL_OUTOF10: 6
PAINLEVEL_OUTOF10: 4

## 2020-05-13 ASSESSMENT — PAIN DESCRIPTION - LOCATION: LOCATION: FOOT;LEG

## 2020-05-13 ASSESSMENT — PAIN DESCRIPTION - PAIN TYPE: TYPE: CHRONIC PAIN

## 2020-05-13 ASSESSMENT — PAIN DESCRIPTION - DESCRIPTORS: DESCRIPTORS: ACHING;BURNING;CONSTANT

## 2020-05-13 ASSESSMENT — PAIN DESCRIPTION - ORIENTATION: ORIENTATION: RIGHT

## 2020-05-13 ASSESSMENT — PAIN DESCRIPTION - FREQUENCY: FREQUENCY: CONTINUOUS

## 2020-05-13 ASSESSMENT — PAIN DESCRIPTION - PROGRESSION: CLINICAL_PROGRESSION: GRADUALLY WORSENING

## 2020-05-13 NOTE — PROGRESS NOTES
PT TO W/C X 2 ASSIST. TRANSPORTED PT TO RESTROOM. PT SITTING ON COMMODE. INSTRUCTED PT TO PULL RED CORD WHEN HE IS READY.

## 2020-05-19 ENCOUNTER — TELEPHONE (OUTPATIENT)
Dept: VASCULAR SURGERY | Age: 80
End: 2020-05-19

## 2020-05-21 ENCOUNTER — OFFICE VISIT (OUTPATIENT)
Dept: VASCULAR SURGERY | Age: 80
End: 2020-05-21
Payer: MEDICARE

## 2020-05-21 VITALS
HEART RATE: 80 BPM | TEMPERATURE: 97.9 F | SYSTOLIC BLOOD PRESSURE: 148 MMHG | DIASTOLIC BLOOD PRESSURE: 50 MMHG | RESPIRATION RATE: 18 BRPM

## 2020-05-21 PROCEDURE — 99213 OFFICE O/P EST LOW 20 MIN: CPT | Performed by: PHYSICIAN ASSISTANT

## 2020-05-21 RX ORDER — PANTOPRAZOLE SODIUM 40 MG/1
40 TABLET, DELAYED RELEASE ORAL DAILY
Qty: 30 TABLET | Refills: 5 | Status: SHIPPED | OUTPATIENT
Start: 2020-05-21

## 2020-05-21 NOTE — PROGRESS NOTES
extremity with ulceration ([facility] 04/24/2018    Lumbar stenosis with neurogenic claudication 12/20/2017    Compression fracture of L4 lumbar vertebra 09/22/2017    S/P kyphoplasty 09/22/2017    Abnormal nuclear cardiac imaging test     SOB (shortness of breath) 04/25/2017    Fatigue 04/25/2017    Iron deficiency anemia 04/25/2017    History of amiodarone therapy 04/25/2017    Syncope     Carotid artery stenosis 04/21/2017    Chronic kidney disease (CKD), stage III (moderate) ([facility])     NSVT (nonsustained ventricular tachycardia) ([facility])     S/P CABG x 6     Sustained VT (ventricular tachycardia) ([facility])     Ventricular tachyarrhythmia ([facility])     VT (ventricular tachycardia) ([facility] 04/27/2015    AICD (automatic cardioverter/defibrillator) present 04/27/2015    Hypercholesterolemia     Hypertension     COPD (chronic obstructive pulmonary disease) ([facility])     Anxiety     Depression      Current Outpatient Medications   Medication Sig Dispense Refill    magnesium (MAGNESIUM-OXIDE) 250 MG TABS tablet Take 250 mg by mouth 2 times daily      cephALEXin (KEFLEX) 250 MG capsule Take 250 mg by mouth 2 times daily      calcipotriene (DOVONEX) 0.005 % cream       furosemide (LASIX) 40 MG tablet Take 40 mg by mouth 2 times daily      furosemide (LASIX) 20 MG tablet Take 20 mg by mouth 2 times daily Total of 60 mg (20 mg tablet & 40 mg tablet      Cholecalciferol (VITAMIN D3) 50 MCG (2000 UT) CAPS Take 1 capsule by mouth daily      clopidogrel (PLAVIX) 75 MG tablet Take 1 tablet by mouth daily 30 tablet 5    apixaban (ELIQUIS) 5 MG TABS tablet Take 1 tablet by mouth 2 times daily 60 tablet 1    traZODone (DESYREL) 50 MG tablet Take 50 mg by mouth daily      Tiotropium Bromide-Olodaterol (STIOLTO RESPIMAT) 2.5-2.5 MCG/ACT AERS Inhale into the lungs 2 puffs daily      Ferrous Sulfate (IRON) 325 (65 Fe) MG TABS Take by mouth daily      guaiFENesin (MUCINEX) 600 MG extended release tablet Take 600 mg by mouth 2 significant hearing loss. Eyes - no sudden vision change or amaurosis. Respiratory - no significant shortness of breath, wheezing, or stridor. No apnea, cough, or chest tightness associated with shortness of breath. Cardiovascular - no chest pain, syncope, or significant dizziness. No palpitations or significant leg swelling. Gastrointestinal - no abdominal swelling or pain. No blood in stool. No severe constipation, diarrhea, nausea, or vomiting. Genitourinary - No difficulty urinating, dysuria, frequency, or urgency. No flank pain or hematuria. Musculoskeletal - no back pain, gait disturbance, or myalgia. Skin - no color change, rash, pallor. Right toe wound. Neurologic - no dizziness, facial asymmetry, or light headedness. No seizures. No speech difficulty or lateralizing weakness. Hematologic - no easy bruising or excessive bleeding. Psychiatric - no severe anxiety or nervousness. No confusion. All other review of systems are negative. Physical Exam    BP (!) 148/50 (Site: Left Upper Arm, Position: Sitting, Cuff Size: Medium Adult)   Pulse 80   Temp 97.9 °F (36.6 °C) (Temporal)   Resp 18     Constitutional - well developed, well nourished. No diaphoresis or acute distress. HENT - head normocephalic. Right external ear canal appears normal.  Left external ear canal appears normal.  Septum appears midline. Eyes - conjunctiva normal.  EOMS normal.  No exudate. No icterus. Neck- ROM appears normal, no tracheal deviation. Cardiovascular - Regular rate and rhythm. Heart sounds are normal.  No murmur, rub, or gallop. Carotid pulses are 2+ to palpation bilaterally without bruit. Extremities - Radial and brachial pulses are 2+ to palpation bilaterally. Femoral pulses are palpable. Right DP and PT palpable pulses. He has superficial ulcers noted on all toes right foot. Mild erythema noted. No foul odor noted. No cyanosis, clubbing, or significant edema.   No signs atheroembolic

## 2020-05-21 NOTE — OP NOTE
findings. The catheter was withdrawn to the distal abdominal aorta distal abdominal aortogram with bilateral iliofemoral arteriograms were performed in both oblique views with the above findings. Finally, bilateral lower extremity arteriograms were performed with the above findings. The patient was given intravenous heparin and then he was redosed every hour throughout the procedure. Utilizing an angled Glidewire and the Omni Flush catheter, I was able to pass a 6 [place] Aura destination sheath down to the right common femoral artery. Selective right lower extremity arteriograms were performed and utilizing roadmap assistance as well as an angled Glidewire and a Evans cross catheter, I was able to traverse the high-grade stenoses in the occluded distal superficial femoral artery. I verified I was in the true lumen distally with puff angiography. The patient has previously had atherectomy and drug-coated balloon angioplasty alone. I think that these lesions might be better treated with a stent with the previous treatment with atherectomy and drug-coated balloon did not yield long-term good results. Balloon angioplasty was per first performed of the entire superficial femoral and above-knee popliteal artery with 6 mm diameter conquest high-pressure balloon. Next, drug-coated stents were placed from the mid popliteal artery all the way up to the proximal superficial femoral artery. Balloon angioplasty was then performed within the stents with a 6 mm x 40 mm conquest balloon. Right lower extremity arteriogram show a very good result here. There is no significant residual stenosis nor extravasation of dye. Next, the distal popliteal/anterior tibial artery plaque was treated with atherectomy with a 1.25 solid crown CSI device. Passes were performed on the low and medium speed as well as 1 pass with high-speed. Balloon angioplasty was performed with a 3 mm diameter coyote balloon.   This balloon was left

## 2020-06-02 ENCOUNTER — TELEPHONE (OUTPATIENT)
Dept: VASCULAR SURGERY | Age: 80
End: 2020-06-02

## 2020-06-11 ENCOUNTER — TELEPHONE (OUTPATIENT)
Dept: CARDIOLOGY | Age: 80
End: 2020-06-11

## 2020-06-16 ENCOUNTER — OFFICE VISIT (OUTPATIENT)
Dept: CARDIOLOGY | Age: 80
End: 2020-06-16
Payer: MEDICARE

## 2020-06-16 VITALS
DIASTOLIC BLOOD PRESSURE: 62 MMHG | WEIGHT: 177 LBS | HEIGHT: 70 IN | BODY MASS INDEX: 25.34 KG/M2 | HEART RATE: 72 BPM | SYSTOLIC BLOOD PRESSURE: 182 MMHG

## 2020-06-16 PROCEDURE — 99213 OFFICE O/P EST LOW 20 MIN: CPT | Performed by: INTERNAL MEDICINE

## 2020-06-16 PROCEDURE — 93282 PRGRMG EVAL IMPLANTABLE DFB: CPT | Performed by: INTERNAL MEDICINE

## 2020-06-16 NOTE — PROGRESS NOTES
Fatigue 04/25/2017    Iron deficiency anemia 04/25/2017    History of amiodarone therapy 04/25/2017    Syncope     Carotid artery stenosis 04/21/2017    Chronic kidney disease (CKD), stage III (moderate) [facility]     NSVT (nonsustained ventricular tachycardia) [facility]     S/P CABG x 6     Sustained VT (ventricular tachycardia) [facility]     Ventricular tachyarrhythmia ([facility])     VT (ventricular tachycardia) ([facility] 04/27/2015    AICD (automatic cardioverter/defibrillator) present 04/27/2015    Hypercholesterolemia     Hypertension     COPD (chronic obstructive pulmonary disease) ([facility])     Anxiety     Depression      Current Outpatient Medications   Medication Sig Dispense Refill    pantoprazole (PROTONIX) 40 MG tablet Take 1 tablet by mouth daily 30 tablet 5    magnesium (MAGNESIUM-OXIDE) 250 MG TABS tablet Take 250 mg by mouth 2 times daily      cephALEXin (KEFLEX) 250 MG capsule Take 250 mg by mouth 2 times daily      calcipotriene (DOVONEX) 0.005 % cream       furosemide (LASIX) 40 MG tablet Take 40 mg by mouth 2 times daily      furosemide (LASIX) 20 MG tablet Take 20 mg by mouth 2 times daily Total of 60 mg (20 mg tablet & 40 mg tablet      Cholecalciferol (VITAMIN D3) 50 MCG (2000 UT) CAPS Take 1 capsule by mouth daily      clopidogrel (PLAVIX) 75 MG tablet Take 1 tablet by mouth daily 30 tablet 5    traZODone (DESYREL) 50 MG tablet Take 50 mg by mouth daily      Tiotropium Bromide-Olodaterol (STIOLTO RESPIMAT) 2.5-2.5 MCG/ACT AERS Inhale into the lungs 2 puffs daily      Ferrous Sulfate (IRON) 325 (65 Fe) MG TABS Take by mouth daily      guaiFENesin (MUCINEX) 600 MG extended release tablet Take 600 mg by mouth 2 times daily       DULoxetine (CYMBALTA) 60 MG extended release capsule Take 60 mg by mouth daily      Calcium Carb-Cholecalciferol (CALCIUM 600+D) 600-800 MG-UNIT TABS Take by mouth      Multiple Vitamins-Minerals (THERAPEUTIC MULTIVITAMIN-MINERALS) tablet Take 1 tablet by mouth daily      docusate sodium (COLACE) 100 MG capsule Take 1 capsule by mouth 2 times daily as needed for Constipation 30 capsule 1    Misc. Devices [facility]) MISC 1 each by Does not apply route daily 1 each 0    OXYGEN Inhale 4 L into the lungs as needed      atorvastatin (LIPITOR) 40 MG tablet TAKE 1 TABLET DAILY 90 tablet 3    amiodarone (CORDARONE) 200 MG tablet Take 1 tablet by mouth daily 90 tablet 3    isosorbide mononitrate (IMDUR) 60 MG extended release tablet Take 1 tablet by mouth daily 90 tablet 3    KLOR-CON M20 20 MEQ extended release tablet Take 20 mEq by mouth daily       albuterol (PROVENTIL) (2.5 MG/3ML) 0.083% nebulizer solution Take 2.5 mg by nebulization every 6 hours as needed for Wheezing      ondansetron (ZOFRAN) 4 MG tablet Take 1 tablet by mouth every 8 hours as needed for Nausea or Vomiting (Patient not taking: Reported on 6/16/2020) 30 tablet 1     No current facility-administered medications for this visit. 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 Bob Peacock CAD (coronary artery disease)     a. History of open-heart surgery X6, 01/12/06, utilizing the left VANCE to the LAD, sequential vein graft to the intermeditate and obtuse marginal, individually reverse saphenous vein graft to the acute maginal of the PDA and PLOM. b. Stress echocardiogram 04/22/08, negative for myocardial ischemia.     Carotid artery stenosis     CHF (congestive heart failure) (HCC)     Chronic kidney disease     [facility]), [facility] ([facility]) [facility]     COPD (chronic obstructive pulmonary disease) ([facility]     Depression     Gastric ulcer 11/06/2004    multiple, duodenal ulcer - Dr. Kaylyn Sal History of blood transfusion 01/2020    Hypercholesterolemia     Hypertension     Iron deficiency     hx iron infusions    MDRO (multiple drug resistant organisms) resistance 2018    post arteriogram    Thyroid disease     Ulcer 09/08/2010    Rectum.  Ventricular tachyarrhythmia [facility])      Past Surgical History:   Procedure Laterality Date    BACK SURGERY  08/17/2017    CARDIAC CATHETERIZATION  4/22/15  JDT    EF 50%    CARDIAC DEFIBRILLATOR PLACEMENT  04/24/2015    AICD    CARDIAC SURGERY  1/06    Open Heart Surgery (x6)    CATARACT REMOVAL  05    COLON SURGERY  6/26/04    Colon reversal (10/18/2004); Colon re-reversal (10/26/2004).  EYE SURGERY  2019    EYE SURGERY  2005    HERNIA REPAIR  10/26/2006    Ileostomy reversal and hernia repair.  KNEE SURGERY  1996    LAMINEC/FACETECT/FORAMIN,CERVICAL 1 SEG N/A 12/20/2017    L3-4 decompressive laminectomy using minimally invasive technique performed by Milagros Barajas DO at  [facility]  11/2004    Bleeding ulcers.  OTHER SURGICAL HISTORY  02/14/2007    Debridement of abdominal/with pulse irrigation.  [place] OFFICE/OUTPT VISIT,PROCEDURE ONLY N/A 8/20/2018    kyphoplasty of L5 performed by Milagros Barajas DO at [facility] 11 BL VES DIRECT,LOW EXTREM Right 5/23/2018    RIGHT GROIN, DEBRIDEMENT AND MUSCLE FLAP  [facility]PS[facility] Right 4/24/2018    LOWER EXTREMITY ANGIOGRAM, BALLOON ANGIOPLASTY, ATHERECTOMY performed by Belkis Gao MD at [address] Right 4/24/2018    COMMON FEMORAL ARTERY ENDARTERECTOMY performed by Belkis Gao MD at [place] [address]  05/30/2005    RETINAL DETACHMENT SURGERY  06/21/2005    VASCULAR SURGERY  06/05/2017    SJS. Left CFA 5f-6-f

## 2020-06-18 ENCOUNTER — TELEPHONE (OUTPATIENT)
Dept: VASCULAR SURGERY | Age: 80
End: 2020-06-18

## 2020-06-18 RX ORDER — ACETYLCYSTEINE 100 MG/ML
SOLUTION ORAL; RESPIRATORY (INHALATION)
Qty: 1 ML | Refills: 0 | OUTPATIENT
Start: 2020-06-18

## 2020-06-25 ENCOUNTER — OFFICE VISIT (OUTPATIENT)
Age: 80
End: 2020-06-25

## 2020-06-25 VITALS — OXYGEN SATURATION: 96 % | HEART RATE: 89 BPM | TEMPERATURE: 98.2 F

## 2020-06-25 DIAGNOSIS — Z01.818 PRE-OP TESTING: ICD-10-CM

## 2020-06-25 LAB
ANION GAP SERPL CALCULATED.3IONS-SCNC: 12 MMOL/L (ref 7–19)
BUN BLDV-MCNC: 19 MG/DL (ref 8–23)
CALCIUM SERPL-MCNC: 9.5 MG/DL (ref 8.8–10.2)
CHLORIDE BLD-SCNC: 102 MMOL/L (ref 98–111)
CO2: 28 MMOL/L (ref 22–29)
CREAT SERPL-MCNC: 1.4 MG/DL (ref 0.5–1.2)
GFR NON-AFRICAN AMERICAN: 49
GLUCOSE BLD-MCNC: 131 MG/DL (ref 74–109)
HCT VFR BLD CALC: 38.4 % (ref 42–52)
HEMOGLOBIN: 11.9 G/DL (ref 14–18)
MCH RBC QN AUTO: 29.8 PG (ref 27–31)
MCHC RBC AUTO-ENTMCNC: 31 G/DL (ref 33–37)
MCV RBC AUTO: 96 FL (ref 80–94)
PDW BLD-RTO: 14.9 % (ref 11.5–14.5)
PLATELET # BLD: 185 K/UL (ref 130–400)
PMV BLD AUTO: 10.7 FL (ref 9.4–12.4)
POTASSIUM SERPL-SCNC: 4.8 MMOL/L (ref 3.5–5)
RBC # BLD: 4 M/UL (ref 4.7–6.1)
SARS-COV-2, PCR: NOT DETECTED
SODIUM BLD-SCNC: 142 MMOL/L (ref 136–145)
WBC # BLD: 8.8 K/UL (ref 4.8–10.8)

## 2020-06-25 NOTE — PATIENT INSTRUCTIONS
Preventing the Spread of Coronavirus Disease 2019 in Homes and Residential Communities   For the most recent information go to [email or web address]    Prevention steps for People with confirmed or suspected COVID-19 (including persons under investigation) who do not need to be hospitalized  and   People with confirmed COVID-19 who were hospitalized and determined to be medically stable to go home    Your healthcare provider and public health staff will evaluate whether you can be cared for at home. If it is determined that you do not need to be hospitalized and can be isolated at home, you will be monitored by staff from your local or state health department. You should follow the prevention steps below until a healthcare provider or local or state health department says you can return to your normal activities. Stay home except to get medical care  People who are mildly ill with COVID-19 are able to isolate at home during their illness. You should restrict activities outside your home, except for getting medical care. Do not go to work, school, or public areas. Avoid using public transportation, ride-sharing, or taxis. Separate yourself from other people and animals in your home  People: As much as possible, you should stay in a specific room and away from other people in your home. Also, you should use a separate bathroom, if available. Animals: You should restrict contact with pets and other animals while you are sick with COVID-19, just like you would around other people. Although there have not been reports of pets or other animals becoming sick with COVID-19, it is still recommended that people sick with COVID-19 limit contact with animals until more information is known about the virus. When possible, have another member of your household care for your animals while you are sick.  If you are sick with COVID-19, avoid contact with your pet, including

## 2020-06-26 ENCOUNTER — PREP FOR PROCEDURE (OUTPATIENT)
Dept: VASCULAR SURGERY | Age: 80
End: 2020-06-26

## 2020-06-26 NOTE — H&P (VIEW-ONLY)
[facility] 04/24/2018    Lumbar stenosis with neurogenic claudication 12/20/2017    Compression fracture of L4 lumbar vertebra 09/22/2017    S/P kyphoplasty 09/22/2017    Abnormal nuclear cardiac imaging test     SOB (shortness of breath) 04/25/2017    Fatigue 04/25/2017    Iron deficiency anemia 04/25/2017    History of amiodarone therapy 04/25/2017    Syncope     Carotid artery stenosis 04/21/2017    Chronic kidney disease [facility]CKD), stage III (moderate) ([facility]     NSVT (nonsustained ventricular tachycardia) ([facility])     S/P CABG x 6     Sustained VT (ventricular tachycardia) ([facility])     Ventricular tachyarrhythmia ([facility])     VT (ventricular tachycardia) ([facility] 04/27/2015    AICD (automatic cardioverter/defibrillator) present 04/27/2015    Hypercholesterolemia     Hypertension     COPD (chronic obstructive pulmonary disease) ([facility])     Anxiety     Depression      Current Outpatient Medications   Medication Sig Dispense Refill    acetylcysteine (MUCOMYST) 10 % nebulizer solution Procedure scheduled for 6/29/20. Mucomyst 600mg po bid the day before procedure, the day of procedure, the day after procedure.  1 mL 0    pantoprazole (PROTONIX) 40 MG tablet Take 1 tablet by mouth daily 30 tablet 5    magnesium (MAGNESIUM-OXIDE) 250 MG TABS tablet Take 250 mg by mouth 2 times daily      cephALEXin (KEFLEX) 250 MG capsule Take 250 mg by mouth 2 times daily      calcipotriene (DOVONEX) 0.005 % cream       furosemide (LASIX) 40 MG tablet Take 40 mg by mouth 2 times daily      furosemide (LASIX) 20 MG tablet Take 20 mg by mouth 2 times daily Total of 60 mg (20 mg tablet & 40 mg tablet      Cholecalciferol (VITAMIN D3) 50 MCG (2000 UT) CAPS Take 1 capsule by mouth daily      clopidogrel (PLAVIX) 75 MG tablet Take 1 tablet by mouth daily 30 tablet 5    traZODone (DESYREL) 50 MG tablet Take 50 mg by mouth daily      Tiotropium Bromide-Olodaterol (STIOLTO RESPIMAT) 2.5-2.5 MCG/ACT AERS Inhale into the lungs 2 puffs daily      Ferrous Sulfate (IRON) 325 (65 Fe) MG TABS Take by mouth daily      guaiFENesin (MUCINEX) 600 MG extended release tablet Take 600 mg by mouth 2 times daily       DULoxetine (CYMBALTA) 60 MG extended release capsule Take 60 mg by mouth daily      Calcium Carb-Cholecalciferol (CALCIUM 600+D) 600-800 MG-UNIT TABS Take by mouth      Multiple Vitamins-Minerals (THERAPEUTIC MULTIVITAMIN-MINERALS) tablet Take 1 tablet by mouth daily      docusate sodium (COLACE) 100 MG capsule Take 1 capsule by mouth 2 times daily as needed for Constipation 30 capsule 1    ondansetron (ZOFRAN) 4 MG tablet Take 1 tablet by mouth every 8 hours as needed for Nausea or Vomiting (Patient not taking: Reported on 6/16/2020) 30 tablet 1    Misc. Devices [facility]) MISC 1 each by Does not apply route daily 1 each 0    OXYGEN Inhale 4 L into the lungs as needed      atorvastatin (LIPITOR) 40 MG tablet TAKE 1 TABLET DAILY 90 tablet 3    amiodarone (CORDARONE) 200 MG tablet Take 1 tablet by mouth daily 90 tablet 3    isosorbide mononitrate (IMDUR) 60 MG extended release tablet Take 1 tablet by mouth daily 90 tablet 3    KLOR-CON M20 20 MEQ extended release tablet Take 20 mEq by mouth daily       albuterol (PROVENTIL) (2.5 MG/3ML) 0.083% nebulizer solution Take 2.5 mg by nebulization every 6 hours as needed for Wheezing       No current facility-administered medications for this visit. 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 Beatrice Guido CAD (coronary artery disease)     a. History of open-heart surgery X6, 01/12/06, utilizing the left VANCE to the LAD, sequential vein graft to the intermeditate and obtuse marginal, individually reverse saphenous vein graft to the acute maginal of the PDA and PLOM. b. Stress echocardiogram 04/22/08, negative for myocardial ischemia.  Carotid artery stenosis     CHF (congestive heart failure) (HCC)     Chronic kidney disease     Chronic kidney disease (CKD), stage III (moderate) (HCC)     COPD (chronic obstructive pulmonary disease) ([address]     Depression     Gastric ulcer 11/06/2004    multiple, duodenal ulcer - Dr. Vaishali Duron History of blood transfusion 01/2020    Hypercholesterolemia     Hypertension     Iron deficiency     hx iron infusions    MDRO (multiple drug resistant organisms) resistance 2018    post arteriogram    Thyroid disease     Ulcer 09/08/2010    Rectum.  Ventricular tachyarrhythmia [facility])      Past Surgical History:   Procedure Laterality Date    BACK SURGERY  08/17/2017    CARDIAC CATHETERIZATION  4/22/15  JDT    EF 50%    CARDIAC DEFIBRILLATOR PLACEMENT  04/24/2015    AICD    CARDIAC SURGERY  1/06    Open Heart Surgery (x6)    CATARACT REMOVAL  05    COLON SURGERY  6/26/04    Colon reversal (10/18/2004); Colon re-reversal (10/26/2004).  EYE SURGERY  2019    EYE SURGERY  2005    HERNIA REPAIR  10/26/2006    Ileostomy reversal and hernia repair.  KNEE SURGERY  1996    LAMINEC/FACETECT/FORAMIN,CERVICAL 1 SEG N/A 12/20/2017    L3-4 decompressive laminectomy using minimally invasive technique performed by Libia Cerna DO at [facility]  11/2004    Bleeding ulcers.  OTHER SURGICAL HISTORY  02/14/2007    Debridement of abdominal/with pulse irrigation.     [place] OFFICE/OUTPT VISIT,PROCEDURE ONLY N/A 8/20/2018    kyphoplasty of L5 performed by Libia Cerna DO at [address] REPR BL VES DIRECT,LOW EXTREM Right 5/23/2018    RIGHT GROIN, DEBRIDEMENT AND MUSCLE FLAP  performed by Christopher Sullivan MD at [address] [place] [address] [place] REVASCULARIZATION ILIAC ART ANGIOP EA IPSI VSL Right 2018    LOWER EXTREMITY ANGIOGRAM, BALLOON ANGIOPLASTY, ATHERECTOMY performed by Radha Chavarria MD at [address] Right 2018    COMMON FEMORAL ARTERY ENDARTERECTOMY performed by Radha Chavarria MD at [facility] 24  2005    RETINAL DETACHMENT SURGERY  2005    VASCULAR SURGERY  2017    SJS. Left CFA 5f-6-f destination,aortogram with bilateral lower arteriogram,right SFA/Pop atherectomy CSI 1.25 solid,right SFA /pop balloon balloon angioplasty 6x150 mynx left CFA.  VASCULAR SURGERY Right 18 SJS    Rigth common femoral/superficial femoral artery endarterectomy with right GSV patch, Left CFA 5F-6F sheath, RLE Angiogram, Right SFA/Popliteal balloon atherectomy with CSI 2.0 solid crown, Right Popliteal balloon angoplasty 5x150 lutonix, Right SFA Balloon Angioplasty 6x100 lutonix,    VASCULAR SURGERY  18 SJS    1. Right common femoral/superficial femoral artery endarterectomy with (R) GSV Patch. 2. Left CFA 5f-6f sheath. 3. RLE A'gram. 4. (R) SFA/popliteal atherectomy with CSI 2.0 solid crown. 5.(R) popliteal balloon angioplasty 5x150 lutonix 6. (R)SFA Balloon a'plasty 6x250, 6x100 LUTONIX 7. Completion RLE A'gram 8.  (L) CFA MYNX     Family History   Problem Relation Age of Onset    Hypertension Mother     Diabetes Father     Dementia Father     Cancer Father     Colon Cancer Father     Hypertension Father     Cancer Brother     Esophageal Cancer Sister      Social History     Tobacco Use    Smoking status: Former Smoker     Packs/day: 1.50     Years: 50.00     Pack years: 75.00     Types: Cigarettes     Last attempt to quit: 2015     Years since quittin.1    Smokeless tobacco: Never Used   Substance Use Topics    Alcohol use: Not Currently         Review of Systems    Constitutional - no significant activity change, appetite change, or

## 2020-06-26 NOTE — H&P
[facility] ulceration [facility] 04/24/2018    Lumbar stenosis with neurogenic claudication 12/20/2017    Compression fracture of L4 lumbar vertebra 09/22/2017    S/P kyphoplasty 09/22/2017    Abnormal nuclear cardiac imaging test     SOB (shortness of breath) 04/25/2017    Fatigue 04/25/2017    Iron deficiency anemia 04/25/2017    History of amiodarone therapy 04/25/2017    Syncope     Carotid artery stenosis 04/21/2017    [facility]CKD[facility]) [facility]     NSVT (nonsustained ventricular tachycardia) ([facility]     S/P CABG x 6     Sustained VT (ventricular tachycardia) ([facility]     Ventricular tachyarrhythmia ([facility])     VT (ventricular tachycardia) ([facility] 04/27/2015    AICD (automatic cardioverter/defibrillator) present 04/27/2015    Hypercholesterolemia     Hypertension     COPD (chronic obstructive pulmonary disease) ([facility])     Anxiety     Depression      Current Outpatient Medications   Medication Sig Dispense Refill    acetylcysteine (MUCOMYST) 10 % nebulizer solution Procedure scheduled for 6/29/20. Mucomyst 600mg po bid the day before procedure, the day of procedure, the day after procedure.  1 mL 0    pantoprazole (PROTONIX) 40 MG tablet Take 1 tablet by mouth daily 30 tablet 5    magnesium (MAGNESIUM-OXIDE) 250 MG TABS tablet Take 250 mg by mouth 2 times daily      cephALEXin (KEFLEX) 250 MG capsule Take 250 mg by mouth 2 times daily      calcipotriene (DOVONEX) 0.005 % cream       furosemide (LASIX) 40 MG tablet Take 40 mg by mouth 2 times daily      furosemide (LASIX) 20 MG tablet Take 20 mg by mouth 2 times daily Total of 60 mg (20 mg tablet & 40 mg tablet      Cholecalciferol (VITAMIN D3) 50 MCG (2000 UT) CAPS Take 1 capsule by mouth daily      clopidogrel (PLAVIX) 75 MG tablet Take 1 tablet by mouth daily 30 tablet 5    traZODone (DESYREL) 50 MG tablet Take 50 mg by mouth daily      Tiotropium Bromide-Olodaterol (STIOLTO RESPIMAT) 2.5-2.5 MCG/ACT AERS [place] REVASCULARIZATION ILIAC ART ANGIOP EA IPSI VSL Right 2018    LOWER EXTREMITY ANGIOGRAM, BALLOON ANGIOPLASTY, ATHERECTOMY performed by Ambreen Ventura MD at [address] Right 2018    COMMON FEMORAL ARTERY ENDARTERECTOMY performed by [facility]reen [facility] MD at [facility] 24  2005    RETINAL DETACHMENT SURGERY  2005    VASCULAR SURGERY  2017    SJS. Left CFA 5f-6-f destination,aortogram with bilateral lower arteriogram,right SFA/Pop atherectomy CSI 1.25 solid,right SFA /pop balloon balloon angioplasty 6x150 mynx left CFA.  VASCULAR SURGERY Right 18 SJS    Rigth common femoral/superficial femoral artery endarterectomy with right GSV patch, Left CFA 5F-6F sheath, RLE Angiogram, Right SFA/Popliteal balloon atherectomy with CSI 2.0 solid crown, Right Popliteal balloon angoplasty 5x150 lutonix, Right SFA Balloon Angioplasty 6x100 lutonix,    VASCULAR SURGERY  18 SJS    1. Right common femoral/superficial femoral artery endarterectomy with (R) GSV Patch. 2. Left CFA 5f-6f sheath. 3. RLE A'gram. 4. (R) SFA/popliteal atherectomy with CSI 2.0 solid crown. 5.(R) popliteal balloon angioplasty 5x150 lutonix 6. (R)SFA Balloon a'plasty 6x250, 6x100 LUTONIX 7. Completion RLE A'gram 8.  (L) CFA MYNX     Family History   Problem Relation Age of Onset    Hypertension Mother     Diabetes Father     Dementia Father     Cancer Father     Colon Cancer Father     Hypertension Father     Cancer Brother     Esophageal Cancer Sister      Social History     Tobacco Use    Smoking status: Former Smoker     Packs/day: 1.50     Years: 50.00     Pack years: 75.00     Types: Cigarettes     Last attempt to quit: 2015     Years since quittin.1    Smokeless tobacco: Never Used   Substance Use Topics    Alcohol use: Not Currently         Review of Systems    Constitutional - no significant activity change, appetite change, or We will plan to repeat the H&H on 5/26/2020    Addendum: 5/26/2020  I spoke with patient's wife on the phone she reports that the patient fell Monday night and went to the ER for evaluation and was released. He presented  Back to the ER at [facility] for pneumonia and increased WBC. He is currently admitted. WBC 17.6, H&H 7.5/24.4. She is to notify us when he gets out of the hospital, so we can plan a follow up. She  verbalized understanding    Addendum: 6/26/2020  Patient's wife called stating the patient is out of the hospital and he is having Rest pain in left foot. She reports that he wounds on the right toes are healing and look much better.  He is ready to Proceed with A-gram.     Proceed with aortogram with runoff possible angioplasty/atherectomy/stent (We will pre treat with Mucomyst and IVF's for hydration for his renal insufficiency)          Kerline - Vu Rangel, DO

## 2020-06-29 ENCOUNTER — HOSPITAL ENCOUNTER (OUTPATIENT)
Dept: INTERVENTIONAL RADIOLOGY/VASCULAR | Age: 80
Discharge: HOME | End: 2020-06-29
Payer: MEDICARE

## 2020-06-29 VITALS
WEIGHT: 176 LBS | DIASTOLIC BLOOD PRESSURE: 61 MMHG | HEART RATE: 64 BPM | RESPIRATION RATE: 14 BRPM | TEMPERATURE: 98.5 F | SYSTOLIC BLOOD PRESSURE: 141 MMHG | OXYGEN SATURATION: 90 % | HEIGHT: 72 IN | BODY MASS INDEX: 23.84 KG/M2

## 2020-06-29 PROBLEM — I70.219 ATHEROSCLEROSIS OF LOWER EXTREMITY WITH CLAUDICATION (HCC): Status: ACTIVE | Noted: 2020-06-29

## 2020-06-29 PROCEDURE — 93922 UPR/L XTREMITY ART 2 LEVELS: CPT

## 2020-06-29 PROCEDURE — 75716 ARTERY X-RAYS ARMS/LEGS: CPT | Performed by: SURGERY

## 2020-06-29 PROCEDURE — 2580000003 HC RX 258: Performed by: SURGERY

## 2020-06-29 PROCEDURE — 2580000003 HC RX 258: Performed by: PHYSICIAN ASSISTANT

## 2020-06-29 PROCEDURE — 75625 CONTRAST EXAM ABDOMINL AORTA: CPT | Performed by: SURGERY

## 2020-06-29 PROCEDURE — 6360000004 HC RX CONTRAST MEDICATION: Performed by: SURGERY

## 2020-06-29 PROCEDURE — 37226 PR REVSC OPN/PRQ FEM/POP W/STNT/ANGIOP SM VSL: CPT | Performed by: SURGERY

## 2020-06-29 PROCEDURE — 2500000003 HC RX 250 WO HCPCS: Performed by: PHYSICIAN ASSISTANT

## 2020-06-29 PROCEDURE — 2709999900 IR AORTAGRAM ABDOMINAL SERIALOGRAM

## 2020-06-29 PROCEDURE — 6360000002 HC RX W HCPCS: Performed by: SURGERY

## 2020-06-29 PROCEDURE — 6370000000 HC RX 637 (ALT 250 FOR IP): Performed by: SURGERY

## 2020-06-29 PROCEDURE — 37226 HC FEMPOP PLASTY & STENT: CPT | Performed by: SURGERY

## 2020-06-29 RX ORDER — MIDAZOLAM HYDROCHLORIDE 1 MG/ML
INJECTION INTRAMUSCULAR; INTRAVENOUS
Status: COMPLETED | OUTPATIENT
Start: 2020-06-29 | End: 2020-06-29

## 2020-06-29 RX ORDER — HYDRALAZINE HYDROCHLORIDE 20 MG/ML
INJECTION INTRAMUSCULAR; INTRAVENOUS
Status: COMPLETED | OUTPATIENT
Start: 2020-06-29 | End: 2020-06-29

## 2020-06-29 RX ORDER — ONDANSETRON 2 MG/ML
4 INJECTION INTRAMUSCULAR; INTRAVENOUS EVERY 8 HOURS PRN
Status: DISCONTINUED | OUTPATIENT
Start: 2020-06-29 | End: 2020-07-01 | Stop reason: HOSPADM

## 2020-06-29 RX ORDER — HYDROCODONE BITARTRATE AND ACETAMINOPHEN 5; 325 MG/1; MG/1
1 TABLET ORAL EVERY 4 HOURS PRN
Status: DISCONTINUED | OUTPATIENT
Start: 2020-06-29 | End: 2020-07-01 | Stop reason: HOSPADM

## 2020-06-29 RX ORDER — FENTANYL CITRATE 50 UG/ML
INJECTION, SOLUTION INTRAMUSCULAR; INTRAVENOUS
Status: COMPLETED | OUTPATIENT
Start: 2020-06-29 | End: 2020-06-29

## 2020-06-29 RX ORDER — HYDROCODONE BITARTRATE AND ACETAMINOPHEN 5; 325 MG/1; MG/1
2 TABLET ORAL EVERY 4 HOURS PRN
Status: DISCONTINUED | OUTPATIENT
Start: 2020-06-29 | End: 2020-07-01 | Stop reason: HOSPADM

## 2020-06-29 RX ORDER — CLOPIDOGREL BISULFATE 75 MG/1
75 TABLET ORAL DAILY
Status: DISCONTINUED | OUTPATIENT
Start: 2020-06-30 | End: 2020-07-01 | Stop reason: HOSPADM

## 2020-06-29 RX ORDER — HEPARIN SODIUM 5000 [USP'U]/ML
INJECTION, SOLUTION INTRAVENOUS; SUBCUTANEOUS
Status: COMPLETED | OUTPATIENT
Start: 2020-06-29 | End: 2020-06-29

## 2020-06-29 RX ORDER — SODIUM CHLORIDE 9 MG/ML
INJECTION, SOLUTION INTRAVENOUS CONTINUOUS
Status: DISCONTINUED | OUTPATIENT
Start: 2020-06-29 | End: 2020-07-01 | Stop reason: HOSPADM

## 2020-06-29 RX ORDER — POTASSIUM CHLORIDE 750 MG/1
CAPSULE, EXTENDED RELEASE ORAL
COMMUNITY
Start: 2020-06-03 | End: 2020-06-29

## 2020-06-29 RX ORDER — IODIXANOL 320 MG/ML
INJECTION, SOLUTION INTRAVASCULAR
Status: COMPLETED | OUTPATIENT
Start: 2020-06-29 | End: 2020-06-29

## 2020-06-29 RX ORDER — ACETAMINOPHEN 325 MG/1
650 TABLET ORAL EVERY 4 HOURS PRN
Status: DISCONTINUED | OUTPATIENT
Start: 2020-06-29 | End: 2020-07-01 | Stop reason: HOSPADM

## 2020-06-29 RX ORDER — CLOPIDOGREL BISULFATE 75 MG/1
150 TABLET ORAL ONCE
Status: COMPLETED | OUTPATIENT
Start: 2020-06-29 | End: 2020-06-29

## 2020-06-29 RX ORDER — CEFAZOLIN SODIUM 1 G/50ML
1 INJECTION, SOLUTION INTRAVENOUS ONCE
Status: COMPLETED | OUTPATIENT
Start: 2020-06-29 | End: 2020-06-29

## 2020-06-29 RX ADMIN — MIDAZOLAM 0.5 MG: 1 INJECTION INTRAMUSCULAR; INTRAVENOUS at 09:52

## 2020-06-29 RX ADMIN — MIDAZOLAM 0.5 MG: 1 INJECTION INTRAMUSCULAR; INTRAVENOUS at 10:58

## 2020-06-29 RX ADMIN — MIDAZOLAM 0.5 MG: 1 INJECTION INTRAMUSCULAR; INTRAVENOUS at 11:43

## 2020-06-29 RX ADMIN — CLOPIDOGREL BISULFATE 150 MG: 75 TABLET ORAL at 12:55

## 2020-06-29 RX ADMIN — MIDAZOLAM 0.5 MG: 1 INJECTION INTRAMUSCULAR; INTRAVENOUS at 10:37

## 2020-06-29 RX ADMIN — ACETAMINOPHEN 650 MG: 325 TABLET, FILM COATED ORAL at 13:44

## 2020-06-29 RX ADMIN — IODIXANOL 185 ML: 320 INJECTION, SOLUTION INTRAVASCULAR at 11:54

## 2020-06-29 RX ADMIN — HEPARIN SODIUM 5000 UNITS: 5000 INJECTION, SOLUTION INTRAVENOUS; SUBCUTANEOUS at 09:52

## 2020-06-29 RX ADMIN — FENTANYL CITRATE 25 MCG: 50 INJECTION, SOLUTION INTRAMUSCULAR; INTRAVENOUS at 11:40

## 2020-06-29 RX ADMIN — FENTANYL CITRATE 25 MCG: 50 INJECTION, SOLUTION INTRAMUSCULAR; INTRAVENOUS at 09:52

## 2020-06-29 RX ADMIN — FENTANYL CITRATE 25 MCG: 50 INJECTION, SOLUTION INTRAMUSCULAR; INTRAVENOUS at 10:37

## 2020-06-29 RX ADMIN — FENTANYL CITRATE 25 MCG: 50 INJECTION, SOLUTION INTRAMUSCULAR; INTRAVENOUS at 10:50

## 2020-06-29 RX ADMIN — HYDRALAZINE HYDROCHLORIDE 5 MG: 20 INJECTION INTRAMUSCULAR; INTRAVENOUS at 11:19

## 2020-06-29 RX ADMIN — HEPARIN SODIUM 6000 UNITS: 5000 INJECTION, SOLUTION INTRAVENOUS; SUBCUTANEOUS at 10:12

## 2020-06-29 RX ADMIN — SODIUM CHLORIDE: 9 INJECTION, SOLUTION INTRAVENOUS at 07:18

## 2020-06-29 RX ADMIN — HEPARIN SODIUM 1000 UNITS: 5000 INJECTION, SOLUTION INTRAVENOUS; SUBCUTANEOUS at 11:12

## 2020-06-29 RX ADMIN — MIDAZOLAM 0.5 MG: 1 INJECTION INTRAMUSCULAR; INTRAVENOUS at 10:01

## 2020-06-29 RX ADMIN — FENTANYL CITRATE 25 MCG: 50 INJECTION, SOLUTION INTRAMUSCULAR; INTRAVENOUS at 11:11

## 2020-06-29 RX ADMIN — HYDRALAZINE HYDROCHLORIDE 5 MG: 20 INJECTION INTRAMUSCULAR; INTRAVENOUS at 11:49

## 2020-06-29 RX ADMIN — Medication: at 07:54

## 2020-06-29 RX ADMIN — FENTANYL CITRATE 25 MCG: 50 INJECTION, SOLUTION INTRAMUSCULAR; INTRAVENOUS at 10:58

## 2020-06-29 RX ADMIN — MIDAZOLAM 0.5 MG: 1 INJECTION INTRAMUSCULAR; INTRAVENOUS at 11:11

## 2020-06-29 RX ADMIN — CEFAZOLIN SODIUM 1 G: 1 INJECTION, SOLUTION INTRAVENOUS at 09:27

## 2020-06-29 ASSESSMENT — PAIN SCALES - GENERAL: PAINLEVEL_OUTOF10: 6

## 2020-06-29 NOTE — OP NOTE
placed in the left common femoral artery. Selective left lower extremity arteriograms were performed with the above findings. Utilizing an 035 wire, a Navicross catheter, Omena catheter, and finally an 014 command wire, I was able to traverse the high-grade stenoses and chronic total occlusion of the superficial femoral artery. I verified I was in the true lumen in the above-knee popliteal artery with puff angiography. Because of the severely calcified plaque and occlusion, I had to sequentially dilate the superficial femoral and pop popliteal artery stenoses/occlusions. I could not pass any larger balloon initially. Therefore, balloon angioplasty was performed of the superficial femoral and popliteal artery above the knee with 3.5 mm x 120 mm coyote balloon. This is followed with a 5 mm x 150 mm Mike balloon. Finally, balloon angioplasty was performed with a 6 mm x 150 mm  balloon. One area was resistant to balloon angioplasty so balloon angioplasty was performed of the distal superficial femoral artery with a 6 mm x 40 mm high-pressure conquest balloon. Arteriograms after this show a good result with no significant residual stenosis. However, the plaque is so severe that I think it will occlude fairly quickly with balloon angioplasty alone. Left superficial femoral and above-knee popliteal artery stents were placed. The distal stent is a 5.5 x 150, the mid superficial femoral artery stent is a 5.5 x 120, and the proximal superficial femoral artery stent is a 6 mm x 120 mm drug-coated stent. Balloon angioplasty was performed within the stents with a 6 mm x 150 mm  balloon. Completion left lower extremity arteriograms were performed. There is no flow limiting's dissection nor significant residual stenosis. There is no sign for distal embolization. The right common femoral artery sheath was removed and the puncture site closed with a minx device.   The patient tolerated the procedure well. He was brought back to cath holding in good condition.

## 2020-07-13 ENCOUNTER — OFFICE VISIT (OUTPATIENT)
Dept: VASCULAR SURGERY | Age: 80
End: 2020-07-13
Payer: MEDICARE

## 2020-07-13 VITALS
WEIGHT: 179 LBS | DIASTOLIC BLOOD PRESSURE: 59 MMHG | BODY MASS INDEX: 24.24 KG/M2 | SYSTOLIC BLOOD PRESSURE: 143 MMHG | RESPIRATION RATE: 16 BRPM | OXYGEN SATURATION: 94 % | HEART RATE: 60 BPM | HEIGHT: 72 IN | TEMPERATURE: 97.9 F

## 2020-07-13 PROCEDURE — 99212 OFFICE O/P EST SF 10 MIN: CPT | Performed by: PHYSICIAN ASSISTANT

## 2020-07-13 NOTE — PROGRESS NOTES
Patient Care Team:  Yisel Lopez DO as PCP - General (Family Medicine)  Anahy Soni MD (Cardiology)  Radha Smart MD as Consulting Physician (Vascular Surgery)  Rashid Loya DO as Consulting Physician (Neurosurgery)  KANCHAN Martinez as Nurse Practitioner (Family Nurse Practitioner)      Mr. Cassi Diego  presents for follow up after an Arteriogram with BA/stent left SFA/Polpiteal arteries done on 6/29/2020 by Jennifer Eastman. Procedure was done for peripheral vascular disease with claudication. Post op problems include none. Angiogram complications were none. Post op pain and swelling are minimal. He reports that his right leg is doing good. He reports occasional burning type pain in his left heel that is some better. He is on Plavix and a statin daily. He reports that the BLE sweling is much better.      Dee Yadav is a 79 y.o. male with the following history reviewed and [facility]:  Patient Active Problem List    Diagnosis Date Noted    CAD (coronary artery disease)      Priority: High    Atherosclerosis of lower extremity with claudication ([address] 06/29/2020    PVD (peripheral vascular disease) ([address]     Spasm of piriformis muscle 01/28/2019    Myofascial pain 10/23/2018    Sacroiliitis ([address] 10/23/2018    Lumbar radiculopathy 10/23/2018    Vitamin D deficiency 08/13/2018    Groin abscess     Postprocedural wound infection 05/22/2018    Atherosclerosis of artery of extremity with ulceration ([address] 04/24/2018    Lumbar stenosis with neurogenic claudication 12/20/2017    Compression fracture of L4 lumbar vertebra 09/22/2017    S/P kyphoplasty 09/22/2017    Abnormal nuclear cardiac imaging test     SOB (shortness of breath) 04/25/2017    Fatigue 04/25/2017    Iron deficiency anemia 04/25/2017    History of amiodarone therapy 04/25/2017    Syncope     Carotid artery stenosis 04/21/2017    Chronic kidney disease (CKD), stage III (moderate) (HCC)     NSVT (nonsustained ventricular tachycardia) [facility]     S/P CABG x 6     Sustained VT (ventricular tachycardia) ([facility])     Ventricular tachyarrhythmia ([facility])     VT (ventricular tachycardia) ([facility] 04/27/2015    AICD (automatic cardioverter/defibrillator) present 04/27/2015    Hypercholesterolemia     Hypertension     COPD (chronic obstructive pulmonary disease) ([facility])     Anxiety     Depression      Current Outpatient Medications   Medication Sig Dispense Refill    acetylcysteine (MUCOMYST) 10 % nebulizer solution Procedure scheduled for 6/29/20. Mucomyst 600mg po bid the day before procedure, the day of procedure, the day after procedure.  1 mL 0    pantoprazole (PROTONIX) 40 MG tablet Take 1 tablet by mouth daily 30 tablet 5    magnesium (MAGNESIUM-OXIDE) 250 MG TABS tablet Take 250 mg by mouth 2 times daily      calcipotriene (DOVONEX) 0.005 % cream       furosemide (LASIX) 40 MG tablet Take 40 mg by mouth 2 times daily      furosemide (LASIX) 20 MG tablet Take 20 mg by mouth 2 times daily Total of 60 mg (20 mg tablet & 40 mg tablet      Cholecalciferol (VITAMIN D3) 50 MCG (2000 UT) CAPS Take 1 capsule by mouth daily      clopidogrel (PLAVIX) 75 MG tablet Take 1 tablet by mouth daily 30 tablet 5    traZODone (DESYREL) 50 MG tablet Take 50 mg by mouth daily      Tiotropium Bromide-Olodaterol (STIOLTO RESPIMAT) 2.5-2.5 MCG/ACT AERS Inhale into the lungs 2 puffs daily      Ferrous Sulfate (IRON) 325 (65 Fe) MG TABS Take by mouth daily      guaiFENesin (MUCINEX) 600 MG extended release tablet Take 600 mg by mouth 2 times daily       DULoxetine (CYMBALTA) 60 MG extended release capsule Take 60 mg by mouth daily      Calcium Carb-Cholecalciferol (CALCIUM 600+D) 600-800 MG-UNIT TABS Take by mouth      Multiple Vitamins-Minerals (THERAPEUTIC MULTIVITAMIN-MINERALS) tablet Take 1 tablet by mouth daily      docusate sodium (COLACE) 100 MG capsule Take 1 capsule by mouth 2 times daily as needed for Constipation 30 capsule 1 transfusion 01/2020    Hypercholesterolemia     Hypertension     Iron deficiency     hx iron infusions    MDRO (multiple drug resistant organisms) resistance 2018    post arteriogram    Thyroid disease     Ulcer 09/08/2010    Rectum.  Ventricular tachyarrhythmia [facility])      Past Surgical History:   Procedure Laterality Date    BACK SURGERY  08/17/2017    CARDIAC CATHETERIZATION  4/22/15  JDT    EF 50%    CARDIAC DEFIBRILLATOR PLACEMENT  04/24/2015    AICD    CARDIAC SURGERY  1/06    Open Heart Surgery (x6)    CATARACT REMOVAL  05    COLON SURGERY  6/26/04    Colon reversal (10/18/2004); Colon re-reversal (10/26/2004).  EYE SURGERY  2019    EYE SURGERY  2005    HERNIA REPAIR  10/26/2006    Ileostomy reversal and hernia repair.  KNEE SURGERY  1996    LAMINEC/FACETECT/FORAMIN,CERVICAL 1 SEG N/A 12/20/2017    L3-4 decompressive laminectomy using minimally invasive technique performed by Luisito Eagle DO at [address]  11/2004    Bleeding ulcers.  OTHER SURGICAL HISTORY  02/14/2007    Debridement of abdominal/with pulse irrigation.  [place] OFFICE/OUTPT VISIT,PROCEDURE ONLY N/A 8/20/2018    kyphoplasty of L5 performed by Luisito Eagle DO at [facility] 11 BL VES DIRECT,LOW EXTREM Right 5/23/2018    RIGHT GROIN, DEBRIDEMENT AND MUSCLE FLAP  performed by Samson Trammell MD at [address] [facility] Right 4/24/2018    LOWER EXTREMITY ANGIOGRAM, BALLOON ANGIOPLASTY, ATHERECTOMY performed by Samson Trammell MD at [address] Right 4/24/2018    COMMON FEMORAL ARTERY ENDARTERECTOMY performed by Samson Trammell MD at [place] [address]  05/30/2005    RETINAL DETACHMENT SURGERY  06/21/2005    VASCULAR SURGERY  06/05/2017    SJS. Left CFA 5f-6-f destination,aortogram with bilateral lower arteriogram,right SFA/Pop atherectomy CSI 1.25 solid,right SFA /pop balloon balloon angioplasty 6x150 mynx left CFA.  VASCULAR SURGERY Right 18 SJS    Rigth common femoral/superficial femoral artery endarterectomy with right GSV patch, Left CFA 5F-6F sheath, RLE Angiogram, Right SFA/Popliteal balloon atherectomy with CSI 2.0 solid crown, Right Popliteal balloon angoplasty 5x150 lutonix, Right SFA Balloon Angioplasty 6x100 lutonix,    VASCULAR SURGERY  18 SJS    1. Right common femoral/superficial femoral artery endarterectomy with (R) GSV Patch. 2. Left CFA 5f-6f sheath. 3. RLE A'gram. 4. (R) SFA/popliteal atherectomy with CSI 2.0 solid crown. 5.(R) popliteal balloon angioplasty 5x150 lutonix 6. (R)SFA Balloon a'plasty 6x250, 6x100 LUTONIX 7. Completion RLE A'gram 8. (L) CFA MYNX     Family History   Problem Relation Age of Onset    Hypertension Mother     Diabetes Father     Dementia Father     Cancer Father     Colon Cancer Father     Hypertension Father     Cancer Brother     Esophageal Cancer Sister      Social History     Tobacco Use    Smoking status: Former Smoker     Packs/day: 1.50     Years: 50.00     Pack years: 75.00     Types: Cigarettes     Last attempt to quit: 2015     Years since quittin.2    Smokeless tobacco: Never Used   Substance Use Topics    Alcohol use: Not Currently       Review of Systems    Constitutional - no significant activity change, appetite change, or unexpected weight change. No fever or chills. No diaphoresis or significant fatigue. HENT - no significant rhinorrhea or epistaxis. No tinnitus or significant hearing loss. Eyes - no sudden vision change or amaurosis. Respiratory - no significant shortness of breath, wheezing, or stridor. No apnea, cough, or chest tightness associated with shortness of breath. Cardiovascular - no chest pain, syncope, or significant dizziness. No palpitations. Bilateral leg swelling (improved) (see HPI)  Gastrointestinal - no abdominal swelling or pain. No blood in stool.   No severe constipation, appears normal.  No significant edema. Neurologic - alert and oriented X 3. Physiologic. Skin - warm, dry, and intact. No rash, erythema, or pallor. Psychiatric - mood, affect, and behavior appear normal.  Judgment and thought processes appear normal.    Risk factors for atherosclerosis of all vascular beds have been reviewed with the patient including:  Family history, tobacco abuse in all forms, elevated cholesterol, hyperlipidemia, and diabetes. Assessment      1.  Athersclerosis of bilateral LE native arteries with claudication      Plan        Start/Continue Plavix 75 mg daily  Strongly encourage he continue statin therapy  Good moisturization  Good skin care  Recommend no smoking     Follow up in 3 months with a left A'scan with RENAY's  or sooner if claudication worsens/develops, develops new wound or IRP        Kerline - Lani Davis DO

## 2020-07-20 ENCOUNTER — TELEPHONE (OUTPATIENT)
Dept: CARDIOLOGY | Age: 80
End: 2020-07-20

## 2020-07-20 NOTE — TELEPHONE ENCOUNTER
Pt called wanting results of his stress echo and stress test. Pt states they were done at [facility] and will call there and have them sent to us. Spoke to [facility] and requested them to send the echo and stress test. They stated they would.

## 2020-07-27 NOTE — TELEPHONE ENCOUNTER
Scanned reports to media. Patient has appt with Dr. Jackie Mazariegos on 7/30/20. Renetta- can he review with results with patient at that time? Results haven't been interpreted yet by provider and they were ordered by Dr. Anika Germain.

## 2020-07-27 NOTE — TELEPHONE ENCOUNTER
Patients wife called this morning for results. Dont see them in chart. Do you have these in your scan pile.  Ordered by Madhuri Crawley

## 2020-07-27 NOTE — TELEPHONE ENCOUNTER
No I don't have these, will probably have to call and get results faxed to us and then have one of the NP review them.

## 2020-07-28 ENCOUNTER — TELEPHONE (OUTPATIENT)
Dept: CARDIOLOGY | Age: 80
End: 2020-07-28

## 2020-07-28 NOTE — TELEPHONE ENCOUNTER
Spoke with Pt's spouse and discussed results. Spouse voiced her understanding.         ----- Message from Victorino Minor DO sent at 7/27/2020  4:38 PM CDT -----  Regarding: Orquidea Garcia [id number]  This patient had a pharmacologic nuclear stress test and echogram earlier this month at [facility]. The stress test is normal with no evidence for blocked arteries in the echocardiogram has some fairly minor abnormalities. Please inform the patient of this and explained to him that I will discuss it and explain it in more detail when he comes to see me this week.

## 2020-07-30 ENCOUNTER — OFFICE VISIT (OUTPATIENT)
Dept: CARDIOLOGY | Age: 80
End: 2020-07-30
Payer: MEDICARE

## 2020-07-30 VITALS
WEIGHT: 184 LBS | HEIGHT: 72 IN | DIASTOLIC BLOOD PRESSURE: 68 MMHG | BODY MASS INDEX: 24.92 KG/M2 | SYSTOLIC BLOOD PRESSURE: 138 MMHG | HEART RATE: 62 BPM | OXYGEN SATURATION: 93 %

## 2020-07-30 PROBLEM — I50.23 ACUTE ON CHRONIC SYSTOLIC HEART FAILURE (HCC): Status: ACTIVE | Noted: 2020-07-30

## 2020-07-30 PROBLEM — I25.5 ISCHEMIC CARDIOMYOPATHY: Status: ACTIVE | Noted: 2020-07-30

## 2020-07-30 PROCEDURE — 93000 ELECTROCARDIOGRAM COMPLETE: CPT | Performed by: INTERNAL MEDICINE

## 2020-07-30 PROCEDURE — 99214 OFFICE O/P EST MOD 30 MIN: CPT | Performed by: INTERNAL MEDICINE

## 2020-07-30 ASSESSMENT — ENCOUNTER SYMPTOMS
BLOOD IN STOOL: 1
SHORTNESS OF BREATH: 1
COUGH: 0

## 2020-07-30 NOTE — PROGRESS NOTES
Office Visit  Josefina Hargrove is a 79 y.o. male; who present today for Follow-up (6 week follow up;NTP)      HPI  I am seeing this 81-year-old white male in office follow-up as first time I am seeing him as a new cardiologist.  I spent a lot of time reviewing his records talking to the patient and also his daughter who has accompanied him and in addition spoke with his wife by telephone at the same time. This is a gentleman who has coronary artery disease with vessel bypass graft surgery in 2006. According to his last heart catheterization from May 2017 it was reported that he had normal LV function and patency of the left arm graft to the LAD and vein graft to the obtuse marginal and right coronary arteries, suggesting that the other grafts were occluded but it appeared that medical therapy was the direction. Hospitalized at [facility] in May and according to the patient, his wife and daughter he was in congestive heart failure and got significantly better with resolution of his lower extremity edema. He does not get anginal chest pain but he did undergo a pharmacologic nuclear stress test at [facility] and an echocardiogram last several weeks and though pharmacologic nuclear stress test was negative for scar or ischemia with normal LV wall motion and systolic function. Likewise, the echocardiogram demonstrated normal LV systolic function, mild LVH, mild right atrial and ventricular enlargement but normal right ventricular systolic function, consistent with his known COPD and moderate tricuspid vegetation with moderate pulmonary artery systolic pressure elevation, 52. Additionally, he has an ICD that was interrogated today demonstrating normal function. It is a single lead and is not required any treatment for ventricular tachycardia. He is on amiodarone.   He also has a history of recurrent anemia for which she requires transfusions on a fairly regular basis and he states that he is followed with a CBC on about a weekly basis. Function testing was ordered in May but apparently we do not have the results. His daughter will check to see if they were done at [facility] purchase otherwise they need to be checked soon. Denies palpitations no presyncope or syncope. He does have lower extremity edema but states that it is mostly baseline for him. Current Outpatient Medications   Medication Sig Dispense Refill    acetylcysteine (MUCOMYST) 10 % nebulizer solution Procedure scheduled for 6/29/20. Mucomyst 600mg po bid the day before procedure, the day of procedure, the day after procedure.  1 mL 0    pantoprazole (PROTONIX) 40 MG tablet Take 1 tablet by mouth daily 30 tablet 5    magnesium (MAGNESIUM-OXIDE) 250 MG TABS tablet Take 250 mg by mouth 2 times daily      calcipotriene (DOVONEX) 0.005 % cream       furosemide (LASIX) 40 MG tablet Take 40 mg by mouth 2 times daily      furosemide (LASIX) 20 MG tablet Take 20 mg by mouth 2 times daily Total of 60 mg (20 mg tablet & 40 mg tablet      Cholecalciferol (VITAMIN D3) 50 MCG (2000 UT) CAPS Take 1 capsule by mouth daily      clopidogrel (PLAVIX) 75 MG tablet Take 1 tablet by mouth daily 30 tablet 5    traZODone (DESYREL) 50 MG tablet Take 50 mg by mouth daily      Tiotropium Bromide-Olodaterol (STIOLTO RESPIMAT) 2.5-2.5 MCG/ACT AERS Inhale into the lungs 2 puffs daily      Ferrous Sulfate (IRON) 325 (65 Fe) MG TABS Take by mouth daily      guaiFENesin (MUCINEX) 600 MG extended release tablet Take 600 mg by mouth 2 times daily       DULoxetine (CYMBALTA) 60 MG extended release capsule Take 60 mg by mouth daily      Calcium Carb-Cholecalciferol (CALCIUM 600+D) 600-800 MG-UNIT TABS Take by mouth      Multiple Vitamins-Minerals (THERAPEUTIC MULTIVITAMIN-MINERALS) tablet Take 1 tablet by mouth daily      docusate sodium (COLACE) 100 MG capsule Take 1 capsule by mouth 2 times daily as needed for Constipation 30 capsule 1 failure) ([facility]     Chronic kidney disease     Chronic kidney disease (CKD), stage III (moderate) (HCC)     COPD (chronic obstructive pulmonary disease) ([facility]     Depression     Gastric ulcer 11/06/2004    multiple, duodenal ulcer - Dr. Berrios Gathers History of blood transfusion 01/2020    Hypercholesterolemia     Hypertension     Iron deficiency     hx iron infusions    MDRO (multiple drug resistant organisms) resistance 2018    post arteriogram    Thyroid disease     Ulcer 09/08/2010    Rectum.  Ventricular tachyarrhythmia ([facility]      Negative - Past Medical History for  Past Medical History Pertinent Negatives:   Diagnosis Date Noted    Cancer ([facility] 11/07/2018    Cerebral artery occlusion with cerebral infarction ([facility] 11/07/2018    Diabetes mellitus ([facility] 11/07/2018    Difficult intubation 05/13/2020    Hemodialysis patient ([facility] 11/07/2018    Hx of blood clots 11/07/2018    Malignant hyperthermia 05/13/2020    PONV (postoperative nausea and vomiting) 04/17/2018    Prolonged emergence from general anesthesia 05/13/2020    Retention of urine 05/13/2020    Sleep apnea 05/22/2018    Spinal headache 05/13/2020       Past Surgical History:   Procedure Laterality Date    BACK SURGERY  08/17/2017    CARDIAC CATHETERIZATION  4/22/15  JDT    EF 50%    CARDIAC DEFIBRILLATOR PLACEMENT  04/24/2015    AICD    CARDIAC SURGERY  1/06    Open Heart Surgery (x6)    CATARACT REMOVAL  05    COLON SURGERY  6/26/04    Colon reversal (10/18/2004); Colon re-reversal (10/26/2004).  EYE SURGERY  2019    EYE SURGERY  2005    HERNIA REPAIR  10/26/2006    Ileostomy reversal and hernia repair.  KNEE SURGERY  1996    LAMINEC/FACETECT/FORAMIN,CERVICAL 1 SEG N/A 12/20/2017    L3-4 decompressive laminectomy using minimally invasive technique performed by Candido Daniel DO at [address]  11/2004    Bleeding ulcers.     OTHER SURGICAL HISTORY  02/14/2007    Debridement of abdominal/with pulse irrigation.  NM OFFICE/OUTPT VISIT,PROCEDURE ONLY N/A 2018    [facility] DIRECT,LOW EXTREM Right 2018    RIGHT GROIN, DEBRIDEMENT AND MUSCLE FL[facility]  [facility] Right 2018    LOWER EXTREMITY ANGIOGRAM, BALLOON ANGIOPLASTY, ATHERECTOMY performed by Latricia Cowden, MD at [address] Right 2018    COMMON FEMORAL ARTERY ENDARTERECTOMY performed by Latricia Cowden, MD at [place] [address]  2005    RETINAL DETACHMENT SURGERY  2005    VASCULAR SURGERY  2017    SJS. Left CFA 5f-6-f destination,aortogram with bilateral lower arteriogram,right SFA/Pop atherectomy CSI 1.25 solid,right SFA /pop balloon balloon angioplasty 6x150 mynx left CFA.  VASCULAR SURGERY Right 18 SJS    Rigth common femoral/superficial femoral artery endarterectomy with right GSV patch, Left CFA 5F-6F sheath, RLE Angiogram, Right SFA/Popliteal balloon atherectomy with CSI 2.0 solid crown, Right Popliteal balloon angoplasty 5x150 lutonix, Right SFA Balloon Angioplasty 6x100 lutonix,    VASCULAR SURGERY  18 SJS    1. Right common femoral/superficial femoral artery endarterectomy with (R) GSV Patch. 2. Left CFA 5f-6f sheath. 3. RLE A'gram. 4. (R) SFA/popliteal atherectomy with CSI 2.0 solid crown. 5.(R) popliteal balloon angioplasty 5x150 lutonix 6. (R)SFA Balloon a'plasty 6x250, 6x100 LUTONIX 7. Completion RLE A'gram 8.  (L) CFA MYNX     Social History     Occupational History    Not on file   Tobacco Use    Smoking status: Former Smoker     Packs/day: 1.50     Years: 50.00     Pack years: 75.00     Types: Cigarettes     Last attempt to quit: 2015     Years since quittin.2    Smokeless tobacco: Never Used   Substance and Sexual Activity    Alcohol use: Not Currently    Drug use: No    Sexual activity: Not Currently     Partners: Female        Family History   Problem Relation Age of Onset    Hypertension Mother     Diabetes Father     Dementia Father     Cancer Father     Colon Cancer Father     Hypertension Father     Cancer Brother     Esophageal Cancer Sister        Review of Systems  Review of Systems   Constitutional: Negative for fever. Respiratory: Positive for shortness of breath. Negative for cough. Gastrointestinal: Positive for blood in stool (Occasional). Neurological: Negative. Physical Exam  /68   Pulse 62   Ht 6' (1.829 m)   Wt 184 lb (83.5 kg)   SpO2 93%   BMI 24.95 kg/m²    Physical Exam  Constitutional:       Appearance: Normal appearance. He is normal weight. Cardiovascular:      Rate and Rhythm: Normal rate and regular rhythm. Heart sounds: Normal heart sounds. No gallop. Comments: Mild neck vein distention is noted  Pulmonary:      Effort: Pulmonary effort is normal.      Breath sounds: Rhonchi (Overall lung aeration is fair to poor with mild basilar rhonchi, no rales) present. Abdominal:      General: Abdomen is flat. Bowel sounds are normal.      Palpations: Abdomen is soft. Musculoskeletal:         General: No swelling. Right lower le+ Pitting Edema present. Left lower le+ Pitting Edema present. Skin:     General: Skin is warm and dry. Neurological:      Mental Status: He is alert. Assessment/Plan    EKG Findings:  Normal sinus rhythm, rightward axis, right bundle branch block, unchanged from baseline EKG    Problem List Items Addressed This Visit        Cardiology Problems    3-vessel coronary artery disease    Hypercholesterolemia    Hypertension    Relevant Orders    EKG 12 lead (Completed)    Acute on chronic systolic heart failure (HCC) - Primary    Ischemic cardiomyopathy       Other    Implantable cardioverter-defibrillator (ICD) in situ           Diagnosis Orders   1.  Acute on chronic systolic heart failure (HCC)      Diastolic suspected, uncertain but likely acute on chronic   2. Ischemic cardiomyopathy      Remote history of severe LV systolic dysfunction, normalized more recently, normal LV systolic function by echocardiography, July 2020   3. 3-vessel coronary artery disease      Status post CABG x6, 2006. The patent graft by cardiac catheterization, May 2017. Negative pharmacologic nuclear stress test, July 2020   4. Implantable cardioverter-defibrillator (ICD) in situ      Single RV lead, VVI mode   5. Essential hypertension  EKG 12 lead   6. Hypercholesterolemia         Recommendations:   Diet: Low-sodium, low-fat diet  Activity: Low levels of activity  Medication Changes: Increase Lasix to 80 mg twice daily for the next 3 days and then resume 80 mg in the morning and 40 mg at night; continue other medications as prescribed      This patient is complex but he clearly has worsening dyspnea in the recent weeks or month or so. Although there is not strong objective evidence for congestive heart failure he probably has mild acute decompensation and I have increased his Lasix for 3 days. They are to notify me if his symptoms get worse or go to the emergency room. Not any better by Monday then they may need to see pulmonary and get a chest x-ray. His daughter will check on TSH and if it has not been drawn and we will get one soon because of amiodarone with a desire to check a TSH every 6 months. Lastly, since he is on amiodarone we may need to entertain evaluation for pulmonary toxicity if his breathing does not improve. I spoke with his pulmonologist at [facility], Dr Devon Kennedy. No orders of the defined types were placed in this encounter. Orders Placed This Encounter   Procedures    EKG 12 lead     Order Specific Question:   Reason for Exam?     Answer: Other       Return in about 4 weeks (around 8/27/2020) for me, chf follow up.

## 2020-08-05 ENCOUNTER — TELEPHONE (OUTPATIENT)
Dept: CARDIOLOGY | Age: 80
End: 2020-08-05

## 2020-08-25 ENCOUNTER — OFFICE VISIT (OUTPATIENT)
Dept: CARDIOLOGY | Age: 80
End: 2020-08-25
Payer: MEDICARE

## 2020-08-25 VITALS
BODY MASS INDEX: 25.91 KG/M2 | SYSTOLIC BLOOD PRESSURE: 116 MMHG | HEART RATE: 65 BPM | DIASTOLIC BLOOD PRESSURE: 76 MMHG | OXYGEN SATURATION: 97 % | WEIGHT: 181 LBS | HEIGHT: 70 IN

## 2020-08-25 PROBLEM — I50.32 CHRONIC DIASTOLIC (CONGESTIVE) HEART FAILURE (HCC): Status: ACTIVE | Noted: 2020-08-25

## 2020-08-25 PROCEDURE — 99213 OFFICE O/P EST LOW 20 MIN: CPT | Performed by: INTERNAL MEDICINE

## 2020-08-25 ASSESSMENT — ENCOUNTER SYMPTOMS
SHORTNESS OF BREATH: 1
BLOOD IN STOOL: 1

## 2020-08-25 NOTE — PROGRESS NOTES
Office Visit  Michalene Goldberg is a 79 y.o. male; who present today for Follow-up and Congestive Heart Failure      HPI  I am seeing this 50-year-old white male in follow-up who was felt to have acute diastolic congestive heart failure, probably predominantly or exclusively related to time of the last visit in July. His chronic lung disease and I spoke with pulmonary and as I recall from the conversation his lung disease is rather advanced and has some abnormal chest x-ray supportive interstitial changes. Increased his Lasix for 3 days at that time and he did have improvement in his edema but it is about the same. He is with his daughter again and his wife was on telephone with me during the visit again today. She oversees most of his care and knows his history and day-to-day activities and symptoms. He has not been having any chest discomfort at all. Denies any palpitations, no presyncope or syncope. He is on amiodarone for a long time and he required it because of episodes of ventricular tachycardia, getting shocked from his defibrillator number of years ago but has not had any shocks in quite some time. Current Outpatient Medications   Medication Sig Dispense Refill    acetylcysteine (MUCOMYST) 10 % nebulizer solution Procedure scheduled for 6/29/20. Mucomyst 600mg po bid the day before procedure, the day of procedure, the day after procedure.  1 mL 0    pantoprazole (PROTONIX) 40 MG tablet Take 1 tablet by mouth daily 30 tablet 5    magnesium (MAGNESIUM-OXIDE) 250 MG TABS tablet Take 250 mg by mouth 2 times daily      calcipotriene (DOVONEX) 0.005 % cream       furosemide (LASIX) 40 MG tablet Take 40 mg by mouth 2 times daily      furosemide (LASIX) 20 MG tablet Take 20 mg by mouth 2 times daily Total of 60 mg (20 mg tablet & 40 mg tablet      Cholecalciferol (VITAMIN D3) 50 MCG (2000 UT) CAPS Take 1 capsule by mouth daily      clopidogrel (PLAVIX) 75 MG tablet Take 1 tablet by mouth daily 30 tablet 5    traZODone (DESYREL) 50 MG tablet Take 50 mg by mouth daily      Tiotropium Bromide-Olodaterol (STIOLTO RESPIMAT) 2.5-2.5 MCG/ACT AERS Inhale into the lungs 2 puffs daily      Ferrous Sulfate (IRON) 325 (65 Fe) MG TABS Take by mouth daily      guaiFENesin (MUCINEX) 600 MG extended release tablet Take 600 mg by mouth 2 times daily       DULoxetine (CYMBALTA) 60 MG extended release capsule Take 60 mg by mouth daily      Calcium Carb-Cholecalciferol (CALCIUM 600+D) 600-800 MG-UNIT TABS Take by mouth      Multiple Vitamins-Minerals (THERAPEUTIC MULTIVITAMIN-MINERALS) tablet Take 1 tablet by mouth daily      docusate sodium (COLACE) 100 MG capsule Take 1 capsule by mouth 2 times daily as needed for Constipation 30 capsule 1    ondansetron (ZOFRAN) 4 MG tablet Take 1 tablet by mouth every 8 hours as needed for Nausea or Vomiting 30 tablet 1    Misc. Devices [facility]) MISC 1 each by Does not apply route daily 1 each 0    OXYGEN Inhale 4 L into the lungs as needed      atorvastatin (LIPITOR) 40 MG tablet TAKE 1 TABLET DAILY 90 tablet 3    amiodarone (CORDARONE) 200 MG tablet Take 1 tablet by mouth daily 90 tablet 3    isosorbide mononitrate (IMDUR) 60 MG extended release tablet Take 1 tablet by mouth daily 90 tablet 3    KLOR-CON M20 20 MEQ extended release tablet Take 20 mEq by mouth daily       albuterol (PROVENTIL) (2.5 MG/3ML) 0.083% nebulizer solution Take 2.5 mg by nebulization every 6 hours as needed for Wheezing       No current facility-administered medications for this visit. There are no discontinued medications.      Allergies   Allergen Reactions    Butamben-Tetracaine-Benzocaine Anaphylaxis     Cetacaine    Ativan [Lorazepam] Other (See Comments)     \"makes him wild\"    Bactrim      Causes hallucinations    Biaxin [Clarithromycin]     Clonidine      \"BP bottomed out\"    Demerol     Levaquin [Levofloxacin] Other (See Comments)     \"can't take with heart medicine\"    Lorazepam Other (See Comments)     \"makes him wild\"    Sulfa Antibiotics Rash       Past Medical History:   Diagnosis Date    AICD (automatic cardioverter/defibrillator) present 4/24/15    Anxiety     Arthritis     Asthma     Atherosclerosis of native artery of both lower extremities ([address] 5/6/2020    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 Zeb Myers CAD (coronary artery disease)     a. History of open-heart surgery X6, 01/12/06, utilizing the left VANCE to the LAD, sequential vein graft to the intermeditate and obtuse marginal, individually reverse saphenous vein graft to the acute maginal of the PDA and PLOM. b. Stress echocardiogram 04/22/08, negative for myocardial ischemia.  Carotid artery stenosis     CHF (congestive heart failure) (HCC)     Chronic kidney disease     Chronic kidney disease (CKD), stage III (moderate) (HCC)     COPD (chronic obstructive pulmonary disease) ([address]     Depression     Gastric ulcer 11/06/2004    multiple, duodenal ulcer - Dr. Abdirashid Smith History of blood transfusion 01/2020    Hypercholesterolemia     Hypertension     Iron deficiency     hx iron infusions    MDRO (multiple drug resistant organisms) resistance 2018    post arteriogram    Thyroid disease     Ulcer 09/08/2010    Rectum.     Ventricular tachyarrhythmia ([address]      Negative - Past Medical History for  Past Medical History Pertinent Negatives:   Diagnosis Date Noted    Cancer ([address] 11/07/2018    Cerebral artery occlusion with cerebral infarction ([address] 11/07/2018    Diabetes mellitus ([address] 11/07/2018    Difficult intubation 05/13/2020    Hemodialysis patient ([address] 11/07/2018    Hx of blood clots 11/07/2018    Malignant hyperthermia 05/13/2020    PONV (postoperative nausea and vomiting) 04/17/2018    Prolonged emergence from general anesthesia 05/13/2020    Retention of urine 05/13/2020    Sleep apnea 05/22/2018    Spinal headache 05/13/2020       Past Surgical History:   Procedure Laterality Date    BACK SURGERY  08/17/2017    CARDIAC CATHETERIZATION  4/22/15  JDT    EF 50%    CARDIAC DEFIBRILLATOR PLACEMENT  04/24/2015    AICD    CARDIAC SURGERY  1/06    Open Heart Surgery (x6)    CATARACT REMOVAL  05    COLON SURGERY  6/26/04    Colon reversal (10/18/2004); Colon re-reversal (10/26/2004).  EYE SURGERY  2019    EYE SURGERY  2005    HERNIA REPAIR  10/26/2006    Ileostomy reversal and hernia repair.  KNEE SURGERY  1996    LAMINEC/FACETECT/FORAMIN,CERVICAL 1 SEG N/A 12/20/2017    L3-4 decompressive laminectomy using minimally invasive technique performed by Christina Sousa DO at [address]  11/2004    Bleeding ulcers.  OTHER SURGICAL HISTORY  02/14/2007    Debridement of abdominal/with pulse irrigation.  [facility] OFFICE/OUTPT VISIT,PROCEDURE ONLY N/A 8/20/2018    [facility] L[facility] VES DIRECT,LOW EXTREM Right 5/23/2018    RIGHT GROIN, DEBRIDEMENT AND MUSCLE FLAP  performed by Liv Fuentes MD at [address] Right 4/24/2018    LOWER EXTREMITY ANGIOGRAM, BALLOON ANGIOPLASTY, ATHERECTOMY performed by Liv Fuentes MD at [address] Right 4/24/2018    COMMON FEMORAL ARTERY ENDARTERECTOMY performed by Liv Fuentes MD at [place] [address]  05/30/2005    RETINAL DETACHMENT SURGERY  06/21/2005    VASCULAR SURGERY  06/05/2017    S. Left CFA 5f-6-f destination,aortogram with bilateral lower arteriogram,right SFA/Pop atherectomy CSI 1.25 solid,right SFA /pop balloon balloon angioplasty 6x150 mynx left CFA.     VASCULAR SURGERY Right 04/24/18 SJS    Rig common femoral/superficial femoral artery endarterectomy with right GSV patch, Left CFA 5F-6F sheath, RLE Angiogram, Right SFA/Popliteal balloon atherectomy with CSI 2.0 solid crown, Right Popliteal balloon angoplasty 5x150 lutonix, Right SFA Balloon Angioplasty 6x100 lutonix,    VASCULAR SURGERY  18 SJS    1. Right common femoral/superficial femoral artery endarterectomy with (R) GSV Patch. 2. Left CFA 5f-6f sheath. 3. RLE A'gram. 4. (R) SFA/popliteal atherectomy with CSI 2.0 solid crown. 5.(R) popliteal balloon angioplasty 5x150 lutonix 6. (R)SFA Balloon a'plasty 6x250, 6x100 LUTONIX 7. Completion RLE A'gram 8. (L) CFA MYNX     Social History     Occupational History    Not on file   Tobacco Use    Smoking status: Former Smoker     Packs/day: 1.50     Years: 50.00     Pack years: 75.00     Types: Cigarettes     Last attempt to quit: 2015     Years since quittin.3    Smokeless tobacco: Never Used   Substance and Sexual Activity    Alcohol use: Not Currently    Drug use: No    Sexual activity: Not Currently     Partners: Female        Family History   Problem Relation Age of Onset    Hypertension Mother     Diabetes Father     Dementia Father     Cancer Father     Colon Cancer Father     Hypertension Father     Cancer Brother     Esophageal Cancer Sister        Review of Systems  Review of Systems   Constitutional: Negative for fever. Respiratory: Positive for shortness of breath. Cardiovascular: Positive for leg swelling. Negative for chest pain. Gastrointestinal: Positive for blood in stool. Musculoskeletal: Positive for arthralgias. Neurological: Negative. Physical Exam  /76   Pulse 65   Ht 5' 10\" (1.778 m)   Wt 181 lb (82.1 kg)   SpO2 97%   BMI 25.97 kg/m²    Physical Exam  Constitutional:       Appearance: Normal appearance. He is normal weight. Cardiovascular:      Rate and Rhythm: Normal rate and regular rhythm. Heart sounds: Normal heart sounds. No gallop. Pulmonary:      Effort: Pulmonary effort is normal.      Comments: Similar findings, fair overall aeration, mild rhonchi, no rales  Abdominal:      General: Abdomen is flat. Bowel sounds are normal.      Palpations: Abdomen is soft. Musculoskeletal:         General: No swelling. Right lower le+ Pitting Edema present. Left lower le+ Pitting Edema present. Skin:     General: Skin is warm and dry. Neurological:      Mental Status: He is alert. Assessment/Plan    EKG Findings:  EKG not performed today    1. Chronic diastolic heart failure          2. Ischemic cardiomyopathy        Remote history of severe LV systolic dysfunction, normalized more recently, normal LV systolic function by echocardiography, 2020   3. 3-vessel coronary artery disease        Status post CABG x6, . The patent graft by cardiac catheterization, May 2017. Negative pharmacologic nuclear stress test, 2020   4. Implantable cardioverter-defibrillator (ICD) in situ        Single RV lead, VVI mode   5. Essential hypertension     6. Hypercholesterolemia     7. Dyspnea on exertion, probably primarily pulmonary, consider amiodarone toxicity      Recommendations:   Diet: Low-sodium, low-fat diet  Activity: Low levels of activity  Medication Changes: Increase Lasix to 40 mg twice daily, continue other medications as prescribed      His family reports a TSH drawn by home health and we are requesting these results. I believe that his dyspnea is primarily on a pulmonary basis, not significant improvement with the increased diuretics but will increase the Lasix anyway to see how he does. Primary care is checking his chemistries on a daily basis according to his daughter who is a  at the facility where he has it done, [facility]. I believe that he needs to see pulmonary again and the idea of amiodarone toxicity needs to be entertained.     I subsequently received laboratory results on this patient from 2020 demonstrating normal TSH at 3.89 CBC with a hemoglobin of 10.9 mostly normal otherwise, blood sugar elevated 196, creatinine 1.7 and the rest of the chemistries basically normal.  No orders of the defined types were placed in this encounter. No orders of the defined types were placed in this encounter. Return in about 2 months (around 10/25/2020) for me, routine.

## 2020-10-08 ENCOUNTER — TELEPHONE (OUTPATIENT)
Dept: CARDIOLOGY | Age: 80
End: 2020-10-08

## 2020-10-08 NOTE — TELEPHONE ENCOUNTER
Date: 10/12/20    Cardiologist: Ang Garcia    Procedure: cataract    Surgeon: ophthalmology group    Last Office Visit: 8/25/20  Reason for office visit and medical concerns addressed at this office visit: cad, chf, ckd, htn, hyperlipidemia, pvd, cardiomyopathy    Testing Performed and Date of Service:  7/16/20 Kelsie negative for myocardial ischemia    RCRI = 6.6   METs 4    Current Medications: protonix, lasix, plavix, trazodone, cymbalta, amiodarone, lipitor, imdur,     Is the patient currently taking an anticoagulant?  If so, what is the diagnosis the patient has been given to warrant the need for the anticoagulant? plavixd    Additional Notes: requesting cardiac clearance prior to procedure

## 2020-10-12 NOTE — TELEPHONE ENCOUNTER
Re: Adelia Merlin, medical record #[id number], please provide the following wrist regarding clearance for ophthalmologic surgery: This patient has coronary artery disease and is on Plavix. He is cleared to proceed with ophthalmologic surgery with low perioperative cardiovascular risk with a negative stress test and mostly unremarkable echocardiogram several months ago. Plavix can be discontinued 5 days prior to the procedure and I recommend that it be restarted as soon as possible from a surgical perspective postoperatively.

## 2020-10-14 ENCOUNTER — HOSPITAL ENCOUNTER (OUTPATIENT)
Dept: VASCULAR LAB | Age: 80
Discharge: HOME | End: 2020-10-14
Payer: MEDICARE

## 2020-10-14 ENCOUNTER — OFFICE VISIT (OUTPATIENT)
Dept: VASCULAR SURGERY | Age: 80
End: 2020-10-14
Payer: MEDICARE

## 2020-10-14 VITALS
SYSTOLIC BLOOD PRESSURE: 170 MMHG | RESPIRATION RATE: 16 BRPM | HEART RATE: 52 BPM | BODY MASS INDEX: 23.98 KG/M2 | WEIGHT: 177 LBS | TEMPERATURE: 97.6 F | DIASTOLIC BLOOD PRESSURE: 71 MMHG | OXYGEN SATURATION: 90 % | HEIGHT: 72 IN

## 2020-10-14 PROCEDURE — 93922 UPR/L XTREMITY ART 2 LEVELS: CPT

## 2020-10-14 PROCEDURE — 93926 LOWER EXTREMITY STUDY: CPT

## 2020-10-14 PROCEDURE — 99213 OFFICE O/P EST LOW 20 MIN: CPT | Performed by: PHYSICIAN ASSISTANT

## 2020-10-14 NOTE — PROGRESS NOTES
Patient Care Team:  Shyanne Weber DO as PCP - General (Family Medicine)  Joaquin Alarcon MD as Consulting Physician (Vascular Surgery)  Ashwin Chowdhury DO as Consulting Physician (Neurosurgery)  KANCHAN Fajardo as Nurse Practitioner (Family Nurse Practitioner)  Erika Liao DO as Consulting Physician (Cardiology)      History and Physical  Mr. Ning Molina is a 78 yo male who has a history of PVD. He comes in today at the request of his wife for numbness and tingling right foot and discoloration of right toes. He reports that this started about 2 weeks ago. He is currently on Plavix and a statin daily.      Jenn Weir is a 79 y.o. male with the following history reviewed and recorded in [facility]:  Patient Active Problem List    Diagnosis Date Noted    3-vessel coronary artery disease      Priority: High     01/12/06 CABG x 6  LIMA-LAD, sequential VG-RI & OM, VG-acute maginal, PDA and PLOM [facility] [place], [id number])  4/22/2008  SE  negative for myocardial ischemia  1/9/2012  lexiscan negative for myocardial ischemia, EF 53%  04/22/2015  Symptomatic paroxymal VT (longest 17 beats at 202 bpm, on metroprolol) on  Holter  04/23/2015  Cath  Patent bypass conduits, normal LVFX, no AI, abdominal aortogram ok  04/24/2015  Single lead ICDts,  3/10/16  VT storm  5/5/2017  lexiscan Positive for inferior myocardial ischemia, EF 65%, intermediate risk findings, AUC indication 16, AUC score 7  5/15/17  Cath  Patent LIMA-LAD, patent VG-OM, patent VG-RCA, normal LVFX                   Chronic diastolic (congestive) heart failure ([address] 08/25/2020    Acute on chronic systolic heart failure ([address] 72/83/4619     Diastolic suspected, uncertain but likely acute on chronic      Ischemic cardiomyopathy 07/30/2020     Remote history of severe LV systolic dysfunction, normalized more recently, normal LV systolic function by echocardiography, July 2020      Atherosclerosis of lower extremity with claudication ([address] 06/29/2020    PVD [facility]peripheral vascular disease) [facility]     Spasm of piriformis muscle 01/28/2019    Myofascial pain 10/23/2018    Sacroiliitis ([facility] 10/23/2018    Lumbar radiculopathy 10/23/2018    Vitamin D deficiency 08/13/2018    Groin abscess     Postprocedural wound infection 05/22/2018    Atheros[facility] artery of extremity with ulceration [facility] 04/24/2018    Lumbar stenosis with neurogenic claudication 12/20/2017    Compression fracture of L4 lumbar vertebra 09/22/2017     Replacing deprecated diagnoses      S/P kyphoplasty 09/22/2017    Abnormal nuclear cardiac imaging test     SOB (shortness of breath) 04/25/2017    Fatigue 04/25/2017    Iron deficiency anemia 04/25/2017    History of amiodarone therapy 04/25/2017    Syncope     Carotid artery stenosis 04/21/2017    Chronic kidney disease (CKD), stage III (moderate)     NSVT [facility]onsustained ventricular tachycardia) [facility]     S/P CABG x 6     Sustained VT (ventricular tachycardia) ([facility])     Ventricular tachyarrhythmia ([facility])     VT (ventricular tachycardia) ([facility] 04/27/2015    Implantable cardioverter-defibrillator (ICD) in situ 04/27/2015    Hypercholesterolemia     Hypertension     COPD (chronic obstructive pulmonary disease) ([facility])     Anxiety     Depression      Current Outpatient Medications   Medication Sig Dispense Refill    acetylcysteine (MUCOMYST) 10 % nebulizer solution Procedure scheduled for 6/29/20. Mucomyst 600mg po bid the day before procedure, the day of procedure, the day after procedure.  1 mL 0    pantoprazole (PROTONIX) 40 MG tablet Take 1 tablet by mouth daily 30 tablet 5    magnesium (MAGNESIUM-OXIDE) 250 MG TABS tablet Take 250 mg by mouth 2 times daily      calcipotriene (DOVONEX) 0.005 % cream       furosemide (LASIX) 40 MG tablet Take 40 mg by mouth 2 times daily      furosemide (LASIX) 20 MG tablet Take 20 mg by mouth 2 times daily Total of 60 mg (20 mg tablet & 40 mg tablet      Cholecalciferol (VITAMIN D3) 50 MCG (2000 UT) CAPS Take 1 capsule by mouth daily      clopidogrel (PLAVIX) 75 MG tablet Take 1 tablet by mouth daily 30 tablet 5    traZODone (DESYREL) 50 MG tablet Take 50 mg by mouth daily      Tiotropium Bromide-Olodaterol (STIOLTO RESPIMAT) 2.5-2.5 MCG/ACT AERS Inhale into the lungs 2 puffs daily      Ferrous Sulfate (IRON) 325 (65 Fe) MG TABS Take by mouth daily      guaiFENesin (MUCINEX) 600 MG extended release tablet Take 600 mg by mouth 2 times daily       DULoxetine (CYMBALTA) 60 MG extended release capsule Take 60 mg by mouth daily      Calcium Carb-Cholecalciferol (CALCIUM 600+D) 600-800 MG-UNIT TABS Take by mouth      Multiple Vitamins-Minerals (THERAPEUTIC MULTIVITAMIN-MINERALS) tablet Take 1 tablet by mouth daily      docusate sodium (COLACE) 100 MG capsule Take 1 capsule by mouth 2 times daily as needed for Constipation 30 capsule 1    ondansetron (ZOFRAN) 4 MG tablet Take 1 tablet by mouth every 8 hours as needed for Nausea or Vomiting 30 tablet 1    Misc. Devices [facility]) MISC 1 each by Does not apply route daily 1 each 0    OXYGEN Inhale 4 L into the lungs as needed      atorvastatin (LIPITOR) 40 MG tablet TAKE 1 TABLET DAILY 90 tablet 3    amiodarone (CORDARONE) 200 MG tablet Take 1 tablet by mouth daily 90 tablet 3    isosorbide mononitrate (IMDUR) 60 MG extended release tablet Take 1 tablet by mouth daily 90 tablet 3    KLOR-CON M20 20 MEQ extended release tablet Take 20 mEq by mouth daily       albuterol (PROVENTIL) (2.5 MG/3ML) 0.083% nebulizer solution Take 2.5 mg by nebulization every 6 hours as needed for Wheezing       No current facility-administered medications for this visit. 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 Salima Herron CAD (coronary artery disease)     a. History of open-heart surgery X6, 01/12/06, utilizing the left VANCE to the LAD, sequential vein graft to the intermeditate and obtuse marginal, individually reverse saphenous vein graft to the acute maginal of the PDA and PLOM. b. Stress echocardiogram 04/22/08, negative for myocardial ischemia.  Carotid artery stenosis     CHF (congestive heart failure) (HCC)     Chronic kidney disease     Chronic kidney disease (CKD), stage III (moderate)     COPD (chronic obstructive pulmonary disease) (HCC)     Depression     Gastric ulcer 11/06/2004    multiple, duodenal ulcer - Dr. Anthony Goss History of blood transfusion 01/2020    Hypercholesterolemia     Hypertension     Iron deficiency     hx iron infusions    MDRO (multiple drug resistant organisms) resistance 2018    post arteriogram    Thyroid disease     Ulcer 09/08/2010    Rectum.  Ventricular tachyarrhythmia [facility])      Past Surgical History:   Procedure Laterality Date    BACK SURGERY  08/17/2017    CARDIAC CATHETERIZATION  4/22/15  JDT    EF 50%    CARDIAC DEFIBRILLATOR PLACEMENT  04/24/2015    AICD    CARDIAC SURGERY  1/06    Open Heart Surgery (x6)    CATARACT REMOVAL  05    COLON SURGERY  6/26/04    Colon reversal (10/18/2004); Colon re-reversal (10/26/2004).  EYE SURGERY  2019    EYE SURGERY  2005    HERNIA REPAIR  10/26/2006    Ileostomy reversal and hernia repair.  KNEE SURGERY  1996    LAMINEC/FACETECT/FORAMIN,CERVICAL 1 SEG N/A 12/20/2017    L3-4 decompressive laminectomy using minimally invasive technique performed by Bronwyn Sandhoff, DO at [place] [address]  11/2004    Bleeding ulcers.  OTHER SURGICAL HISTORY  02/14/2007    Debridement of abdominal/with pulse irrigation.     PA OFFICE/OUTPT VISIT,PROCEDURE ONLY N/A 8/20/2018    kyphoplasty of L5 performed by Aston Kimball DO at [facility] 11 BL VES DIRECT,LOW EXTREM Right 5/23/2018    RIGHT GROIN, DEBRIDEMENT AND MUSCLE FLAP  performed by Marcy Wilde MD at [facility] 60 EA IPSI VSL Right 4/24/2018    LOWER EXTREMITY ANGIOGRAM, BALLOON ANGIOPLASTY, ATHERECTOMY performed by Marcy Wilde MD at [address] Right 4/24/2018    COMMON FEMORAL ARTERY ENDARTERECTOMY performed by Marcy Wilde MD at [place] [address]  05/30/2005    RETINAL DETACHMENT SURGERY  06/21/2005    VASCULAR SURGERY  06/05/2017    [facility]. Left CFA 5f-6-f destination,aortogram with bilateral lower arteriogram,right SFA/Pop atherectomy CSI 1.25 solid,right SFA /pop balloon balloon angioplasty 6x150 mynx left CFA.  VASCULAR SURGERY Right 04/24/18 [facility]    Rigth common femoral/superficial femoral artery endarterectomy with right GSV patch, Left CFA 5F-6F sheath, RLE Angiogram, Right SFA/Popliteal balloon atherectomy with CSI 2.0 solid crown, Right Popliteal balloon angoplasty 5x150 lutonix, Right SFA Balloon Angioplasty 6x100 lutonix,    VASCULAR SURGERY  04/24/18 [facility]    1. Right common femoral/superficial femoral artery endarterectomy with (R) GSV Patch. 2. Left CFA 5f-6f sheath. 3. RLE A'gram. 4. (R) SFA/popliteal atherectomy with CSI 2.0 solid crown. 5.(R) popliteal balloon angioplasty 5x150 lutonix 6. (R)SFA Balloon a'plasty 6x250, 6x100 LUTONIX 7. Completion RLE A'gram 8.  (L) CFA MYNX     Family History   Problem Relation Age of Onset    Hypertension Mother     Diabetes Father     Dementia Father     Cancer Father     Colon Cancer Father     Hypertension Father     Cancer Brother     Esophageal Cancer Sister      Social History     Tobacco Use    Smoking status: Former Smoker     Packs/day: 1.50     Years: 50.00     Pack years: 75.00     Types: Cigarettes     Last attempt to quit: 4/17/2015 Years since quittin.4    Smokeless tobacco: Never Used   Substance Use Topics    Alcohol use: Not Currently       Review of Systems    Constitutional - no significant activity change, appetite change, or unexpected weight change. No fever or chills. No diaphoresis or significant fatigue. HENT - no significant rhinorrhea or epistaxis. No tinnitus or significant hearing loss. Eyes - no sudden vision change or amaurosis. Respiratory - no significant shortness of breath, wheezing, or stridor. No apnea, cough, or chest tightness associated with shortness of breath. Cardiovascular - no chest pain, syncope, or significant dizziness. No palpitations or significant leg swelling. (see HPI)  Gastrointestinal - no abdominal swelling or pain. No blood in stool. No severe constipation, diarrhea, nausea, or vomiting. Genitourinary - No difficulty urinating, dysuria, frequency, or urgency. No flank pain or hematuria. Musculoskeletal - no back pain, gait disturbance, or myalgia. Skin - no color change, rash, pallor, or new wound. Neurologic - no dizziness, facial asymmetry, or light headedness. No seizures. No speech difficulty or lateralizing weakness. Hematologic - no easy bruising or excessive bleeding. Psychiatric - no severe anxiety or nervousness. No confusion. All other review of systems are negative. Physical Exam    BP (!) 170/71 (Site: Left Upper Arm)   Pulse 52   Temp 97.6 °F (36.4 °C) (Temporal)   Resp 16   Ht 6' (1.829 m)   Wt 177 lb (80.3 kg)   SpO2 90%   BMI 24.01 kg/m²     Constitutional - well developed, well nourished. No diaphoresis or acute distress. HENT - head normocephalic. Right external ear canal appears normal.  Left external ear canal appears normal.  Septum appears midline. Eyes - conjunctiva normal.  EOMS normal.  No exudate. No icterus. Neck- ROM appears normal, no tracheal deviation. Cardiovascular - Regular rate and rhythm.   Heart sounds are normal.  No murmur, rub, or gallop. Carotid pulses are 2+ to palpation bilaterally without bruit. Extremities - Radial and ulnar pulses are 2+ to palpation bilaterally. DP and PT pulses are not palpable. Right toes are slightly bluish in discoloration. No wounds noted. No cyanosis, clubbing, or significant edema. No signs atheroembolic event. Pulmonary - effort appears normal.  No respiratory distress. Lungs - Breath sounds normal. No wheezes or rales. GI - Abdomen - soft, non tender, bowel sounds X 4 quadrants. No guarding or rebound tenderness. No distension or palpable mass. Genitourinary - deferred. Musculoskeletal - ROM appears normal.  No significant edema. Neurologic - alert and oriented X 3. Physiologic. Skin - warm, dry, and intact. No rash, erythema, or pallor. Psychiatric - mood, affect, and behavior appear normal.  Judgment and thought processes appear normal.    Risk factors for atherosclerosis of all vascular beds have been reviewed with the patient including:  Family history, tobacco abuse in all forms, elevated cholesterol, hyperlipidemia, and diabetes. Assessment      1. Atherosclerosis of artery of extremity with ulceration ([facility]    2. PVD (peripheral vascular disease) ([facility]          Plan      Recommend he continue Plavix and statin daily  We will send him for a right A'scan with [facility] today and notify him of the results      ADDENDUM:  Right A'scan with [facility] -      Impression          Right lower extremity arterial duplex exam performed. the CFA and PFA show     mild plaque with no focal stenosis. The SFA is patent without focal     stenosis, including through the stented segment.  Runoff is through all     three tibial vessels with good multiphasic waveforms in the anterior     tibial and posterior tibial arteries.          Signature          ----------------------------------------------------------------     Electronically signed by Gail Garcia MD(Interpreting     physician) on 10/15/2020 07:10 AM     ----------------------------------------------------------------         Stents      - The stent originated from the Right Prox SFA to the Right Dist SFA.         Stent velocities are as follow:Prox Stent: 114 cm/s. Mid Stent: 116 cm/s. Dist    Stent: 83 cm/s.         +-----------------------------+---+---+-----+------------------------------+    ! Location                     !PSV! EDV! Ratio!% Stenosis                    !    +-----------------------------+---+---+-----+------------------------------+    ! Prox Stent                   !114!   !     !                              !    +-----------------------------+---+---+-----+------------------------------+    ! Mid Stent                    !116!   !1.02 !                              !    +-----------------------------+---+---+-----+------------------------------+    ! Dist Stent                   !80 !   !0.72 !                              !    +-----------------------------+---+---+-----+------------------------------+    Velocities are measured in cm/s ; Diameters are measured in mm         LE Duplex Measurements         +---------------++-----+-----+----+-----------++---+-----+---+-------------+    !               ! ! Right!     !Left!           !!   !     !   !             !    +---------------++-----+-----+----+-----------++---+-----+---+-------------+    ! Location       !!PSV  !Ratio! EDV ! Wave Desc. !!PSV! Ratio! EDV! Wave Desc.   !    +---------------++-----+-----+----+-----------++---+-----+---+-------------+    ! Common Femoral !!153  !     !    !           !!   !     !   !             !    +---------------++-----+-----+----+-----------++---+-----+---+-------------+    ! Prox PFA       !!126  !     !    !           !!   !     !   !             !    +---------------++-----+-----+----+-----------++---+-----+---+-------------+    ! Prox SFA       !!67.2 !     !    !           !!   !     !   !             ! +---------------++-----+-----+----+-----------++---+-----+---+-------------+    ! Prox Popliteal !!58.6 !0.87 !    !           !!   !     !   !             !    +---------------++-----+-----+----+-----------++---+-----+---+-------------+    ! Dist Popliteal !!69.2 !1.18 !    !           !!   !     !   !             !    +---------------++-----+-----+----+-----------++---+-----+---+-------------+    ! Mid PTA        !!50.3 !0.73 !    !           !!   !     !   !             !    +---------------++-----+-----+----+-----------++---+-----+---+-------------+    ! Mid GABBY        !!64.8 !0.94 !    !           !!   !     !   !             !    +---------------++-----+-----+----+-----------++---+-----+---+-------------+    ! Mid Peroneal   !!26.9 !0.39 !    !           !!   !     !   !             !    +---------------++-----+-----+----+-----------++---+-----+---+-------------+       I spoke with Patient's wife on the phone regarding his test results. We will start him on Neurontin 100 mg TID for neuropathic type pain. He is already on Cymbalta for depression. I told his wife that if the dose needs to be titrated up, she may need to discuss this with Dr. Agustín Gonzalez. She verbalized understanding.   We will follow up in 3 months with bilateral A'scan with [facility]

## 2020-10-16 ENCOUNTER — TELEPHONE (OUTPATIENT)
Dept: VASCULAR SURGERY | Age: 80
End: 2020-10-16

## 2020-10-16 RX ORDER — GABAPENTIN 100 MG/1
CAPSULE ORAL
Qty: 90 CAPSULE | Refills: 1 | OUTPATIENT
Start: 2020-10-16 | End: 2020-12-16

## 2020-10-16 NOTE — TELEPHONE ENCOUNTER
Yury Santos requests that nurse  return their call. The best time to reach him is Anytime. Patient asking get the results of his test    Thank you.

## 2020-10-16 NOTE — TELEPHONE ENCOUNTER
Message has been given to [facility] . She will call and sw Mrs. Krishna Feliciano as soon as she's able to today.

## 2020-10-30 PROCEDURE — 93296 REM INTERROG EVL PM/IDS: CPT | Performed by: CLINICAL NURSE SPECIALIST

## 2020-10-30 PROCEDURE — 93295 DEV INTERROG REMOTE 1/2/MLT: CPT | Performed by: CLINICAL NURSE SPECIALIST

## 2020-11-09 RX ORDER — CLOPIDOGREL BISULFATE 75 MG/1
75 TABLET ORAL DAILY
Qty: 30 TABLET | Refills: 5 | Status: SHIPPED | OUTPATIENT
Start: 2020-11-09

## 2021-01-19 ENCOUNTER — HOSPITAL ENCOUNTER (OUTPATIENT)
Dept: VASCULAR LAB | Age: 81
Discharge: HOME | End: 2021-01-19
Payer: MEDICARE

## 2021-01-19 DIAGNOSIS — I73.9 PVD (PERIPHERAL VASCULAR DISEASE) (HCC): ICD-10-CM

## 2021-01-19 PROCEDURE — 93925 LOWER EXTREMITY STUDY: CPT

## 2021-01-19 PROCEDURE — 93922 UPR/L XTREMITY ART 2 LEVELS: CPT

## 2021-01-20 ENCOUNTER — VIRTUAL VISIT (OUTPATIENT)
Dept: VASCULAR SURGERY | Age: 81
End: 2021-01-20
Payer: MEDICARE

## 2021-01-20 DIAGNOSIS — I65.23 BILATERAL CAROTID ARTERY STENOSIS: ICD-10-CM

## 2021-01-20 DIAGNOSIS — I73.9 PVD (PERIPHERAL VASCULAR DISEASE) (HCC): Primary | ICD-10-CM

## 2021-01-20 PROCEDURE — 99213 OFFICE O/P EST LOW 20 MIN: CPT | Performed by: PHYSICIAN ASSISTANT

## 2021-01-20 NOTE — PROGRESS NOTES
Patient Care Team:  Rakesh Gil DO as PCP - General (Family Medicine)  Jessi Colon MD as Consulting Physician (Vascular Surgery)  Macy Montalvo DO as Consulting Physician (Neurosurgery)  KANCHAN Keller as Nurse Practitioner (Family Nurse Practitioner)  Keya Tinoco DO as Consulting Physician (Cardiology)       Marianne Dupree (:  1940) is a 79 y.o. male,Established patient, here for evaluation of the following chief complaint(s): F/u PVD      SUBJECTIVE/OBJECTIVE:  Jaime Kunz has a history of peripheral vascular disease of the lower extremities. He has had this for 1 - 5 years. Current treatment includes ASA 81 mg po qd, satin daily. Jaime Kunz has not had new wounds. He denies any claudication or IRP at this time.       Marianne Dupree is a 79 y.o. male with the following history as recorded in [facility]:  Patient Active Problem List    Diagnosis Date Noted    3-vessel coronary artery disease      Priority: High    Chronic diastolic (congestive) heart failure ([address] 2020    Acute on chronic systolic heart failure ([address] 2020    Ischemic cardiomyopathy 2020    Atherosclerosis of lower extremity with claudication ([address] 2020    PVD (peripheral vascular disease) ([address]     Spasm of piriformis muscle 2019    Myofascial pain 10/23/2018    Sacroiliitis ([address] 10/23/2018    Lumbar radiculopathy 10/23/2018    Vitamin D deficiency 2018    Groin abscess     Postprocedural wound infection 2018    Atherosclerosis of artery of extremity with ulceration ([address] 2018    Lumbar stenosis with neurogenic claudication 2017    Compression fracture of L4 lumbar vertebra 2017    S/P kyphoplasty 2017    Abnormal nuclear cardiac imaging test     SOB (shortness of breath) 2017    Fatigue 2017    Iron deficiency anemia 2017    History of amiodarone therapy 2017    Syncope  Carotid artery stenosis 04/21/2017    Chronic kidney disease (CKD), stage III (moderate)     NSVT [facility]onsustained ventricular [facility]cardia) [facility]     S/P CABG 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 04/27/2015    Implantable cardioverter-defibrillator (ICD) in situ 04/27/2015    Hypercholesterolemia     Hypertension     COPD (chronic obstructive pulmonary disease) ([facility]     Anxiety     Depression      Current Outpatient Medications   Medication Sig Dispense Refill    clopidogrel (PLAVIX) 75 MG tablet Take 1 tablet by mouth daily 30 tablet 5    gabapentin (NEURONTIN) 100 MG capsule Take one cap po TID 90 capsule 1    acetylcysteine (MUCOMYST) 10 % nebulizer solution Procedure scheduled for 6/29/20. Mucomyst 600mg po bid the day before procedure, the day of procedure, the day after procedure.  1 mL 0    pantoprazole (PROTONIX) 40 MG tablet Take 1 tablet by mouth daily 30 tablet 5    magnesium (MAGNESIUM-OXIDE) 250 MG TABS tablet Take 250 mg by mouth 2 times daily      calcipotriene (DOVONEX) 0.005 % cream       furosemide (LASIX) 40 MG tablet Take 40 mg by mouth 2 times daily      furosemide (LASIX) 20 MG tablet Take 20 mg by mouth 2 times daily Total of 60 mg (20 mg tablet & 40 mg tablet      Cholecalciferol (VITAMIN D3) 50 MCG (2000 UT) CAPS Take 1 capsule by mouth daily      traZODone (DESYREL) 50 MG tablet Take 50 mg by mouth daily      Tiotropium Bromide-Olodaterol (STIOLTO RESPIMAT) 2.5-2.5 MCG/ACT AERS Inhale into the lungs 2 puffs daily      Ferrous Sulfate (IRON) 325 (65 Fe) MG TABS Take by mouth daily      guaiFENesin (MUCINEX) 600 MG extended release tablet Take 600 mg by mouth 2 times daily       DULoxetine (CYMBALTA) 60 MG extended release capsule Take 60 mg by mouth daily      Calcium Carb-Cholecalciferol (CALCIUM 600+D) 600-800 MG-UNIT TABS Take by mouth  Multiple Vitamins-Minerals (THERAPEUTIC MULTIVITAMIN-MINERALS) tablet Take 1 tablet by mouth daily      docusate sodium (COLACE) 100 MG capsule Take 1 capsule by mouth 2 times daily as needed for Constipation 30 capsule 1    ondansetron (ZOFRAN) 4 MG tablet Take 1 tablet by mouth every 8 hours as needed for Nausea or Vomiting 30 tablet 1    Misc. Devices [facility]) MISC 1 each by Does not apply route daily 1 each 0    OXYGEN Inhale 4 L into the lungs as needed      atorvastatin (LIPITOR) 40 MG tablet TAKE 1 TABLET DAILY 90 tablet 3    amiodarone (CORDARONE) 200 MG tablet Take 1 tablet by mouth daily 90 tablet 3    isosorbide mononitrate (IMDUR) 60 MG extended release tablet Take 1 tablet by mouth daily 90 tablet 3    KLOR-CON M20 20 MEQ extended release tablet Take 20 mEq by mouth daily       albuterol (PROVENTIL) (2.5 MG/3ML) 0.083% nebulizer solution Take 2.5 mg by nebulization every 6 hours as needed for Wheezing       No current facility-administered medications for this visit. 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 Felicia Pierce CAD (coronary artery disease)     a. History of open-heart surgery X6, 01/12/06, utilizing the left VANCE to the LAD, sequential vein graft to the intermeditate and obtuse marginal, individually reverse saphenous vein graft to the acute maginal of the PDA and PLOM. b. Stress echocardiogram 04/22/08, negative for myocardial ischemia.     Carotid artery stenosis     CHF (congestive heart failure) (HCC)     Chronic kidney disease  Chronic kidney disease (CKD), stage III (moderate)     COPD [facility] obstructive [facility] disease) [facility]     Depression     Gastric ulcer 11/06/2004    multiple, duodenal ulcer - Dr. Amanda Poe History of blood transfusion 01/2020    Hypercholesterolemia     Hypertension     Iron deficiency     hx iron infusions    MDRO (multiple drug resistant organisms) resistance 2018    post arteriogram    Thyroid disease     Ulcer 09/08/2010    Rectum.  Ventricular tachyarrhythmia [facility])      Past Surgical History:   Procedure Laterality Date    BACK SURGERY  08/17/2017    CARDIAC CATHETERIZATION  4/22/15  JDT    EF 50%    CARDIAC DEFIBRILLATOR PLACEMENT  04/24/2015    AICD    CARDIAC SURGERY  1/06    Open Heart Surgery (x6)    CATARACT REMOVAL  05    COLON SURGERY  6/26/04    Colon reversal (10/18/2004); Colon re-reversal (10/26/2004).  EYE SURGERY  2019    EYE SURGERY  2005    HERNIA REPAIR  10/26/2006    Ileostomy reversal and hernia repair.  KNEE SURGERY  1996    LAMINEC/FACETECT/FORAMIN,CERVICAL 1 SEG N/A 12/20/2017    L3-4 decompressive laminectomy using minimally invasive technique performed by Savage Galaviz DO at [address]  11/2004    Bleeding ulcers.  OTHER SURGICAL HISTORY  02/14/2007    Debridement of abdominal/with pulse irrigation.     LA OFFICE/OUTPT VISIT,PROCEDURE ONLY N/A 8/20/2018    kyphoplasty of L5 performed by Savage Galaviz DO at [facility] 11 BL VES DIRECT,LOW EXTREM Right 5/23/2018    RIGHT GROIN, DEBRIDEMENT AND MUSCLE FLAP  performed by Essence Frazier MD at [facility] 60 EA IPSI VSL Right 4/24/2018    LOWER EXTREMITY ANGIOGRAM, BALLOON ANGIOPLASTY, ATHERECTOMY performed by Essence Frazier MD at [address] Right 4/24/2018    COMMON FEMORAL ARTERY ENDARTERECTOMY performed by Essence Frazier MD at [facility]  05/30/2005  RETINAL DETACHMENT SURGERY  06/21/2005    VASCULAR SURGERY  06/05/2017    SJS. Left CFA 5f-6-f destination,aortogram with bilateral lower arteriogram,right SFA/Pop atherectomy CSI 1.25 solid,right SFA /pop balloon balloon angioplasty 6x150 mynx left CFA.  VASCULAR SURGERY Right 04/24/18 SJS    Rigth common femoral/superficial femoral artery endarterectomy with right GSV patch, Left CFA 5F-6F sheath, RLE Angiogram, Right SFA/Popliteal balloon atherectomy with CSI 2.0 solid crown, Right Popliteal balloon angoplasty 5x150 lutonix, Right SFA Balloon Angioplasty 6x100 lutonix,    VASCULAR SURGERY  04/24/18 SJS    1. Right common femoral/superficial femoral artery endarterectomy with (R) GSV Patch. 2. Left CFA 5f-6f sheath. 3. RLE A'gram. 4. (R) SFA/popliteal atherectomy with CSI 2.0 solid crown. 5.(R) popliteal balloon angioplasty 5x150 lutonix 6. (R)SFA Balloon a'plasty 6x250, 6x100 LUTONIX 7. Completion RLE A'gram 8.  (L) CFA MYNX    VASCULAR SURGERY  06/29/2020    SJS-  Percutaneous cannulation right common femoral artery with 5 Western Aura and later 6 [place] Aura [place] destination sheath suprarenal add aortogram with Bilateral iliofemoral ateriogram and BLE arteriogram balloon angioplasty L superficial femoral and above knee popliteal artery with 3.5 mm x 120 mm coyote balloon, 5 mm x 150 mm jami balloon, 6 mm x 150 mm  balloon, 6 mm x 40 mm conquest     VASCULAR SURGERY  06/29/2020    SJS- CONT: balloon left superficial femoral/popliteal artery stents ( supera 5.5 mm x 150 mm, 5.5 mm x 120 mm, Elisabeth 6 mm x 120 mm self-expanding drug-eluting stent) Balloon angioplasty L superficial femoral-popliteal artery stents with 6 mm x 150 mm  balloon completion LLE arteriograms minx closure R CFA puncture site      Family History   Problem Relation Age of Onset    Hypertension Mother     Diabetes Father     Dementia Father     Cancer Father     Colon Cancer Father     Hypertension Father  Cancer Brother     Esophageal Cancer Sister      Social History     Tobacco Use    Smoking status: Former Smoker     Packs/day: 1.50     Years: 50.00     Pack years: 75.00     Types: Cigarettes     Quit date: 2015     Years since quittin.7    Smokeless tobacco: Never Used   Substance Use Topics    Alcohol use: Not Currently       ROS  Eyes  no sudden vision change or amaurosis. Respiratory  no significant shortness of breath,  Cardiovascular  no chest pain or syncope. No  significant leg swelling. No claudication. Musculoskeletal  no gait disturbance  Skin  no new wound. Neurologic   No speech difficulty or lateralizing weakness. All other review of systems are negative. Physical Exam    Neck- no masses noted  Cardiovascular  Regular rate and rhythm. Pulmonary  effort appears normal.  No respiratory distress. Lungs -No audible wheezes noted. Extremities - No cyanosis, clubbing, or significant edema. No signs atheroembolic event. Neurologic  alert and oriented X 3. Physiologic. Face symmetric. Skin  warm, dry, and intact. Psychiatric  mood, affect, and behavior appear normal.  Judgment and thought processes appear normal.    Risk factors for atherosclerosis of all vascular beds have been reviewed with the patient including:  Family history, tobacco abuse in all forms, elevated cholesterol, hyperlipidemia, and diabetes. Bilateral lower extremity arterial scan:  Impression        Bilateral lower extremity arterial duplex exam performed.    The right leg shows diffuse atherosclerosis, however no focal stenosis are    seen. the SFA stent is widely patent.    The left leg shows diffuse atherosclerosis, however no focal stenosis are    seen.  the SFA stent is widely patent.        Signature        ----------------------------------------------------------------    Electronically signed by Tiffanie Davis MD(Interpreting    [email or web address]) on 2021 03:04 PM LE Duplex Measurements       +---------------++-----+-----+----+-----------++----+-----+---+------------+   !               ! ! Right!     !Left!           !!    !     !   !            !   +---------------++-----+-----+----+-----------++----+-----+---+------------+   ! Location       !!PSV  !Ratio! EDV ! Wave Desc. !!PSV ! Ratio! EDV! Wave Desc.  !   +---------------++-----+-----+----+-----------++----+-----+---+------------+   ! Common Femoral !!168  !     !    !           !!121 !     !   !            !   +---------------++-----+-----+----+-----------++----+-----+---+------------+   ! Prox PFA       !!96.2 !     !    !           !!72.3!     !   !            !   +---------------++-----+-----+----+-----------++----+-----+---+------------+   ! Prox SFA       !!68   !     !    !           !!210 !     !   !            !   +---------------++-----+-----+----+-----------++----+-----+---+------------+   ! Prox Popliteal !!80.3 !1.18 !    !           !!131 !0.62 !   !            !   +---------------++-----+-----+----+-----------++----+-----+---+------------+   ! Dist Popliteal !!90.4 !1.13 !    !           !!129 !0.98 !   !            !   +---------------++-----+-----+----+-----------++----+-----+---+------------+   ! Mid PTA        !!50.4 !0.56 !    !           !!66.3!0.51 !   !            !   +---------------++-----+-----+----+-----------++----+-----+---+------------+   ! Mid GABBY        !!98.5 !1.09 !    !           !!81.5!0.63 !   !            !   +---------------++-----+-----+----+-----------++----+-----+---+------------+   ! Mid Peroneal   !!45.1 !0.5  !    !           !!110 !0.85 !   !            !   +---------------++-----+-----+----+-----------++----+-----+---+------------+     Impression        Based on ankle-brachial indices and doppler waveforms, the patient has    relatively normal flow to the bilateral lower extremity arterial system at   [facility].        Signature      ----------------------------------------------------------------    Electronically signed by Elenita DOMINGUEZInterpreting    physician) on 01/19/2021 03:01 PM    ----------------------------------------------------------------       Velocities are measured in cm/s ; Diameters are measured in mm       Pressures   +--------------------------------------++--------+-----+----+--------+-----+   !                                      ! ! Right   !     !Left!        !     !   +--------------------------------------++--------+-----+----+--------+-----+   ! Location                              ! ! Pressure! Ratio!    !Pressure! Ratio! +--------------------------------------++--------+-----+----+--------+-----+   ! Ankle PT                              !!209     !0.93 !    !092     !1.04 !   +--------------------------------------++--------+-----+----+--------+-----+   ! DP                                    !!183     !0.81 !    !220     !0.98 !   +--------------------------------------++--------+-----+----+--------+-----+   ! Great Toe                             !!80      !0.43 !    !151     !0.67 !   +--------------------------------------++--------+-----+----+--------+-----+         - Brachial Pressure:Right: 220. Left:225.         - RENAY:Right: 0.93. Left: 1.04.       Plethysmographic Digit Evaluation   +---------++--------+-----+---------------++--------+-----+----------------+   !         ! ! Right   !     ! Left           !!        !     !                !   +---------++--------+-----+---------------++--------+-----+----------------+   ! Location ! !Pressure! Ratio! PPG Wave Form  !!Pressure! Ratio! PPG Wave Form   !   +---------++--------+-----+---------------++--------+-----+----------------+   ! Great Toe!!97      !0.43 !               !!151     !0.67 !                !   +---------++--------+-----+---------------++--------+-----+----------------+     Individual films reviewed: Yes. These results were reviewed with the patient. Disease process is stable    Reviewed on this visit: prior studies including JASON    Reviewed previous studies including: JASON  Individual images were reviewed. I agree with the findings Results were discussed with the patient. ASSESSMENT/PLAN:      1. PVD        Recommend he continue ASA and a statin   Recommend no smoking  Recommend good BP control  Recommend low fat, low cholesterol diet  Recommend daily exercise in moderation  Follow up in 6 months with a repeat bilateral  A'scan with RENAY      Learning About Peripheral Arterial Disease of the Legs  What is peripheral arterial disease? Peripheral arterial disease (PAD) is narrowing or blockage of arteries in your arms and legs. The most common cause of PAD is the buildup of plaque on the inside of arteries. Plaque is made of extra cholesterol, calcium, and other material in your blood. Over time, plaque builds up in the walls of the arteries, including those that supply blood to your legs. This buildup leads to poor blood flow. When you have PAD, you have a risk of having plaque in other arteries in your body. This raises your risk of a heart attack and stroke. This information focuses on peripheral arterial disease of the legs, the area where it is most common. When you have PAD of the legs and you walk or exercise, your leg muscles may not get enough blood. This may cause symptoms, such as leg pain during exercise. Peripheral arterial disease is also called peripheral vascular disease. What are the symptoms? Many people who have PAD do not have any symptoms. But if you have symptoms, you may have weak or tired legs, difficulty walking or balancing, or pain. If you have pain, you might feel a tight, aching, or squeezing pain in the calf, thigh, or buttock. This pain usually happens after you have walked a certain distance. The pain goes away if you stop walking. This pain is called intermittent claudication. If PAD gets worse, you may have other symptoms that are caused by poor blood flow to your legs and feet. You may have:  · Cold or numb feet or toes. · Sores that are slow to heal.  · Leg or foot pain while you are at rest.  · Feet and toes that become pale from exercise or when elevated. · Feet that turn red when dangled. · Blue or purple marks on your legs, feet, or toes. Please call the office right away if you experience any of the above  How can you prevent PAD? · Quit smoking. Quitting smoking is one of the best things you can do to help prevent PAD. If you need help quitting, talk to your doctor about stop-smoking programs and medicines. These can increase your chances of quitting for good. · Stay at a healthy weight. · Manage other health problems, including diabetes, high blood pressure, and high cholesterol. · Be physically active. Get at least 30 minutes of exercise on most days of the week. Walking is a good choice. You also may want to do other activities, such as running, swimming, cycling, or playing tennis or team sports. · Eat a variety of heart-healthy foods. ? Eat fruits, vegetables, whole grains (like oatmeal), dried beans and peas, nuts and seeds, soy products (like tofu), and fat-free or low-fat dairy products. ? Replace butter, margarine, and hydrogenated or partially hydrogenated oils with olive and canola oils. (Canola oil margarine without trans fat is fine.)  ? Replace red meat with fish, poultry, and soy protein (like tofu). ? Limit processed and packaged foods like chips, crackers, and cookies. How is PAD treated? Your doctor may suggest ways to relieve symptoms and lower your risk of heart attack and stroke. These may include:  · Quitting smoking. It's one of the most important things you can do. If you need help quitting, talk to your doctor about stop-smoking programs and medicines. These can increase your chances of quitting for good. · Eating heart-healthy foods. · Staying at a healthy weight. Lose weight if you need to. · Regular exercise (if your doctor says it's safe). Try walking, swimming, or biking for at least 30 minutes on most, if not all, days of the week. If you have leg symptoms when you exercise, your doctor might recommend a specialized exercise program that may relieve symptoms. The goal is to be able to walk farther without pain. · Medicines that help manage other problems such as high blood pressure and high cholesterol. · Medicine, such as aspirin, that prevents blood clots which could cause a heart attack or stroke. · Procedures, such as opening narrowed or blocked arteries (angioplasty) or using healthy blood vessels to create detours around narrowed or blocked arteries (bypass surgery). Follow-up care is a key part of your treatment and safety. Be sure to make and go to all appointments, and call your doctor if you are having problems. It's also a good idea to know your test results and keep a list of the medicines you take. An electronic signature was used to authenticate this note.     --Lakeshia Brenner PA-C

## 2021-05-13 RX ORDER — CLOPIDOGREL BISULFATE 75 MG/1
75 TABLET ORAL DAILY
Qty: 30 TABLET | Refills: 3 | Status: SHIPPED | OUTPATIENT
Start: 2021-05-13

## 2021-09-13 ENCOUNTER — HOSPITAL ENCOUNTER (OUTPATIENT)
Dept: VASCULAR LAB | Age: 81
Discharge: HOME | End: 2021-09-13
Payer: MEDICARE

## 2021-09-13 ENCOUNTER — OFFICE VISIT (OUTPATIENT)
Dept: VASCULAR SURGERY | Age: 81
End: 2021-09-13
Payer: MEDICARE

## 2021-09-13 VITALS
HEART RATE: 71 BPM | SYSTOLIC BLOOD PRESSURE: 160 MMHG | RESPIRATION RATE: 18 BRPM | HEIGHT: 72 IN | DIASTOLIC BLOOD PRESSURE: 83 MMHG | WEIGHT: 177 LBS | BODY MASS INDEX: 23.98 KG/M2 | TEMPERATURE: 95.7 F

## 2021-09-13 DIAGNOSIS — I73.9 PVD (PERIPHERAL VASCULAR DISEASE) (HCC): ICD-10-CM

## 2021-09-13 DIAGNOSIS — I65.23 BILATERAL CAROTID ARTERY STENOSIS: ICD-10-CM

## 2021-09-13 DIAGNOSIS — I73.9 PVD (PERIPHERAL VASCULAR DISEASE) (HCC): Primary | ICD-10-CM

## 2021-09-13 PROCEDURE — 93922 UPR/L XTREMITY ART 2 LEVELS: CPT

## 2021-09-13 PROCEDURE — 93880 EXTRACRANIAL BILAT STUDY: CPT

## 2021-09-13 PROCEDURE — 99213 OFFICE O/P EST LOW 20 MIN: CPT | Performed by: PHYSICIAN ASSISTANT

## 2021-09-13 PROCEDURE — 93925 LOWER EXTREMITY STUDY: CPT

## 2021-09-13 NOTE — PROGRESS NOTES
Patient Care Team:  Ike Aly DO as PCP - General (Family Medicine)  Alisa Zavala MD as Consulting Physician (Vascular Surgery)  Roman Villalta DO as Consulting Physician (Neurosurgery)  KANCHAN Gaston as Nurse Practitioner (Family Nurse Practitioner)  Ronna Philippe DO as Consulting Physician (Cardiology)      History and Physical  Mr. Sonam Lopez is an 52-year-old male who has a history of peripheral vascular disease,CAD, COPD and carotid artery stenosis. Today we are following up for these. He reports that he is not walking much due to generalized weakness and shortness of breath. He reports when he does walk he has some discomfort in his right leg he reports he does have some issues with his right hip. He has a history of macular degeneration of the right eye. He reports that his vision is decreased in his right eye but has not changed eyes any speech difficulties or lateralizing weakness numbness or tingling of his left side. He is on Plavix and a statin daily. He reports that he does not take aspirin because he has had issues with GI bleeding in the past when on both Plavix and aspirin. He sees Dr. Carmita Rudolph and Dr. Juan José Reid.      Inder Sarabia is a [de-identified] y.o. male with the following history reviewed and recorded in [facility]:  Patient Active Problem List    Diagnosis Date Noted    3-vessel coronary artery disease      Priority: High     01/12/06 CABG x 6  LIMA-LAD, sequential VG-RI & OM, VG-acute maginal, PDA and PLOM [place], [address] [address] [facility] )  4/22/2008  SE  negative for myocardial ischemia  1/9/2012  lexiscan negative for myocardial ischemia, EF 53%  04/22/2015  Symptomatic paroxymal VT (longest 17 beats at 202 bpm, on metroprolol) on  Holter  04/23/2015  Cath  Patent bypass conduits, normal LVFX, no AI, abdominal aortogram ok  04/24/2015  Single lead ICDts,  3/10/16  VT storm  5/5/2017  lexiscan Positive for inferior myocardial ischemia, EF 65%, intermediate risk findings, AUC indication 16, AUC score 7  5/15/17  Cath  Patent LIMA-LAD, patent VG-OM, patent VG-RCA, normal LVFX                   Chronic diastolic (congestive) heart failure ([address] 08/25/2020    Acute on chronic systolic heart failure ([address] 60/04/6275     Diastolic suspected, uncertain but likely acute on chronic      Ischemic cardiomyopathy 07/30/2020     Remote history of severe LV systolic dysfunction, normalized more recently, normal LV systolic function by echocardiography, July 2020      Atherosclerosis of lower extremity with claudication ([address] 06/29/2020    PVD (peripheral vascular disease) ([address]     Spasm of piriformis muscle 01/28/2019    Myofascial pain 10/23/2018    Sacroiliitis ([address] 10/23/2018    Lumbar radiculopathy 10/23/2018    Vitamin D deficiency 08/13/2018    Groin abscess     Postprocedural wound infection 05/22/2018    Atherosclerosis of artery of extremity with ulceration ([address] 04/24/2018    Lumbar stenosis with neurogenic claudication 12/20/2017    Compression fracture of L4 lumbar vertebra 09/22/2017     Replacing deprecated diagnoses      S/P kyphoplasty 09/22/2017    Abnormal nuclear cardiac imaging test     SOB (shortness of breath) 04/25/2017    Fatigue 04/25/2017    Iron deficiency anemia 04/25/2017    History of amiodarone therapy 04/25/2017    Syncope     Carotid artery stenosis 04/21/2017    Chronic kidney disease (CKD), stage III (moderate) ([facility]     NSVT (nonsustained ventricular tachycardia) ([facility])     S/P CABG x 6     Sustained VT (ventricular tachycardia) ([facility])     Ventricular tachyarrhythmia ([facility])     VT (ventricular tachycardia) ([address] 04/27/2015    Implantable cardioverter-defibrillator (ICD) in situ 04/27/2015    Hypercholesterolemia     Hypertension     COPD (chronic obstructive pulmonary disease) ([facility])     Anxiety     Depression      Current Outpatient Medications   Medication Sig Dispense Refill    clopidogrel (PLAVIX) 75 MG tablet Take 1 tablet by mouth daily 30 tablet 3    clopidogrel (PLAVIX) 75 MG tablet Take 1 tablet by mouth daily 30 tablet 5    gabapentin (NEURONTIN) 100 MG capsule Take one cap po TID 90 capsule 1    acetylcysteine (MUCOMYST) 10 % nebulizer solution Procedure scheduled for 6/29/20. Mucomyst 600mg po bid the day before procedure, the day of procedure, the day after procedure. 1 mL 0    pantoprazole (PROTONIX) 40 MG tablet Take 1 tablet by mouth daily 30 tablet 5    magnesium (MAGNESIUM-OXIDE) 250 MG TABS tablet Take 250 mg by mouth 2 times daily      calcipotriene (DOVONEX) 0.005 % cream       furosemide (LASIX) 40 MG tablet Take 40 mg by mouth 2 times daily      furosemide (LASIX) 20 MG tablet Take 20 mg by mouth 2 times daily Total of 60 mg (20 mg tablet & 40 mg tablet      Cholecalciferol (VITAMIN D3) 50 MCG (2000 UT) CAPS Take 1 capsule by mouth daily      traZODone (DESYREL) 50 MG tablet Take 50 mg by mouth daily      Tiotropium Bromide-Olodaterol (STIOLTO RESPIMAT) 2.5-2.5 MCG/ACT AERS Inhale into the lungs 2 puffs daily      Ferrous Sulfate (IRON) 325 (65 Fe) MG TABS Take by mouth daily      guaiFENesin (MUCINEX) 600 MG extended release tablet Take 600 mg by mouth 2 times daily       DULoxetine (CYMBALTA) 60 MG extended release capsule Take 60 mg by mouth daily      Calcium Carb-Cholecalciferol (CALCIUM 600+D) 600-800 MG-UNIT TABS Take by mouth      Multiple Vitamins-Minerals (THERAPEUTIC MULTIVITAMIN-MINERALS) tablet Take 1 tablet by mouth daily      docusate sodium (COLACE) 100 MG capsule Take 1 capsule by mouth 2 times daily as needed for Constipation 30 capsule 1    ondansetron (ZOFRAN) 4 MG tablet Take 1 tablet by mouth every 8 hours as needed for Nausea or Vomiting 30 tablet 1    Misc.  Devices [facility]) MISC 1 each by Does not apply route daily 1 each 0    OXYGEN Inhale 4 L into the lungs as needed      atorvastatin (LIPITOR) 40 MG tablet TAKE 1 TABLET DAILY 90 tablet 3    amiodarone (CORDARONE) 200 MG tablet Take 1 tablet by mouth daily 90 tablet 3    isosorbide mononitrate (IMDUR) 60 MG extended release tablet Take 1 tablet by mouth daily 90 tablet 3    KLOR-CON M20 20 MEQ extended release tablet Take 20 mEq by mouth daily       albuterol (PROVENTIL) (2.5 MG/3ML) 0.083% nebulizer solution Take 2.5 mg by nebulization every 6 hours as needed for Wheezing       No current facility-administered medications for this visit. 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 Anthony Christianson CAD (coronary artery disease)     a. History of open-heart surgery X6, 01/12/06, utilizing the left VANCE to the LAD, sequential vein graft to the intermeditate and obtuse marginal, individually reverse saphenous vein graft to the acute maginal of the PDA and PLOM. b. Stress echocardiogram 04/22/08, negative for myocardial ischemia.  Carotid artery stenosis     CH[facility]congestive heart failure) ([facility]     Chronic kidney disease     Chronic kidney disease (CKD), stage III (moderate) ([facility])     [facility]     Depression     Gastric ulcer 11/06/2004    multiple, duodenal ulcer - Dr. Teresita Monroy History of blood transfusion 01/2020    Hypercholesterolemia     Hypertension     Iron deficiency     hx iron infusions    MDRO (multiple drug resistant organisms) resistance 2018    post arteriogram    Thyroid disease     Ulcer 09/08/2010    Rectum.     Ventricular tachyarrhythmia [facility])      Past Surgical History:   Procedure Laterality Date    BACK SURGERY  08/17/2017    CARDIAC CATHETERIZATION  4/22/15  JDT    EF 50%    CARDIAC DEFIBRILLATOR PLACEMENT  04/24/2015    Psychiatric    CARDIAC SURGERY  1/06    Open Heart Surgery (x6)    CATARACT REMOVAL  05    COLON SURGERY  6/26/04    Colon reversal (10/18/2004); Colon re-reversal (10/26/2004).  EYE SURGERY  2019    EYE SURGERY  2005    HERNIA REPAIR  10/26/2006    Ileostomy reversal and hernia repair.  KNEE SURGERY  1996    LAMINEC/FACETECT/FORAMIN,CERVICAL 1 SEG N/A 12/20/2017    L3-4 decompressive laminectomy using minimally invasive technique performed by Charley Roche DO at [address]  11/2004    Bleeding ulcers.  OTHER SURGICAL HISTORY  02/14/2007    Debridement of abdominal/with pulse irrigation.  AR OFFICE/OUTPT VISIT,PROCEDURE ONLY N/A 8/20/2018    kyphoplasty of L5 performed by Charley Roche DO at [facility] 11 BL VES DIRECT,LOW EXTREM Right 5/23/2018    RIGHT GROIN, DEBRIDEMENT AND MUSCLE FLAP  performed by Corey Crews MD at [facility] 60 EA IPSI VSL Right 4/24/2018    LOWER EXTREMITY ANGIOGRAM, BALLOON ANGIOPLASTY, ATHERECTOMY performed by Corey Crews MD at [facility] [address] Right 4/24/2018    COMMON FEMORAL ARTERY ENDARTERECTOMY performed by Corey Crews MD at [place] [address]  05/30/2005    RETINAL DETACHMENT SURGERY  06/21/2005    VASCULAR SURGERY  06/05/2017    [facility]. Left CFA 5f-6-f destination,aortogram with bilateral lower arteriogram,right SFA/Pop atherectomy CSI 1.25 solid,right SFA /pop balloon balloon angioplasty 6x150 mynx left CFA.  VASCULAR SURGERY Right 04/24/18 [facility]    Rigth common femoral/superficial femoral artery endarterectomy with right GSV patch, Left CFA 5F-6F sheath, RLE Angiogram, Right SFA/Popliteal balloon atherectomy with CSI 2.0 solid crown, Right Popliteal balloon angoplasty 5x150 lutonix, Right SFA Balloon Angioplasty 6x100 lutonix,    VASCULAR SURGERY  04/24/18 [facility]    1. Right common femoral/superficial femoral artery endarterectomy with (R) GSV Patch. 2. Left CFA 5f-6f sheath. 3. RLE A'gram. 4. (R) SFA/popliteal atherectomy with CSI 2.0 solid crown. 5.(R) popliteal balloon angioplasty 5x150 lutonix 6. (R)SFA Balloon a'plasty 6x250, 6x100 LUTONIX 7. Completion RLE A'gram 8. (L) CFA MYNX    VASCULAR SURGERY  2020    [facility]-  Percutaneous cannulation right common femoral artery with 5 Western Aura and later 6 Western Aura [place] destination sheath suprarenal add aortogram with Bilateral iliofemoral ateriogram and BLE arteriogram balloon angioplasty L superficial femoral and above knee popliteal artery with 3.5 mm x 120 mm coyote balloon, 5 mm x 150 mm jami balloon, 6 mm x 150 mm  balloon, 6 mm x 40 mm conquest     VASCULAR SURGERY  2020    [facility]- CONT: balloon left superficial femoral/popliteal artery stents ( supera 5.5 mm x 150 mm, 5.5 mm x 120 mm, Elisabeth 6 mm x 120 mm self-expanding drug-eluting stent) Balloon angioplasty L superficial femoral-popliteal artery stents with 6 mm x 150 mm  balloon completion LLE arteriograms minx closure R CFA puncture site      Family History   Problem Relation Age of Onset    Hypertension Mother     Diabetes Father     Dementia Father     Cancer Father     Colon Cancer Father     Hypertension Father     Cancer Brother     Esophageal Cancer Sister      Social History     Tobacco Use    Smoking status: Former Smoker     Packs/day: 1.50     Years: 50.00     Pack years: 75.00     Types: Cigarettes     Quit date: 2015     Years since quittin.4    Smokeless tobacco: Never Used   Substance Use Topics    Alcohol use: Not Currently       Review of Systems    Constitutional - no significant activity change, appetite change, or unexpected weight change. No fever or chills. No diaphoresisGemeralized weakness  HENT - no significant rhinorrhea or epistaxis. No tinnitus or significant hearing loss.   Eyes - no sudden vision change or amaurosis. Respiratory - significant shortness of breath on exertion. No  wheezing, or stridor. No apnea, cough, or chest tightness associated with shortness of breath. Cardiovascular - no chest pain, syncope, or significant dizziness. No palpitations. He reports some right leg swelling. (see HPI)  Gastrointestinal - no abdominal swelling or pain. No blood in stool. No severe constipation, diarrhea, nausea, or vomiting. Genitourinary - No difficulty urinating, dysuria, frequency, or urgency. No flank pain or hematuria. Musculoskeletal - no back pain, gait disturbance, or myalgia. Skin - no color change, rash, pallor, or new wound. Neurologic - no dizziness, facial asymmetry, or light headedness. No seizures. No speech difficulty or lateralizing weakness. Hematologic - no easy bruising or excessive bleeding. Psychiatric - no severe anxiety or nervousness. No confusion. All other review of systems are negative. Physical Exam    BP (!) 160/83 (Site: Right Upper Arm)   Pulse 71   Temp 95.7 °F (35.4 °C)   Resp 18   Ht 6' (1.829 m)   Wt 177 lb (80.3 kg)   BMI 24.01 kg/m²     Constitutional - well developed, well nourished. No diaphoresis or acute distress. HENT - head normocephalic. Right external ear canal appears normal.  Left external ear canal appears normal.  Septum appears midline. Eyes - conjunctiva normal.  EOMS normal.  No exudate. No icterus. Neck- ROM appears normal, no tracheal deviation. Cardiovascular - Regular rate and rhythm. Heart sounds are normal.  No murmur, rub, or gallop. Carotid pulses are 2+ to palpation bilaterally without bruit. Extremities - Radial and ulnar pulses are 2+ to palpation bilaterally. Bilateral DP and left PT pulses are palpable. No cyanosis, clubbing. He has right leg edema. No signs atheroembolic event. Pulmonary - effort appears normal.  No respiratory distress. Lungs - Breath sounds normal. No wheezes or rales.     GI - Abdomen - soft, non tender, bowel sounds X 4 quadrants. No guarding or rebound tenderness. No distension or palpable mass. Genitourinary - deferred. Musculoskeletal - ROM appears normal.  No significant edema. Neurologic - alert and oriented X 3. Physiologic. Skin - warm, dry, and intact. No rash, erythema, or pallor. Psychiatric - mood, affect, and behavior appear normal.  Judgment and thought processes appear normal.    Risk factors for atherosclerosis of all vascular beds have been reviewed with the patient including:  Family history, tobacco abuse in all forms, elevated cholesterol, hyperlipidemia, and diabetes. Bilateral lower extremity arterial scan:   Impression      There is no significant stenosis in the bilateral superficial femoral   artery stents. Signature      ----------------------------------------------------------------   Electronically signed by Patricia Black MD(Interpreting   physician) on 09/13/2021 09:15 PM   ----------------------------------------------------------------     Stents    - The stent originated from the Right Prox SFA to the Right Dist SFA.     Stent velocities are as follow:Prox Stent: 162 cm/s. Mid Stent: 132 cm/s. Dist  Stent: 120 cm/s.     +-----------------------------+---+---+-----+------------------------------+  ! Location                     ! PSV! EDV! Ratio!% Stenosis                    ! +-----------------------------+---+---+-----+------------------------------+  ! Prox Stent                   !162!21 !     !                              !  +-----------------------------+---+---+-----+------------------------------+  ! Mid Stent                    !132!21 !0.81 !                              !  +-----------------------------+---+---+-----+------------------------------+  ! Dist Stent                   !120!26 !0.91 !                              !  +-----------------------------+---+---+-----+------------------------------+    - The stent originated from the Left Prox SFA to the Left Dist SFA.     Stent velocities are as follow:Prox Stent: 178 cm/s. Mid Stent: 157 cm/s. Dist  Stent: 173 cm/s.     +-----------------------------+---+---+-----+------------------------------+  ! Location                     ! PSV! EDV! Ratio!% Stenosis                    ! +-----------------------------+---+---+-----+------------------------------+  ! Prox Stent                   !178!   !     !                              !  +-----------------------------+---+---+-----+------------------------------+  ! Mid Stent                    ! 157!   !0.88 !                              !  +-----------------------------+---+---+-----+------------------------------+  ! Dist Stent                   ! 173!   !1.1  !                              !  +-----------------------------+---+---+-----+------------------------------+  Velocities are measured in cm/s ; Diameters are measured in mm     LE Duplex Measurements     +---------------++-----+-----+----+-----------++----+-----+---+------------+  ! ! !Right! ! Left!           !!    !     !   !            !  +---------------++-----+-----+----+-----------++----+-----+---+------------+  ! Location       ! !PSV  ! Ratio! EDV ! Wave Desc. !!PSV ! Ratio! EDV! Wave Desc.  !  +---------------++-----+-----+----+-----------++----+-----+---+------------+  ! Common Femoral !!162  !     !11.8!           !!169 !     !   !            !  +---------------++-----+-----+----+-----------++----+-----+---+------------+  ! Prox PFA       !!140  !     !    !           !!151 !     !   !            !  +---------------++-----+-----+----+-----------++----+-----+---+------------+  ! Prox SFA       !!62.1 !     !11.1!           !!196 !     !   !            !  +---------------++-----+-----+----+-----------++----+-----+---+------------+  ! Prox Popliteal !!87.4 !1.41 !    !           !!126 !0.64 !   !            !  +---------------++-----+-----+----+-----------++----+-----+---+------------+  ! Dist Popliteal !!118  !1.35 !    !           !!98.2!0.78 !   !            !  +---------------++-----+-----+----+-----------++----+-----+---+------------+  ! Prox PTA       !!40   !0.34 !10.2! !!83.3!0.85 !   !            !  +---------------++-----+-----+----+-----------++----+-----+---+------------+  ! Dist PTA       !!44   !1.1  !11.7!           !!23.8!0.29 !   !            !  +---------------++-----+-----+----+-----------++----+-----+---+------------+  ! Prox GABBY       !!193  !1.64 !20.6!           !!62.5!0.64 !   !            !  +---------------++-----+-----+----+-----------++----+-----+---+------------+  ! Dist GABBY       !!188  !0.97 !26.5!           !!56.2!0.9  !   !            !  +---------------++-----+-----+----+-----------++----+-----+---+------------+  ! Prox Peroneal  !!     !     !    !           !!99.2!1.19 !   !            !  +---------------++-----+-----+----+-----------++----+-----+---+------------+  ! Dist Peroneal  !!     !     !    !           !!79.2!0.8  !   !            !  +---------------++-----+-----+----+-----------++----+-----+---+------------+    Right RENAY 0.90, Left RENAY 1.04  Individual films reviewed: Yes. Test results were reviewed with the patient. Doppler results:    Right CCA/ICA 70-99% stenotic  Left CCA/ICA 50-69% stenotic  Right verterbral artery flow is antegrade  Left verterbral artery flow is antegrade  Individual velocities reviewed: Yes. Results were reviewed with the patient. Assessment      1. PVD (peripheral vascular disease) ([address]    2. Bilateral carotid artery stenosis          Plan      Recommend he continue Plavix and a statin daily  Recommend no smoking  Recommend good BP   Recommend daily exercise in moderation  We discussed obtaining a CTA of the head and neck.   He would like to think about his options and discuss this with his family and call me back regarding his wishes to proceed    Addendum:  CTA head/neck- (revewed by Dr. Vinicio Solis)  EXAM: Aylin Solano CONTRAST    DATE: 10/19/2021 12:48 PM   HISTORY: I65.23, bilateral carotid artery stenosis    COMPARISON: 4/6/2017   DLP: 1242 mGy cm. Automated exposure control was utilized to minimize   patient radiation dose. TECHNIQUE: Unenhanced and enhanced CT performed of the head with   multiplanar reformats. 3D postprocessing, including MIPs, performed   and images saved to PACS. Grading of carotid artery stenosis was performed according to the   NASCET criteria. FINDINGS:    No evidence of acute intracranial hemorrhage, midline shift or mass   effect. Periventricular and subcortical white matter hypodensity, most   commonly due to chronic microvascular changes. Proportional prominence   of the ventricles, sulci and basilar cisterns suggesting volume loss. Vascular calcifications. Thinning of the ocular lenses may be   postoperative or age-related. Paranasal sinuses and mastoid air cells   normally aerated. External auditory canals within normal limits. Calvarium appears intact. Overlying soft tissues within normal limits. Bilateral internal carotid artery calcified atherosclerosis with   multifocal stenosis, greatest in the cavernous segments. Right anterior cerebral artery aneurysm at the junction with the   anterior communicating artery measuring 7 mm. Bilateral anterior and   middle cerebral arteries patent without critical stenosis. Bilateral intracranial vertebral arteries patent with calcified   atherosclerosis. Basilar artery patent and normal caliber. Bilateral   posterior cerebral and superior cerebellar arteries patent and normal   caliber. Although this is a CTA exam, the visualized portion of the superior   sagittal sinus, straight sinus, transverse sinuses and sigmoid sinuses   are opacified.       Impression   1. 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 2. Chronic microvascular changes and volume loss.    Signed by Dr Rollins Door: CTA Ira Winston: 10/19/2021 12:48 PM   HISTORY: I65.23, bilateral carotid artery stenosis    COMPARISON: No existing relevant imaging studies available   DLP: 351 mGy cm. Automated exposure control was utilized to minimize   patient radiation dose. TECHNIQUE: Enhanced CT images obtained of the neck with multiplanar   reformats. 3D postprocessing, including MIPs, performed and images   saved to PACS. Grading of carotid stenosis performed using NASCET criteria. FINDINGS:    Visualized portion of the aorta has normal coarse and caliber with   moderate calcified atherosclerosis. 3 vessel arch configuration. Severe, greater than 70% stenosis of the right distal common carotid   artery and right proximal internal carotid artery due to calcified and   noncalcified atherosclerosis. Left carotid bifurcation and proximal internal carotid artery with   severe, greater than 70% stenosis due to calcified and noncalcified   atherosclerosis. Mild medialization of the left internal carotid   artery. Dominant left vertebral artery with calcified atherosclerosis and   multifocal mild to moderate stenosis. Right vertebral artery patent   with multifocal mild to moderate stenosis due to calcified   atherosclerosis. Soft tissues. The aerodigestive tract is within normal limits of appearance. No cervical adenopathy by size or morphologic criteria. Major salivary glands within normal limits of appearance. Thyroid is unremarkable. Paranasal sinuses, mastoid air cells, and middle ears are   well-aerated. Edentulous. Emphysema in the lung apices with biapical scarring. Degenerative changes in the visualized cervical spine without evidence   of severe spinal canal stenosis. See separately dictated CTA had of the same date.       Impression   1. Grading of carotid artery stenosis is performed by NASCET criteria.    2. Severe greater than 70% stenosis of the right distal common carotid   artery and right proximal internal carotid artery. 3. Severe greater than 70% stenosis of the left carotid bifurcation   and proximal left internal carotid artery. 4. Multifocal mild and moderate stenoses of the bilateral vertebral   arteries. 5. Emphysema in the lung apices with biapical scarring. Signed by Dr Marlen Farrar       Has reviewed both the CTA of the head and the neck. Both ICA show a greater than 70% stenosis therefore he would need a right carotid endarterectomy first followed by a left carotid endarterectomy at a later date. However the CT of the head revealed he has a 7 mm anterior cerebral artery aneurysm. Dr. Vicky Snider would like for us to refer the patient to neurosurgery for evaluation for this prior to proceeding with carotid endarterectomy. We will make a referral to neurosurgery. I have spoken with the patient's wife regarding the results of the CTA of the head and the neck and Dr. Vicky Snider recommendations. She is agreeable with the referral to neurosurgery for evaluation of his cerebral aneurysm prior to proceeding with carotid endarterectomy. Addendum:  [facility] neurosurgery does not see patients for cerebral aneurysm. Dr. Vicky Snider was made aware of this and we will refer the patient to Dr. Mo Woodward at [facility] for evaluation prior to carotid endarterectomy.

## 2021-09-29 ENCOUNTER — TELEPHONE (OUTPATIENT)
Dept: CARDIOLOGY CLINIC | Age: 81
End: 2021-09-29

## 2021-09-29 DIAGNOSIS — I70.219 ATHEROSCLEROSIS OF LOWER EXTREMITY WITH CLAUDICATION (HCC): Primary | ICD-10-CM

## 2021-09-29 RX ORDER — ACETYLCYSTEINE 100 MG/ML
SOLUTION ORAL; RESPIRATORY (INHALATION)
Qty: 6 ML | Refills: 0 | OUTPATIENT
Start: 2021-09-29

## 2021-09-29 NOTE — TELEPHONE ENCOUNTER
Patient would like orders sent to [facility] in [facility].

## 2021-09-29 NOTE — TELEPHONE ENCOUNTER
Patient's wife is calling in about getting a CT scan for the patient. I could not find any orders for it. Will someone reach out once orders have been place so she can schedule.

## 2021-09-29 NOTE — TELEPHONE ENCOUNTER
Patient has been scheduled for CTA abd/pelvis with IVF's pre and post on Monday October 4,2021 arriving at [facility] at 8:30am. This has been scheduled with [facility] from central scheduling. I have called Jhst in to [facility]. Patient and wife verbally understood and will call with questions. Patient will be NPO 4 hours prior. Family

## 2021-10-04 ENCOUNTER — HOSPITAL ENCOUNTER (OUTPATIENT)
Dept: CT IMAGING | Age: 81
Discharge: HOME | End: 2021-10-04
Payer: MEDICARE

## 2021-10-04 DIAGNOSIS — I70.219 ATHEROSCLEROSIS OF LOWER EXTREMITY WITH CLAUDICATION (HCC): ICD-10-CM

## 2021-10-04 PROCEDURE — 2500000003 HC RX 250 WO HCPCS: Performed by: PHYSICIAN ASSISTANT

## 2021-10-04 PROCEDURE — 2580000003 HC RX 258: Performed by: PHYSICIAN ASSISTANT

## 2021-10-04 PROCEDURE — 74174 CTA ABD&PLVS W/CONTRAST: CPT

## 2021-10-04 PROCEDURE — 6360000004 HC RX CONTRAST MEDICATION: Performed by: PHYSICIAN ASSISTANT

## 2021-10-04 RX ADMIN — IOPAMIDOL 90 ML: 755 INJECTION, SOLUTION INTRAVENOUS at 12:06

## 2021-10-04 RX ADMIN — SODIUM BICARBONATE: 84 INJECTION, SOLUTION INTRAVENOUS at 09:05

## 2021-10-04 NOTE — PROGRESS NOTES
Iv fluids completed, iv removed. I instructed the patient to call [facility] office to inquire about apt.   Patient discharged to home with his niece and spouse

## 2021-10-04 NOTE — PROGRESS NOTES
Patient is here today for a CTA of abdomen and pelvis w w/o contrast.  Patient stated he has taken his pre medications yesterday and today. Patient procedure verified and patient escorted by wheelchair to room 1 [facility]. Patient oriented to room and call light. Procedure explained and patient niece at bedside. Iv started and fluids infusing. Call light within reach. Niece has gone to lobby to stay with his wife. Notified ct tech Celio Escalante of patient fluid status and time patient available for CTA.

## 2021-10-06 ENCOUNTER — TELEPHONE (OUTPATIENT)
Dept: VASCULAR SURGERY | Age: 81
End: 2021-10-06

## 2021-10-06 DIAGNOSIS — I65.23 BILATERAL CAROTID ARTERY STENOSIS: Primary | ICD-10-CM

## 2021-10-06 NOTE — TELEPHONE ENCOUNTER
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 This can be the cause of Luis leg pain and hip pain. We are going to schedule his CTA head/neck with/without contrast to take care of his carotid arteries first, then we will discuss intervention with his leg once his carotid's are addressed. We have scheduled patient's CTA Head/Neck w/wo contrast with IVF's pre and post on Tuesday October 19,2021. Patient will need to arrive to [facility] to register at 10:00am. He will have a  and has been instructed to be NPO 4 hours prior, he may take his medications with a sip of water. We have called in Mucomyst medication to [facility] for her family to . He will take 1 tablet by mouth two times per day the day before his study,day of his study, and day following his study. Jj Snider PA-C will contact them once the Doctor has reviewed the study. Kristin verbally understood.  Instructions for upcomming test has been mailed to patient per patient's request.

## 2021-10-06 NOTE — LETTER
[facility]  MD Cornell Olivier DO Lyndall Clinton, APRN Ossie Rend, PA-C  Just a friendly reminder that you have an upcoming appointment with:  [address] [facility] [place], Vascular Surgery  [address], [address]  Office:  [phone]     Fax:  [phone]  A procedure has been scheduled for:  Patient Name: Amanda Rico  Appointment Date:10/19/21  Registration Time:10:00am   []  Mon             [x]  Tue             []  Wed             []  Thu             []  Fri  SCHEDULED PROCEDURE:  [] Bilateral Carotid Ultrasound [] Renal Ultrasound  [] Bilateral Lower Arterial Study [] Ultrasound of the Aorta  [] Venous Scan     Right          Left          Leg [x] CTA  Head/Neck  [] Norma Pari                                                                       [] RENAY's  [x] Nothing to eat or drink 4 hours prior to study                       [] Do not take your Glucophage the day of and two days after the test  [x] You will receive IV Fluids 2 hours before your study and 2 hours after your study. Please plan to be here approximately 5-6 hours. [x] Other: You will need to take Mucomyst 600mg twice a day (one tablet in the morning       and one tablet in the evening (starting the day before your vascular study, the day of       your vascular study, and the day after your vascular study. The Mucomyst has been        called to the [facility] which is located at the [facility]. You will need to go to the back of the building and enter that door which will be on the     2nd floor. You will have to take the elevator down to the first floor where the         Pharmacy is located.            LOCATION:  [x] [address]:  Located behind the hospital at [address] [facility] [address]  If you need to change or cancel your appointment, please contact our office within 24 hours of your scheduled appointment, or a charge may be incurred. This allows us to see another patient during your scheduled time. Thank you for your cooperation.

## 2021-10-19 ENCOUNTER — HOSPITAL ENCOUNTER (OUTPATIENT)
Dept: CT IMAGING | Age: 81
Discharge: HOME | End: 2021-10-19
Payer: MEDICARE

## 2021-10-19 DIAGNOSIS — I65.23 BILATERAL CAROTID ARTERY STENOSIS: ICD-10-CM

## 2021-10-19 PROCEDURE — 2500000003 HC RX 250 WO HCPCS: Performed by: PHYSICIAN ASSISTANT

## 2021-10-19 PROCEDURE — 70498 CT ANGIOGRAPHY NECK: CPT

## 2021-10-19 PROCEDURE — 70496 CT ANGIOGRAPHY HEAD: CPT

## 2021-10-19 PROCEDURE — 6360000004 HC RX CONTRAST MEDICATION: Performed by: PHYSICIAN ASSISTANT

## 2021-10-19 PROCEDURE — 2580000003 HC RX 258: Performed by: PHYSICIAN ASSISTANT

## 2021-10-19 RX ADMIN — SODIUM BICARBONATE: 84 INJECTION, SOLUTION INTRAVENOUS at 10:30

## 2021-10-19 RX ADMIN — IOPAMIDOL 90 ML: 755 INJECTION, SOLUTION INTRAVENOUS at 12:49

## 2021-10-19 NOTE — PROGRESS NOTES
CTA of head and neck completed. Patient back to [facility] getting post cta fluids. Patient family updated on status of patient.   Patient eating lunch at this time with no complaints

## 2021-10-22 ENCOUNTER — TELEPHONE (OUTPATIENT)
Dept: VASCULAR SURGERY | Age: 81
End: 2021-10-22

## 2021-10-22 NOTE — TELEPHONE ENCOUNTER
Kristin is calling in to discuss test results from the Carotid artery test.  She received results in [place] and is concerned and would like to discuss.

## 2021-11-01 ENCOUNTER — TELEPHONE (OUTPATIENT)
Dept: NEUROSURGERY | Age: 81
End: 2021-11-01

## 2021-11-01 ENCOUNTER — TELEPHONE (OUTPATIENT)
Dept: VASCULAR SURGERY | Age: 81
End: 2021-11-01

## 2021-11-01 DIAGNOSIS — I67.1 CEREBRAL ANEURYSM: Primary | ICD-10-CM

## 2021-11-01 NOTE — TELEPHONE ENCOUNTER
Anika from Neurosurgery called wanting to let office know that neither Dr. Ronel Miguel or Dr. Stella Manning see for cerebral aneurysm.     Thank you

## 2021-11-01 NOTE — TELEPHONE ENCOUNTER
Spoke to Pt wife. Pt wife wanted to know where else ould we ref pt to since [facility] doesn't deal with cerebral aneurysm. . told pt I will speak with sharee in the morining when she gets in office.  Pt voiced she understood

## 2021-11-02 ENCOUNTER — TELEPHONE (OUTPATIENT)
Dept: VASCULAR SURGERY | Age: 81
End: 2021-11-02

## 2021-11-02 NOTE — TELEPHONE ENCOUNTER
I had talked to the provider about this pt we are waiting on  to get back with the provider we will call the pt back with our recommendations.

## 2021-11-03 ENCOUNTER — TELEPHONE (OUTPATIENT)
Dept: VASCULAR SURGERY | Age: 81
End: 2021-11-03

## 2021-11-03 ENCOUNTER — TELEPHONE (OUTPATIENT)
Dept: NEUROSURGERY | Facility: CLINIC | Age: 81
End: 2021-11-03

## 2021-11-03 DIAGNOSIS — I67.1 ANTERIOR CEREBRAL ARTERY ANEURYSM: Primary | ICD-10-CM

## 2021-11-03 NOTE — TELEPHONE ENCOUNTER
----- Message from Magalie Pelaez PA-C sent at 11/3/2021  7:31 AM CDT -----  Please call and let Ms. Dilcia Johnson know that I spoke with Dr. Anupam Noel and will refer Mr. Dilcia Johsnon to Dr. Valentina Abdullahi at [facility].   If they are agreeable please make referral. Specify that this is somewhat urgent due to the fact that the patient is in need of bilateral carotid endarterectomies after being evaluated by Dr. Jered Timmons. Carrol Prescott

## 2021-11-03 NOTE — TELEPHONE ENCOUNTER
"Meme with Dr Henrik Serrano' office called stating patient has a 7 mm aneurysm that they believe needs to be treated.  She is wanting to refer him here for treatment.  I explained to her that we do not \"treat\" aneurysms @ [facility], we only follow them if no surgical intervention required.  She asked where we send our patients who need intervention and [facility] [place] or [place].  She thanked me and said she would let Dr Serrano know so he could contact the patient & his wife.      manan ewing CMA  "

## 2021-11-03 NOTE — TELEPHONE ENCOUNTER
I called and s/w patient's wife Kristin and made her aware per Tito Domingo and Dr. Shaila Ba; Please call and let Ms. Charlee Jackson know that I spoke with Dr. Shaila Ba and will refer . Charlee Jackson to Dr. Good Guidry at [facility]. If they are agreeable please make referral. Specify that this is somewhat urgent due to the fact that the patient is in need of bilateral carotid endarterectomies after being evaluated by Dr. Sabiha Perez. Kristin did not want to go to [facility], unfortunately [facility] does not treat Aneurysms. I made Kristin aware of this and she wants her  sent somewhere in [place], [place]. I have sent urgent referral to [facility] and [facility] Department of Neurosurgery. They will contact patient and our office with appointment information. Kristin requests the appointment be on a Tuesday or Wednesday afternoon. I made sure the Department was aware of this.

## 2021-11-04 NOTE — TELEPHONE ENCOUNTER
I s/w Kristin and gave her the number to [facility] and [facility] department of Neurosurgery. She is calling them to make her husbands appointment.

## 2021-11-04 NOTE — TELEPHONE ENCOUNTER
Kristin called again for an update. She hasn't heard anything from the offices she was referred to. Please touch base with her at your earliest convenience.

## 2022-10-03 ENCOUNTER — HOSPITAL ENCOUNTER (OUTPATIENT)
Dept: CT IMAGING | Age: 82
Discharge: HOME | End: 2022-10-03
Payer: MEDICARE

## 2022-10-03 VITALS
OXYGEN SATURATION: 96 % | BODY MASS INDEX: 23.98 KG/M2 | HEIGHT: 72 IN | RESPIRATION RATE: 18 BRPM | WEIGHT: 177 LBS | HEART RATE: 101 BPM | TEMPERATURE: 97.7 F

## 2022-10-03 DIAGNOSIS — I65.23 BILATERAL CAROTID ARTERY STENOSIS: ICD-10-CM

## 2022-10-03 DIAGNOSIS — I65.23 BILATERAL CAROTID ARTERY STENOSIS: Primary | ICD-10-CM

## 2022-10-03 PROCEDURE — 70496 CT ANGIOGRAPHY HEAD: CPT

## 2022-10-03 PROCEDURE — 2580000003 HC RX 258: Performed by: PHYSICIAN ASSISTANT

## 2022-10-03 RX ORDER — DEXTROSE AND SODIUM CHLORIDE 5; .45 G/100ML; G/100ML
INJECTION, SOLUTION INTRAVENOUS CONTINUOUS
Status: DISCONTINUED | OUTPATIENT
Start: 2022-10-03 | End: 2022-10-05 | Stop reason: HOSPADM

## 2022-10-03 RX ADMIN — DEXTROSE AND SODIUM CHLORIDE 800 ML: 5; 450 INJECTION, SOLUTION INTRAVENOUS at 11:20

## 2022-10-03 NOTE — PROGRESS NOTES
Post procedure IV fluids complete. Pt tolerated procedure and fluids well. Pt is being discharged to  home with a copy of his CD accompanied by his daughter Karina Rowe.

## 2022-10-03 NOTE — PROGRESS NOTES
Pt brought to [facility] HR 1 from the CT Dept. Pt's daughter states that the printed instructions Mr. Katiana Siegel received said to report to [facility]. The original order did not include IV fluids. Awaiting orders from [facility], [facility].

## 2022-10-03 NOTE — PROGRESS NOTES
Received faxed order from [facility]. Order has been entered into the computer and also faxed to Pharmacy.

## 2022-10-03 NOTE — PROGRESS NOTES
CTA complete. Post procedure IV fluids infusing. Pt is eating a turkey sandwich. Dtr, Idalia Diego, is at the bedside.

## 2022-10-07 ENCOUNTER — SCHEDULED TELEPHONE ENCOUNTER (OUTPATIENT)
Dept: VASCULAR SURGERY | Age: 82
End: 2022-10-07
Payer: MEDICARE

## 2022-10-07 DIAGNOSIS — I67.1 ANTERIOR CEREBRAL ARTERY ANEURYSM: ICD-10-CM

## 2022-10-07 DIAGNOSIS — I65.23 ASYMPTOMATIC BILATERAL CAROTID ARTERY STENOSIS: Primary | ICD-10-CM

## 2022-10-07 PROCEDURE — 99214 OFFICE O/P EST MOD 30 MIN: CPT | Performed by: PHYSICIAN ASSISTANT

## 2022-10-07 PROCEDURE — 1123F ACP DISCUSS/DSCN MKR DOCD: CPT | Performed by: PHYSICIAN ASSISTANT

## 2022-10-07 NOTE — PROGRESS NOTES
Patient Care Team:  Marla Garcia DO as PCP - General (Family Medicine)  Erin Doshi MD as Consulting Physician (Vascular Surgery)  Yovanny Gupta DO as Consulting Physician (Neurosurgery)  KANCHAN Harris as Nurse Practitioner (Family Nurse Practitioner)  Marcellus Sierra DO as Consulting Physician (Cardiology)      History and Physical: (Patient's wife was present during the televisit and contributed to the history)      Juliette Cunningham is a 81 y.o. male who has a past medical history includes CAD status post CABG and AICD, CHF, stage III chronic kidney disease, COPD, hyperlipidemia, hypertension, thyroid disease, cerebral aneurysm, retina repair x2 right eye and right eye lens replacement, carotid artery stenosis and PVD, status post right CFA endarterectomy and bilateral SFA stenting, may okay this was just.  Today we are following up for his carotid artery stenosis. Currently he is on Plavix 75 mg and atorvastatin 40 mg daily. He cannot take aspirin due to GI issues. His blood pressure is well controlled. She denies any new onset of partial or complete loss of vision affecting only one eye, speech difficulty or lateralizing weakness, numbness/tingling. His wife reports that he has poor vision and blurry vision in his right eye that is unchanged. When he was last seen in our office for carotid artery stenosis he had a 70 to 99% stenosis of his right ICA. A CTA head and neck was obtained which showed a greater then 70% stenosis of bilateral ICAs (asymptomatic). He was also found to have a Right anterior cerebral artery aneurysm at the junction with the anterior communicating artery measuring 7 mm. Dr. Frances Donovan reviewed the CTA head and neck and recommended he needed bilateral carotid endarterectomies, however he recommended we refer him to Neurosurgery Marcy Truong) in regards to his cerebral aneurysm. He was seen by Dr. Katerina Kramer on 11/18/2021.   At that time Dr. Katerina Kramer discussed options as well as risks and benefits of proceeding with surgery with the patient, patient's wife and his daughter. At that time due to the patient's age and comorbidities Dr. Erin Power recommended continued surveillance of the cerebral artery aneurysm. In regards to his carotid artery stenosis since he was asymptomatic and not a good candidate for endovascular intervention or TCAR he would be better served with endarterectomies. However, with his age and multiple comorbidities he would be a high risk surgical candidate and continued surveillance and medical management might be his best option. The patient and his family were agreeable with the plan.      Adams Cabral is a 81 y.o. male with the following history reviewed and recorded in [facility]:  Patient Active Problem List    Diagnosis Date Noted    3-vessel coronary artery disease      Priority: High     01/12/06 CABG x 6  LIMA-LAD, sequential VG-RI & OM, VG-acute maginal, PDA and PLOM Mayme Fortino, [id number] )  4/22/2008  SE  negative for myocardial ischemia  1/9/2012  lexiscan negative for myocardial ischemia, EF 53%  04/22/2015  Symptomatic paroxymal VT (longest 17 beats at 202 bpm, on metroprolol) on  Holter  04/23/2015  Cath  Patent bypass conduits, normal LVFX, no AI, abdominal aortogram ok  04/24/2015  Single lead ICDts,  3/10/16  VT storm  5/5/2017  lexiscan Positive for inferior myocardial ischemia, EF 65%, intermediate risk findings, AUC indication 16, AUC score 7  5/15/17  Cath  Patent LIMA-LAD, patent VG-OM, patent VG-RCA, normal LVFX                   Chronic diastolic (congestive) heart failure ([address] 08/25/2020    Acute on chronic systolic heart failure ([address] 33/45/6731     Diastolic suspected, uncertain but likely acute on chronic      Ischemic cardiomyopathy 07/30/2020     Remote history of severe LV systolic dysfunction, normalized more recently, normal LV systolic function by echocardiography, July 2020      Atherosclerosis of lower extremity with claudication ([facility] 06/29/2020    PVD (peripheral vascular disease) ([facility]     Spasm of piriformis muscle 01/28/2019    Myofascial pain 10/23/2018    Sacroiliitis ([facility] 10/23/2018    Lumbar radiculopathy 10/23/2018    Vitamin D deficiency 08/13/2018    Groin abscess     Postprocedural wound infection 05/22/2018    Atherosclerosis of artery of extremity with ulceration ([facility] 04/24/2018    Lumbar stenosis with neurogenic claudication 12/20/2017    Compression fracture of L4 lumbar vertebra 09/22/2017     Replacing deprecated diagnoses      S/P kyphoplasty 09/22/2017    Abnormal nuclear cardiac imaging test     SOB (shortness of breath) 04/25/2017    Fatigue 04/25/2017    Iron deficiency anemia 04/25/2017    History of amiodarone therapy 04/25/2017    Syncope     Carotid artery stenosis 04/21/2017    Chronic kidney disease (CKD), stage III (moderate) ([facility])     NSVT (nonsustained ventricular tachycardia)     S/P CABG x 6     Sustained VT (ventricular tachycardia)     Ventricular tachyarrhythmia     VT (ventricular tachycardia) 04/27/2015    Implantable cardioverter-defibrillator (ICD) in situ 04/27/2015    Hypercholesterolemia     Hypertension     COPD (chronic obstructive pulmonary disease) ([facility])     Anxiety     Depression      Current Outpatient Medications   Medication Sig Dispense Refill    acetylcysteine (MUCOMYST) 10 % nebulizer solution Take one 600 mg tablet by mouth BID the day before, day of, and day after procedure 6 mL 0    clopidogrel (PLAVIX) 75 MG tablet Take 1 tablet by mouth daily 30 tablet 3    clopidogrel (PLAVIX) 75 MG tablet Take 1 tablet by mouth daily 30 tablet 5    gabapentin (NEURONTIN) 100 MG capsule Take one cap po TID 90 capsule 1    acetylcysteine (MUCOMYST) 10 % nebulizer solution Procedure scheduled for 6/29/20. Mucomyst 600mg po bid the day before procedure, the day of procedure, the day after procedure.  1 mL 0    pantoprazole (PROTONIX) 40 MG tablet Take 1 tablet by mouth daily 30 tablet 5    magnesium (MAGNESIUM-OXIDE) 250 MG TABS tablet Take 250 mg by mouth 2 times daily      calcipotriene (DOVONEX) 0.005 % cream       furosemide (LASIX) 40 MG tablet Take 40 mg by mouth 2 times daily      furosemide (LASIX) 20 MG tablet Take 20 mg by mouth 2 times daily Total of 60 mg (20 mg tablet & 40 mg tablet      Cholecalciferol (VITAMIN D3) 50 MCG (2000 UT) CAPS Take 1 capsule by mouth daily      traZODone (DESYREL) 50 MG tablet Take 50 mg by mouth daily      Tiotropium Bromide-Olodaterol (STIOLTO RESPIMAT) 2.5-2.5 MCG/ACT AERS Inhale into the lungs 2 puffs daily      Ferrous Sulfate (IRON) 325 (65 Fe) MG TABS Take by mouth daily      guaiFENesin (MUCINEX) 600 MG extended release tablet Take 600 mg by mouth 2 times daily       DULoxetine (CYMBALTA) 60 MG extended release capsule Take 60 mg by mouth daily      Calcium Carb-Cholecalciferol (CALCIUM 600+D) 600-800 MG-UNIT TABS Take by mouth      Multiple Vitamins-Minerals (THERAPEUTIC MULTIVITAMIN-MINERALS) tablet Take 1 tablet by mouth daily      docusate sodium (COLACE) 100 MG capsule Take 1 capsule by mouth 2 times daily as needed for Constipation 30 capsule 1    ondansetron (ZOFRAN) 4 MG tablet Take 1 tablet by mouth every 8 hours as needed for Nausea or Vomiting 30 tablet 1    Misc. Devices [facility]) MISC 1 each by Does not apply route daily 1 each 0    OXYGEN Inhale 4 L into the lungs as needed      atorvastatin (LIPITOR) 40 MG tablet TAKE 1 TABLET DAILY 90 tablet 3    amiodarone (CORDARONE) 200 MG tablet Take 1 tablet by mouth daily 90 tablet 3    isosorbide mononitrate (IMDUR) 60 MG extended release tablet Take 1 tablet by mouth daily 90 tablet 3    KLOR-CON M20 20 MEQ extended release tablet Take 20 mEq by mouth daily       albuterol (PROVENTIL) (2.5 MG/3ML) 0.083% nebulizer solution Take 2.5 mg by nebulization every 6 hours as needed for Wheezing       No current facility-administered medications for this visit.      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 Darline Cr    CAD (coronary artery disease)     a. History of open-heart surgery X6, 01/12/06, utilizing the left VANCE to the LAD, sequential vein graft to the intermeditate and obtuse marginal, individually reverse saphenous vein graft to the acute maginal of the PDA and PLOM. b. Stress echocardiogram 04/22/08, negative for myocardial ischemia. Carotid artery stenosis     CHF (congestive heart failure) (H[facility])     Chronic kidney disease     Chronic kidney disease (CKD), stage III (moderate) ([facility])     COPD (chronic obstructive pulmonary disease) ([facility]     Depression     Gastric ulcer 11/06/2004    multiple, duodenal ulcer - Dr. Gloria Dinh    History of blood transfusion 01/2020    Hypercholesterolemia     Hypertension     Iron deficiency     hx iron infusions    MDRO (multiple drug resistant organisms) resistance 2018    post arteriogram    Thyroid disease     Ulcer 09/08/2010    Rectum. Ventricular tachyarrhythmia      Past Surgical History:   Procedure Laterality Date    BACK SURGERY  08/17/2017    CARDIAC CATHETERIZATION  4/22/15  JDT    EF 50%    CARDIAC DEFIBRILLATOR PLACEMENT  04/24/2015    AICD    CARDIAC SURGERY  1/06    Open Heart Surgery (x6)    CATARACT REMOVAL  05    COLON SURGERY  6/26/04    Colon reversal (10/18/2004); Colon re-reversal (10/26/2004). EYE SURGERY  2019    EYE SURGERY  2005    HERNIA REPAIR  10/26/2006    Ileostomy reversal and hernia repair.     KNEE SURGERY  1996    LAMINEC/FACETECT/FORAMIN,CERVICAL 1 SEG N/A 12/20/2017    L3-4 decompressive laminectomy using minimally invasive technique performed by Robin Olsen DO at [address] OR    OTHER SURGICAL HISTORY  11/2004    Bleeding ulcers. OTHER SURGICAL HISTORY  02/14/2007    Debridement of abdominal/with pulse irrigation. LA OFFICE/OUTPT VISIT,PROCEDURE ONLY N/A 8/20/2018    kyphoplasty of L5 performed by Robin Olsen DO at [facility] BL VES DIRECT,LOW EXTREM Right 5/23/2018    RIGHT GROIN, DEBRIDEMENT AND MUSCLE FLAP  performed by Mabel Campbell MD at [facility] V[facility] Right 4/24/2018    LOWER EXTREMITY ANGIOGRAM, BALLOON ANGIOPLASTY, ATHERECTOMY performed by Mabel Campbell MD at [address] [place] [address] Right 4/24/2018    COMMON FEMORAL ARTERY ENDARTERECTOMY performed by Mabel Campbell MD at [address] [place] [address]  05/30/2005    RETINAL DETACHMENT SURGERY  06/21/2005    VASCULAR SURGERY  06/05/2017    S[facility]. Left CFA 5f-6-f destination,aortogram with bilateral lower arteriogram,right SFA/Pop atherectomy CSI 1.25 solid,right SFA /pop balloon balloon angioplasty 6x150 mynx left CFA. VASCULAR SURGERY Right 04/24/18 SJS    Rigth common femoral/superficial femoral artery endarterectomy with right GSV patch, Left CFA 5F-6F sheath, RLE Angiogram, Right SFA/Popliteal balloon atherectomy with CSI 2.0 solid crown, Right Popliteal balloon angoplasty 5x150 lutonix, Right SFA Balloon Angioplasty 6x100 lutonix,    VASCULAR SURGERY  04/24/18 SJS    1. Right common femoral/superficial femoral artery endarterectomy with (R) GSV Patch. 2. Left CFA 5f-6f sheath. 3. RLE A'gram. 4. (R) SFA/popliteal atherectomy with CSI 2.0 solid crown. 5.(R) popliteal balloon angioplasty 5x150 lutonix 6. (R)SFA Balloon a'plasty 6x250, 6x100 LUTONIX 7. Completion RLE A'gram 8.  (L) CFA [facility]    VASCULAR SURGERY  06/29/2020    SJS-  Percutaneous cannulation right common femoral artery with 5 Western Aura and later 6 Western Aura Clever destination sheath suprarenal add aortogram with Bilateral iliofemoral ateriogram and BLE arteriogram balloon angioplasty L superficial femoral and above knee popliteal artery with 3.5 mm x 120 mm coyote balloon, 5 mm x 150 mm jami balloon, 6 mm x 150 mm  balloon, 6 mm x 40 mm conquest     VASCULAR SURGERY  2020    SJS- CONT: balloon left superficial femoral/popliteal artery stents ( supera 5.5 mm x 150 mm, 5.5 mm x 120 mm, Elisabeth 6 mm x 120 mm self-expanding drug-eluting stent) Balloon angioplasty L superficial femoral-popliteal artery stents with 6 mm x 150 mm  balloon completion LLE arteriograms minx closure R CFA puncture site      Family History   Problem Relation Age of Onset    Hypertension Mother     Diabetes Father     Dementia Father     Cancer Father     Colon Cancer Father     Hypertension Father     Cancer Brother     Esophageal Cancer Sister      Social History     Tobacco Use    Smoking status: Former     Packs/day: 1.50     Years: 50.00     Pack years: 75.00     Types: Cigarettes     Quit date: 2015     Years since quittin.4    Smokeless tobacco: Never   Substance Use Topics    Alcohol use: Not Currently         Review of Systems    Constitutional -   No fever or chills   HENT - no HA's, tinnitus, rhinorrhea, sore throat  Eyes - no sudden vision change or amaurosis. Respiratory - no SOB or chest pain  Cardiovascular - no chest pain, syncope, or significant dizziness. No palpitations   Gastrointestinal - no significant abdominal pain. No blood in stool. No diarrhea, nausea, or vomiting. Genitourinary - No difficulty urinating, dysuria, frequency, or urgency. No flank pain or hematuria. Musculoskeletal - no back pain or myalgia. Skin - no rashes or wounds   Neurologic - no dizziness, facial asymmetry, or light headedness. No seizures. No new onset of partial or complete loss of vision affecting only one eye, speech difficulty or lateralizing weakness, numbness/tingling (see HPI)  Hematologic - no excessive bleeding.   Psychiatric - no severe anxiety or nervousness. No confusion. All other review of systems are negative. Physical Exam  Due to this being a TeleHealth encounter, evaluation of the following organ systems is limited:   Constitutional -  No acute distress. Neurologic - alert and oriented X 3. Psychiatric - mood, affect, and behavior appear normal.  Judgment and thought processes appear normal.     Total Time: minutes: 21-30 minutes (total time spent reviewing external notes, tests, medication, telelvisit with the patient and his wife as well as documentation)    He was evaluated through a synchronous (real-time) audio encounter. Patient identification was verified at the start of the visit. He (or guardian if applicable) is aware that this is a billable service, which includes applicable co-pays. This visit was conducted with the patient's (and/or legal guardian's) verbal consent. He has not had a related appointment within my department in the past 7 days or scheduled within the next 24 hours. The patient was located in a state where the provider was licensed to provide care. Note: not billable if this call serves to triage the patient into an appointment for the relevant concern   Risk factors for atherosclerosis of all vascular beds have been reviewed with the patient including:  Family history, tobacco abuse in all forms, elevated cholesterol, hyperlipidemia, and diabetes. CTA Head/Neck - 10/3/22  Technique: CTA of the head and neck was obtained from the vertex of the skull to   the thoracic inlet. The study was obtained with IV contrast.CT protocol was   obtained. Radiation Output: CTDIvol 36.83/16.85 (mGy);  (mGy-cm)   This CT exam was performed using one or more of the following dose reduction   techniques: automated exposure control, adjustment of the mA and/or kV according   to patient size, and/or use of iterative reconstruction technique. Finding:  The CT angiogram of the head and neck demonstrate the right common   carotid artery has a severe calcified plaque is causing 70-85% stenosis of the   right common carotid at the bifurcation involving the right internal carotid   artery. There is heavily calcified plaque of the left common carotid artery   extending into the bifurcation and involving the left internal carotid artery at   its origin. The CT angiogram of the severe level demonstrates the vertebrobasilar   circulation both cavernous carotid both internal and external carotid artery   appears to be patent. There is scattered calcific plaque noted in the cavernous   carotid arteries bilaterally. There is atherosclerotic changes and calcified   plaque seen in bilateral vertebral artery extending to vertebrobasilar   circulation. The study demonstrates the the brain is unremarkable. The visualized paranasal   sinuses are clear. The mastoid air cells are clear. Both parotid glands and  is appears to be normal.The parapharyngeal,   , parotid and carotid spaces are unremarkable. There are no acute   changes or focal lesions. There is no abscess or focal fluid collection. There   is no mass or lymphadenopathy identified. The glottis, epiglottis and subglottic space shows no acute changes. The thyroid   glands demonstrate no focal lesion. The musculature of the posterior triangle is   normal.There is osteophytic changes and disc disease of lower cervical spine       Impression   Severe stenosis of both carotid arteries with calcified plaques which involve   the entire length of the carotid arteries. This is approximately 75-80%   stenosis. There is atherosclerotic changes of both vertebral arteries are calcified   plaque. Assessment      1. Asymptomatic bilateral carotid artery stenosis    2.  Anterior cerebral artery aneurysm          Plan      Recommend he continue Plavix 75 mg and atorvastatin 40 mg daily  Recommend no smoking  Recommend good BP and Lipid control  I will discuss the case with Dr. Cesar Mejia. After she has reviewed the imaging and documentation we will call patient with further recommendations/options/follow-up          Please note that parts of the chart were generated using Dragon dictation software. Although every effort was made to ensure the accuracy of this automated transcription, some errors in transcription may have occurred.

## 2022-10-17 ENCOUNTER — TELEPHONE (OUTPATIENT)
Dept: VASCULAR SURGERY | Age: 82
End: 2022-10-17

## 2022-10-17 DIAGNOSIS — I65.23 BILATERAL CAROTID ARTERY STENOSIS: Primary | ICD-10-CM

## 2022-10-17 NOTE — TELEPHONE ENCOUNTER
I spoke with the patient gave all below information, he confimed and voice understanding          ----- Message from Lizbeth Tiwari PA-C sent at 10/13/2022  4:35 PM CDT -----  Please call and let Mrs. Lamin Leo know that I spoke with Dr. Yogesh Lucero she is also in agreement with continuing to follow his carotid artery stenosis. We will repeat a carotid artery duplex scan in 6 months. He should go to the emergency room with any TIA/strokelike symptoms.   Please place order for carotid artery duplex scan in 6 months

## 2023-02-16 ENCOUNTER — OFFICE VISIT (OUTPATIENT)
Dept: CARDIOLOGY | Facility: CLINIC | Age: 83
End: 2023-02-16
Payer: COMMERCIAL

## 2023-02-16 ENCOUNTER — CLINICAL SUPPORT NO REQUIREMENTS (OUTPATIENT)
Dept: CARDIOLOGY | Facility: CLINIC | Age: 83
End: 2023-02-16
Payer: MEDICARE

## 2023-02-16 VITALS
OXYGEN SATURATION: 97 % | SYSTOLIC BLOOD PRESSURE: 152 MMHG | DIASTOLIC BLOOD PRESSURE: 62 MMHG | HEART RATE: 69 BPM | HEIGHT: 72 IN | BODY MASS INDEX: 22.75 KG/M2 | WEIGHT: 168 LBS

## 2023-02-16 DIAGNOSIS — Z95.810 AICD (AUTOMATIC CARDIOVERTER/DEFIBRILLATOR) PRESENT: Primary | ICD-10-CM

## 2023-02-16 DIAGNOSIS — I25.10 CORONARY ARTERY DISEASE INVOLVING NATIVE CORONARY ARTERY OF NATIVE HEART WITHOUT ANGINA PECTORIS: Primary | ICD-10-CM

## 2023-02-16 DIAGNOSIS — I25.5 ISCHEMIC CARDIOMYOPATHY: ICD-10-CM

## 2023-02-16 PROCEDURE — 99204 OFFICE O/P NEW MOD 45 MIN: CPT | Performed by: INTERNAL MEDICINE

## 2023-02-16 PROCEDURE — 93000 ELECTROCARDIOGRAM COMPLETE: CPT | Performed by: INTERNAL MEDICINE

## 2023-02-16 RX ORDER — POTASSIUM CHLORIDE 750 MG/1
10 TABLET, EXTENDED RELEASE ORAL DAILY
COMMUNITY

## 2023-02-16 RX ORDER — FERROUS SULFATE 325(65) MG
325 TABLET ORAL
COMMUNITY

## 2023-02-16 RX ORDER — DULOXETIN HYDROCHLORIDE 60 MG/1
60 CAPSULE, DELAYED RELEASE ORAL DAILY
COMMUNITY

## 2023-02-16 RX ORDER — TRAZODONE HYDROCHLORIDE 50 MG/1
50 TABLET ORAL NIGHTLY
COMMUNITY

## 2023-02-16 RX ORDER — ATORVASTATIN CALCIUM 40 MG/1
40 TABLET, FILM COATED ORAL NIGHTLY
COMMUNITY

## 2023-02-16 RX ORDER — MULTIPLE VITAMINS W/ MINERALS TAB 9MG-400MCG
1 TAB ORAL DAILY
COMMUNITY

## 2023-02-16 RX ORDER — ALBUTEROL SULFATE 2.5 MG/3ML
2.5 SOLUTION RESPIRATORY (INHALATION) EVERY 4 HOURS PRN
COMMUNITY

## 2023-02-16 RX ORDER — CLOPIDOGREL BISULFATE 75 MG/1
75 TABLET ORAL DAILY
COMMUNITY

## 2023-02-16 RX ORDER — CHOLECALCIFEROL (VITAMIN D3) 50 MCG
2000 TABLET ORAL DAILY
COMMUNITY

## 2023-02-16 RX ORDER — MULTIVITAMIN WITH IRON
2 TABLET ORAL DAILY
COMMUNITY

## 2023-02-16 RX ORDER — ISOSORBIDE MONONITRATE 60 MG/1
60 TABLET, EXTENDED RELEASE ORAL DAILY
COMMUNITY

## 2023-02-16 RX ORDER — FUROSEMIDE 40 MG/1
40 TABLET ORAL 2 TIMES DAILY
COMMUNITY

## 2023-02-16 RX ORDER — METOLAZONE 2.5 MG/1
2.5 TABLET ORAL 3 TIMES WEEKLY
COMMUNITY

## 2023-02-16 NOTE — PROGRESS NOTES
Subjective:     Encounter Date:02/16/2023      Patient ID: Fito Chan is a 82 y.o. male with coronary artery disease, remote coronary artery bypass grafting (2006), ischemic cardiomyopathy with single-chamber ICD in place, hypertension, hyperlipidemia, COPD who presents today to establish care.    Chief Complaint: Here to establish care, coronary artery disease, ischemic cardiomyopathy    History of Present Illness    This is an 82-year-old male who presents today to establish care.  He does have coronary artery disease with remote history of coronary artery bypass grafting.  He also has ischemic cardiomyopathy with single-chamber ICD in place.  The device has been appropriately functioning.  He notes that right after the device was implanted, he had a shock from the device but has never required any subsequent therapies from the device.  He was on amiodarone for some time but developed some pulmonary toxicity therefore this has been discontinued.  In regards to coronary artery disease, he denies having any chest pain.  He does have chronic shortness of breath and dyspnea with exertion.  He also notes intermittent wheezing that has been fairly consistent and not increasing in frequency or severity.  No other significant symptoms at this time including palpitations, lightheadedness, dizziness, syncope.  He does have peripheral vascular disease with a cerebral aneurysm, carotid artery stenosis, lower extremity disease that has been revascularized.  He notes some coolness to his lower extremities and some mild edema but nothing significant as of late.  His blood pressure is generally well controlled.  He denies having any side effects from his current medications.  He is not on aspirin but is maintained on long-term Plavix therapy.    The following portions of the patient's history were reviewed and updated as appropriate: allergies, current medications, past family history, past medical history, past social  history, past surgical history and problem list.     Past Medical History:   Diagnosis Date   • CAD (coronary artery disease)    • COPD (chronic obstructive pulmonary disease) ([facility])    • Hyperlipidemia    • Hypertension    • Ischemic cardiomyopathy    • PAD (peripheral artery disease) ([facility])      Past Surgical History:   Procedure Laterality Date   • CARDIAC DEFIBRILLATOR PLACEMENT      Single-chamber   • COLON RESECTION     • CORONARY ARTERY BYPASS GRAFT      2006: LIMA to LAD, sequential vein graft to the ramus intermedius and obtuse marginal, vein graft to the acute margin of the PDA and what is described as PL   • FEMORAL POPLITEAL BYPASS     • HERNIA REPAIR     • KNEE SURGERY     • RETINAL DETACHMENT SURGERY         Current Outpatient Medications:   •  albuterol (PROVENTIL) (2.5 MG/3ML) 0.083% nebulizer solution, Take 2.5 mg by nebulization Every 4 (Four) Hours As Needed for Wheezing., Disp: , Rfl:   •  atorvastatin (LIPITOR) 40 MG tablet, Take 40 mg by mouth Every Night., Disp: , Rfl:   •  Calcium Carb-Cholecalciferol (CALCIUM 600 + D PO), Take 1 tablet by mouth Daily., Disp: , Rfl:   •  Cholecalciferol (Vitamin D) 50 MCG (2000 UT) tablet, Take 2,000 Units by mouth Daily., Disp: , Rfl:   •  clopidogrel (PLAVIX) 75 MG tablet, Take 75 mg by mouth Daily., Disp: , Rfl:   •  DULoxetine (CYMBALTA) 60 MG capsule, Take 60 mg by mouth Daily., Disp: , Rfl:   •  ferrous sulfate 325 (65 FE) MG tablet, Take 325 mg by mouth Daily With Breakfast., Disp: , Rfl:   •  furosemide (LASIX) 40 MG tablet, Take 40 mg by mouth 2 (Two) Times a Day., Disp: , Rfl:   •  isosorbide mononitrate (IMDUR) 60 MG 24 hr tablet, Take 60 mg by mouth Daily., Disp: , Rfl:   •  Magnesium 250 MG tablet, Take 2 tablets by mouth Daily., Disp: , Rfl:   •  metOLazone (ZAROXOLYN) 2.5 MG tablet, Take 2.5 mg by mouth 3 (Three) Times a Week., Disp: , Rfl:   •  multivitamin with minerals tablet tablet, Take 1 tablet by mouth Daily., Disp: , Rfl:   •  potassium  "chloride (K-DUR,KLOR-CON) 10 MEQ CR tablet, Take 10 mEq by mouth Daily., Disp: , Rfl:   •  tiotropium bromide-olodaterol (STIOLTO RESPIMAT) 2.5-2.5 MCG/ACT aerosol solution inhaler, Inhale 2 puffs Daily., Disp: , Rfl:   •  traZODone (DESYREL) 50 MG tablet, Take 50 mg by mouth Every Night., Disp: , Rfl:     Allergies   Allergen Reactions   • Clarithromycin Other (See Comments)   • Trimethoprim Anaphylaxis   • Clonidine Other (See Comments)       HYPOTENSION   • Levofloxacin Other (See Comments)     \"can't take with heart medicine\"       • Lorazepam Other (See Comments)     \"makes him wild\"     • Sulfa Antibiotics Rash   • Benzocaine Other (See Comments)     Social History     Tobacco Use   • Smoking status: Former     Types: Cigarettes   • Smokeless tobacco: Never   Substance Use Topics   • Alcohol use: Yes     Comment: rarely     Family History   Problem Relation Age of Onset   • Colon cancer Father        Review of Systems   Constitutional: Positive for malaise/fatigue and weight loss. Negative for chills and fever.   HENT: Negative for congestion and hearing loss.    Eyes: Negative for blurred vision and pain.   Cardiovascular: Positive for leg swelling. Negative for chest pain, dyspnea on exertion, orthopnea, palpitations, paroxysmal nocturnal dyspnea and syncope.   Respiratory: Positive for shortness of breath and wheezing. Negative for cough.    Endocrine: Negative for cold intolerance and heat intolerance.   Hematologic/Lymphatic: Negative for adenopathy and bleeding problem.   Skin: Negative for color change, poor wound healing and rash.   Musculoskeletal: Negative for myalgias and neck pain.   Gastrointestinal: Negative for abdominal pain, nausea and vomiting.   Genitourinary: Negative for dysuria and frequency.   Neurological: Negative for dizziness, headaches, light-headedness, loss of balance and numbness.   Psychiatric/Behavioral: Negative for altered mental status and memory loss. "   Allergic/Immunologic: Negative for hives and persistent infections.       ECG 12 Lead    Date/Time: 2/16/2023 3:33 PM  Performed by: Sivakumar Pham MD  Authorized by: Sivakumar Pham MD   Previous ECG: no previous ECG available  Rhythm: sinus rhythm  Rate: normal  BPM: 73  Conduction: right bundle branch block  QRS axis: normal  Other findings: non-specific ST-T wave changes    Clinical impression: abnormal EKG             Objective:     Vitals reviewed.   Constitutional:       General: Not in acute distress.     Appearance: Normal and healthy appearance. Well-developed. Not toxic-appearing or diaphoretic.   Eyes:      General: Lids are normal.      Extraocular Movements: Extraocular movements intact.      Pupils: Pupils are equal, round, and reactive to light.   HENT:      Head: Normocephalic and atraumatic.      Right Ear: External ear normal.      Left Ear: External ear normal.      Nose: Nose normal.    Mouth/Throat:      Mouth: Mucous membranes are not pale, not dry and not cyanotic.   Neck:      Thyroid: No thyroid mass or thyromegaly.      Vascular: No carotid bruit, hepatojugular reflux or JVD.      Trachea: No tracheal deviation.      Lymphadenopathy: No cervical adenopathy.   Pulmonary:      Effort: Pulmonary effort is normal. No accessory muscle usage or respiratory distress.      Breath sounds: Normal breath sounds. No wheezing. No rhonchi. No rales.   Chest:      Chest wall: Not tender to palpatation.   Cardiovascular:      Normal rate. Regular rhythm.      Murmurs: There is no murmur.      No gallop.   Pulses:     Intact distal pulses.   Edema:     Peripheral edema absent.   Abdominal:      General: Bowel sounds are normal. There is no distension or abdominal bruit.      Palpations: Abdomen is soft.      Tenderness: There is no abdominal tenderness.   Musculoskeletal: Normal range of motion.         General: No tenderness or deformity.      Extremities: No clubbing present.      "Cervical back: Normal range of motion and neck supple. No edema. Skin:     General: Skin is warm and dry. There is no cyanosis.      Findings: No erythema or rash.   Neurological:      General: No focal deficit present.      Mental Status: Oriented to person, place, and time and oriented to person, place and time.      Cranial Nerves: No cranial nerve deficit.   Psychiatric:         Attention and Perception: Attention normal.         Mood and Affect: Mood normal.         Speech: Speech normal.         Behavior: Behavior normal. Behavior is cooperative.         Thought Content: Thought content normal.       /62   Pulse 69   Ht 182.9 cm (72\")   Wt 76.2 kg (168 lb)   SpO2 97%   BMI 22.78 kg/m²     Data/Lab Review:     Echocardiogram at [facility] on 6/14/2021:      ========================================================    I reviewed previous office notes from Dr. Jacques (2020) which indicate that the patient did undergo coronary artery bypass grafting in 2006 consisting of LIMA to LAD, saphenous vein graft to the PDA and PL as well as sequential vein graft to the ramus intermedius and obtuse marginal.  In addition, notes indicate a single-chamber ICD in place.    ========================================================    Previous records indicate a Medtronic ICD in place.        Assessment:          Diagnosis Plan   1. Coronary artery disease involving native coronary artery of native heart without angina pectoris  ECG 12 Lead      2. Ischemic cardiomyopathy               Plan:       1.  Coronary artery disease: This patient is stable at this time and does not describe any ischemic symptoms.  Long-term, he has been maintained on Plavix monotherapy, largely due to his history of peripheral vascular disease.  He is not on beta-blocker therapy but does remain on isosorbide mononitrate and statin therapies.  At this time, we will not change medications and see the patient back in 6 months unless otherwise needed " sooner.    2.  Ischemic cardiomyopathy: The patient previously had a low ejection fraction therefore required a primary prevention ICD but his ejection fraction at last check, noted above, is now normal.  We will have his ICD interrogations transitioned to our office.  He is not on goal-directed medical therapy for his systolic heart failure but has done well for a number of years therefore we will not change therapies at this time.    -We will plan to see the patient again in 6 months in the office unless otherwise needed sooner.

## 2023-04-20 ENCOUNTER — HOSPITAL ENCOUNTER (OUTPATIENT)
Dept: CT IMAGING | Age: 83
Discharge: HOME | End: 2023-04-20
Payer: MEDICARE

## 2023-04-20 DIAGNOSIS — I67.1 ANTERIOR CEREBRAL ARTERY ANEURYSM: ICD-10-CM

## 2023-04-20 DIAGNOSIS — I67.1 ANTERIOR CEREBRAL ARTERY ANEURYSM: Primary | ICD-10-CM

## 2023-04-20 PROCEDURE — 70496 CT ANGIOGRAPHY HEAD: CPT

## 2023-04-20 PROCEDURE — 6360000004 HC RX CONTRAST MEDICATION: Performed by: PHYSICIAN ASSISTANT

## 2023-04-20 PROCEDURE — 2580000003 HC RX 258: Performed by: PHYSICIAN ASSISTANT

## 2023-04-20 PROCEDURE — 2500000003 HC RX 250 WO HCPCS: Performed by: PHYSICIAN ASSISTANT

## 2023-04-20 PROCEDURE — 82565 ASSAY OF CREATININE: CPT

## 2023-04-20 PROCEDURE — 70496 CT ANGIOGRAPHY HEAD: CPT | Performed by: RADIOLOGY

## 2023-04-20 RX ADMIN — IOPAMIDOL 70 ML: 755 INJECTION, SOLUTION INTRAVENOUS at 13:22

## 2023-04-20 RX ADMIN — SODIUM BICARBONATE: 84 INJECTION, SOLUTION INTRAVENOUS at 10:45

## 2023-04-21 ENCOUNTER — TELEPHONE (OUTPATIENT)
Dept: VASCULAR SURGERY | Age: 83
End: 2023-04-21

## 2023-04-21 NOTE — TELEPHONE ENCOUNTER
Contacted the patient's daughter to let her know the following. The patient's daughter stated that he was a patient with Dr. Juliana Chandler at [facility]. She stated that her Father has opted out of receiving any surgery to fix his aneurysm at this time and they can't do anything with the carotid arteries until the aneurysm is fixed. She stated that Dr. Juliana Chandler is taking care of all of that. She did say that we could follow up on his PVD for his legs going forward. I let her know we would send the information to Dr. Juliana Chandler and make him a follow up appointment for the PVD. The patient's Father acknowledged. ----- Message from Mohamud Best PA-C sent at 4/21/2023  2:17 PM CDT -----  Please call Mr. Cory Casiano daughter and let her know CT scan of the head results/he does not appear that the aneurysm has enlarged in size. We will be faxing this to Dr. Mis Pizano.  Does she still want us to follow him for his carotid artery stenosis since Dr. Matt is going to be doing any procedure that he might need done or do they want us to schedule follow upfor his cerebral aneurysm, carotid arteries and his PVD. Please let me know.  Thank you

## 2023-08-16 ENCOUNTER — OFFICE VISIT (OUTPATIENT)
Dept: CARDIOLOGY | Facility: CLINIC | Age: 83
End: 2023-08-16
Payer: MEDICARE

## 2023-08-16 VITALS
OXYGEN SATURATION: 92 % | BODY MASS INDEX: 21.94 KG/M2 | SYSTOLIC BLOOD PRESSURE: 160 MMHG | HEIGHT: 72 IN | DIASTOLIC BLOOD PRESSURE: 68 MMHG | WEIGHT: 162 LBS | HEART RATE: 66 BPM

## 2023-08-16 DIAGNOSIS — I25.10 CORONARY ARTERY DISEASE INVOLVING NATIVE CORONARY ARTERY OF NATIVE HEART WITHOUT ANGINA PECTORIS: Primary | ICD-10-CM

## 2023-08-16 DIAGNOSIS — I25.5 ISCHEMIC CARDIOMYOPATHY: ICD-10-CM

## 2023-08-16 PROCEDURE — 1159F MED LIST DOCD IN RCRD: CPT | Performed by: INTERNAL MEDICINE

## 2023-08-16 PROCEDURE — 93000 ELECTROCARDIOGRAM COMPLETE: CPT | Performed by: INTERNAL MEDICINE

## 2023-08-16 PROCEDURE — 99214 OFFICE O/P EST MOD 30 MIN: CPT | Performed by: INTERNAL MEDICINE

## 2023-08-16 PROCEDURE — 1160F RVW MEDS BY RX/DR IN RCRD: CPT | Performed by: INTERNAL MEDICINE

## 2023-08-16 NOTE — PROGRESS NOTES
Subjective:     Encounter Date:08/16/2023      Patient ID: Fito Chan is a 83 y.o. male with coronary artery disease, remote coronary artery bypass grafting (2006), ischemic cardiomyopathy with single-chamber ICD in place, hypertension, hyperlipidemia, COPD who presents today for routine follow-up.    Chief Complaint: Here for follow-up of CAD, ischemic cardiomyopathy    History of Present Illness    This patient presents today for follow-up of coronary artery disease and ischemic cardiomyopathy.  He has been doing reasonably well.  He denies having any new symptoms since his last saw him.  He does continue to have some mild shortness of breath and dyspnea with exertion but he generally relates this to being outside in the heat at times.  He denies having any increase in symptoms.  His weight has been stable.  He denies having lower extremity edema.  He denies having any chest discomfort.  He also denies having palpitations, lightheadedness, dizziness or syncope.  He denies having any problems with his ICD.  He does remain on Plavix.  No significant bleeding issues have been noted.  Blood pressure is generally reasonably well controlled.  He denies having any side effects from current medications.      Current Outpatient Medications:     albuterol (PROVENTIL) (2.5 MG/3ML) 0.083% nebulizer solution, Take 2.5 mg by nebulization Every 4 (Four) Hours As Needed for Wheezing., Disp: , Rfl:     atorvastatin (LIPITOR) 40 MG tablet, Take 1 tablet by mouth Every Night., Disp: , Rfl:     Calcium Carb-Cholecalciferol (CALCIUM 600 + D PO), Take 1 tablet by mouth Daily., Disp: , Rfl:     Cholecalciferol (Vitamin D) 50 MCG (2000 UT) tablet, Take 1 tablet by mouth Daily., Disp: , Rfl:     clopidogrel (PLAVIX) 75 MG tablet, Take 1 tablet by mouth Daily., Disp: , Rfl:     DULoxetine (CYMBALTA) 60 MG capsule, Take 1 capsule by mouth Daily., Disp: , Rfl:     ferrous sulfate 325 (65 FE) MG tablet, Take 1 tablet by mouth Daily  "With Breakfast., Disp: , Rfl:     furosemide (LASIX) 40 MG tablet, Take 1 tablet by mouth 2 (Two) Times a Day., Disp: , Rfl:     isosorbide mononitrate (IMDUR) 60 MG 24 hr tablet, Take 1 tablet by mouth Daily., Disp: , Rfl:     Magnesium 250 MG tablet, Take 2 tablets by mouth Daily., Disp: , Rfl:     metOLazone (ZAROXOLYN) 2.5 MG tablet, Take 1 tablet by mouth 3 (Three) Times a Week., Disp: , Rfl:     multivitamin with minerals tablet tablet, Take 1 tablet by mouth Daily., Disp: , Rfl:     potassium chloride (K-DUR,KLOR-CON) 10 MEQ CR tablet, Take 1 tablet by mouth Daily., Disp: , Rfl:     tiotropium bromide-olodaterol (STIOLTO RESPIMAT) 2.5-2.5 MCG/ACT aerosol solution inhaler, Inhale 2 puffs Daily., Disp: , Rfl:     traZODone (DESYREL) 50 MG tablet, Take 1 tablet by mouth Every Night., Disp: , Rfl:     Allergies   Allergen Reactions    Clarithromycin Other (See Comments)    Trimethoprim Anaphylaxis    Clonidine Other (See Comments)       HYPOTENSION    Levofloxacin Other (See Comments)     \"can't take with heart medicine\"        Lorazepam Other (See Comments)     \"makes him wild\"      Sulfa Antibiotics Rash    Benzocaine Other (See Comments)     Social History     Tobacco Use    Smoking status: Former     Types: Cigarettes    Smokeless tobacco: Never   Substance Use Topics    Alcohol use: Yes     Comment: rarely     Review of Systems   Constitutional: Negative for fever and weight loss.   Cardiovascular:  Positive for dyspnea on exertion. Negative for chest pain, leg swelling, orthopnea, palpitations, paroxysmal nocturnal dyspnea and syncope.   Respiratory:  Positive for shortness of breath. Negative for cough and wheezing.    Gastrointestinal:  Negative for abdominal pain, nausea and vomiting.   Neurological:  Negative for dizziness, headaches and loss of balance.       ECG 12 Lead    Date/Time: 8/16/2023 4:06 PM  Performed by: Sivakumar Pham MD  Authorized by: Sivakumar Pham MD   Comparison: " "compared with previous ECG from 2/16/2023  Similar to previous ECG  Rhythm: sinus rhythm and sinus arrhythmia  Rate: normal  BPM: 74  Conduction: right bundle branch block  QRS axis: normal    Clinical impression: abnormal EKG         Objective:     Vitals reviewed.   Constitutional:       General: Not in acute distress.     Appearance: Well-developed and not in distress.   Eyes:      Extraocular Movements: Extraocular movements intact.   HENT:      Head: Normocephalic and atraumatic.   Pulmonary:      Effort: Pulmonary effort is normal.      Breath sounds: Normal breath sounds. No wheezing. No rhonchi. No rales.   Cardiovascular:      Normal rate. Regular rhythm.      Murmurs: There is no murmur.      No gallop.    Pulses:     Intact distal pulses.   Edema:     Peripheral edema absent.   Abdominal:      General: Bowel sounds are normal. There is no distension.      Palpations: Abdomen is soft.      Tenderness: There is no abdominal tenderness.   Skin:     General: Skin is warm and dry. There is no cyanosis.      Findings: No erythema or rash.   Neurological:      Mental Status: Alert and oriented to person, place, and time.      Cranial Nerves: No cranial nerve deficit.     /68   Pulse 66   Ht 182.9 cm (72\")   Wt 73.5 kg (162 lb)   SpO2 92%   BMI 21.97 kg/mý     Data/Lab Review:     I reviewed the patient's ICD interrogation from May 2023.  This shows normal device function with normal threshold, impedance, sensing.  Adequate battery reserve noted.    =======================================================    [place] [facility] [place]:       ========================================================     Previous office notes from Dr. Jacques (2020) indicate that the patient did undergo coronary artery bypass grafting in 2006 consisting of LIMA to LAD, saphenous vein graft to the PDA and PL as well as sequential vein graft to the ramus intermedius and obtuse marginal.  In addition, notes indicate a " single-chamber ICD in place.         Assessment:          Diagnosis Plan   1. Coronary artery disease involving native coronary artery of native heart without angina pectoris  ECG 12 Lead      2. Ischemic cardiomyopathy             Plan:       1.  Coronary artery disease: Stable at this time.  The patient denies having any new symptoms.  He has been maintained on Plavix for a number of years due to history of significant peripheral vascular disease.  He also remains on high intensity statin therapy, isosorbide mononitrate.  No changes recommended at this time.     2.  Ischemic cardiomyopathy: Stable device function on check noted in May.  He is due for an ICD interrogation again in February.  The patient describes stable class II or III symptoms at this time with shortness of breath that does wax and wane to some degree.  There is no evidence of volume overload on today's exam.  He does not take conventional goal-directed medical therapy but has been stable for a number of years, therefore we have not pushed the issue at this time.  His blood pressure is elevated today but generally is very well controlled according to the patient's report.  There may be some element of whitecoat hypertension.  In any event, he is thought to be stable at this time therefore we will not alter therapies.     We will plan to see the patient again in 6 months unless otherwise needed sooner at which point he will also need his ICD interrogated once again.

## 2023-09-27 NOTE — PROGRESS NOTES
Single Chamber AICD Interrogation Report  IN OFFICE    February 16, 2023    Primary Cardiologist: Ankit  : Medtronic Model: Dori S VR [id number]  Implant date: 4/24/2015    Reason for evaluation:  New Patient Establishing Care with [facility]  Indication for AICD: Ischemic Cardiomyopathy    Measurements  Ventricular sensing - R wave: 10.1 mV  Ventricular threshold: 0.75 V @ 0.4 ms  Ventricular lead impedance: 475 ohms  Shock Impedance: RV 47 ohms  SVC 57 ohms    Manual sensing and threshold testing performed: Yes     Diagnostic Data  Paced: <0.1 %    Episodes/Alerts since 1/11/2023:  None.    Battery status: Satisfactory, estimated 3 years remaining     Final Parameters  Mode: VVI  Lower rate: 40 bpm   Ventricular - Amplitude: 2 V   Pulse width: 0.4 ms   Sensitivity: 0.3 mV     Changes made: Dr. Pham's information added to device.    Conclusions: Normal AICD Function, No Therapy Delivered, Stable Pacing and Sensing Thresholds, and Adequate Battery Reserve    Remote Monitor:  Yes, transfer request sent in [employer].    Follow up: Every 3 months via carelink, annually in office.

## 2023-10-09 DIAGNOSIS — I70.213 ATHEROSCLER OF NATIVE ARTERY OF BOTH LEGS WITH INTERMIT CLAUDICATION (HCC): Primary | ICD-10-CM

## 2023-10-13 ENCOUNTER — TELEPHONE (OUTPATIENT)
Dept: VASCULAR SURGERY | Age: 83
End: 2023-10-13

## 2023-11-29 ENCOUNTER — TELEPHONE (OUTPATIENT)
Dept: VASCULAR SURGERY | Age: 83
End: 2023-11-29

## 2023-12-08 ENCOUNTER — TELEPHONE (OUTPATIENT)
Dept: CARDIOLOGY | Facility: CLINIC | Age: 83
End: 2023-12-08
Payer: MEDICARE

## 2023-12-08 NOTE — TELEPHONE ENCOUNTER
Called patient to let them know that their insurance will be out of network for [facility] as of Jan 1, 2024 for device follow up and remote moniotring. The last day to change their insurance plan is Dec 7th 2023. The patient plans to contact insurance for a continuation of care.

## 2024-10-19 NOTE — H&P (VIEW-ONLY)
[facility]  H&P    Patient:   Diane Plaza  MR#:    [id number]      YOB: 1940  Date of Evaluation:  8/9/2018  Time of Note:                          5:58 PM  Primary/Referring Physician:  KANCHAN Will   Note Author:   KANCHAN Jo        Chief Complaint   Patient presents with    Follow-up     discuss possible Kypho - new compression fracture       HISTORY OF PRESENT ILLNESS:       Diane Plaza is a 69 y.o. male who underwent a L3-4 decompressive laminectomy using minimally invasive technique for spinal stenosis of lumbar region on 12/20/2017 and now he is 8 months out from his surgery. Prior to surgery he complained of low back pain at the belt line. The pain did radiate into the left leg intermittently. He denied numbness. He could not stand for very long periods of time, he had frequent falls. Today he states that his back and bilateral leg pain with L>R. He describes the back pain as radiating into the left posterolateral thigh only when up and walking. Standing and walking make the pain worse. The pain started about 2 weeks ago. The daughter reports that he has been getting high doses of steroids and knows that this can effect the bone quality. He does states that the back pain is definitely worse than the leg pain. He is no longer taking his ASA. 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 Lorenzo Grider CAD (coronary artery disease)     a. History of open-heart surgery X6, 01/12/06, utilizing the left VANCE to the LAD, sequential vein graft to the intermeditate and obtuse marginal, individually reverse saphenous vein graft to the acute maginal of the PDA and PLOM. b. Stress echocardiogram 04/22/08, negative for myocardial ischemia.     Carotid artery stenosis     Chronic kidney disease     Chronic kidney disease tablet, Rfl: 3    isosorbide mononitrate (IMDUR) 60 MG extended release tablet, Take 1 tablet by mouth daily, Disp: 90 tablet, Rfl: 3    KLOR-CON M20 20 MEQ extended release tablet, Take 20 mEq by mouth daily , Disp: , Rfl:     albuterol (PROVENTIL) (2.5 MG/3ML) 0.083% nebulizer solution, Take 2.5 mg by nebulization every 6 hours as needed for Wheezing, Disp: , Rfl:   Butamben-tetracaine-benzocaine; Ativan [lorazepam]; Bactrim; Biaxin [clarithromycin]; Demerol; Levaquin [levofloxacin]; Lorazepam; and Sulfa antibiotics    Social History  History   Smoking Status    Former Smoker    Packs/day: 1.50    Years: 50.00    Types: Cigarettes    Quit date: 4/17/2015   Smokeless Tobacco    Never Used     History   Alcohol Use    0.6 oz/week    1 Cans of beer per week     Comment: weekly         Family History   Problem Relation Age of Onset    Hypertension Mother     Diabetes Father     Dementia Father     Cancer Father     Colon Cancer Father     Hypertension Father     Cancer Brother     Esophageal Cancer Sister        Review of Systems   Constitutional: Negative for chills, diaphoresis, fever, malaise/fatigue and weight loss. HENT: Negative. Eyes: Negative. Respiratory: Positive for shortness of breath. Negative for cough, hemoptysis, sputum production and wheezing. Cardiovascular: Negative. Gastrointestinal: Negative. Genitourinary: Negative. Not getting the \"done\" feeling   Musculoskeletal: Positive for back pain and myalgias. Negative for falls, joint pain and neck pain. Skin: Negative. Neurological: Positive for weakness (legs). Negative for dizziness, tingling, tremors, sensory change, speech change, focal weakness, seizures, loss of consciousness and headaches. Endo/Heme/Allergies: Negative. Psychiatric/Behavioral: Negative.         PHYSICAL EXAM:  Vitals:    08/09/18 1429   BP: (!) 152/89   Pulse:    SpO2:      Constitutional: The patient appears well-developed and recent fracture or compression. There is a mild dextroscoliosis. The curve and alignment are normal. The vertebral body heights are   normal.   Chronic degenerative changes are seen in the form of osteophytes. The   posterior processes and pedicles are intact. An AICD seen in place.       Impression   No acute bony abnormality. Chronic degenerative changes of the thoracic spine. Signed by Dr Lali Camacho on 8/6/2018 3:22 PM         Narrative   EXAMINATION: Alicia Solomon LUMBAR SPINE (2-3 VIEWS)  8/6/2018 3:22 PM   HISTORY: Chronic midline low back pain with left-sided sciatica M   54.42 and G 89.29.   COMPARISON: 12/5/2017. TECHNIQUE: 2 views were obtained. FINDINGS: Severe compression deformity of L4 with prior kyphoplasty is   unchanged. There is new approximate 40-50% compression deformity of   L5. Disc narrowing at L2-3 and L3-4 is stable. There is facet   arthropathy from L3 through S1. There is atheromatous calcification of   the aortoiliac vessels.       Impression   1. There is new approximate 40-50% compression deformity of L5.   2. Stable severe compression deformity of L4 with prior kyphoplasty. 3. Degenerative changes, as described. Signed by Dr Romeo Shaw on 8/6/2018 3:29 PM   I have personally reviewed the images and my interpretation is:  New L5 compression fracture        ASSESSMENT:     Juan Storey is a 69 y.o. male who underwent a L3-4 decompressive laminectomy using minimally invasive technique for spinal stenosis of lumbar region on 12/20/2017 and now he is 8 months out from his surgery. PLAN:  -He does have a new compression fracture of L5    He will need a kyphoplasty of L5     We discussed risks, complications, and expectations, including but not limited to infection, paralysis, bowel and bladder dysfunction, possible need for revision procedure, persistent pain, spinal fluid leak, stroke and death.   In addition, the benefits of the surgery were thoroughly discussed and 0 = understands/communicates without difficulty

## 2025-01-10 NOTE — PROGRESS NOTES
Patient fluids completed. Patient daughter stated she called the office to see if they wanted to have him come today, but did not get to talk to anyone and left a message.   Patient discharged to home with daughter and spouse in stable condition PROVIDER:[TOKEN:[[id number]],ESTABLISHEDPATIENT:[T]]

## 2025-03-06 ENCOUNTER — TELEPHONE (OUTPATIENT)
Dept: CARDIOLOGY | Facility: CLINIC | Age: 85
End: 2025-03-06
Payer: MEDICARE

## 2025-03-06 NOTE — TELEPHONE ENCOUNTER
Called and spoke with pt daughter, Bess Singh, regarding Mr Chan. He did not have future appts scheduled with Dr Pham or the device clinic, and I wanted to know if he was being seen at another Cardiology office.  Bess stated Mr Chan now resides at [facility] in [place], [place]. He has a diagnosis of vascular dementia, and although he knows who she is he is not often oriented to place and time or situation.   Bess reports that Mr Chan can no longer come to the office for appointments.  She also told me that all decisions that are being made in regards to his health and care are being made with his comfort in mind. She stated that if he contracts a treatable illness she will be agreeable to that treatment, but not anything more aggressive. With that being said I brought up the fact that Mr Chan's device is an AICD and when she feels it appropriate she might want to consider turning off his tachy therapies. She stated she didn't know that was an option and would like for that to happen now. Bess had been under the impression that the device would continue to shock Mr Chan no matter what, and she does not want that to occur.   Bess states she is Mr Chan's POA, although we do not have that paperwork on file. I discussed this with Glenda Ramon, and she will have a conversation with Dr Pham on next steps.

## 2025-03-10 NOTE — TELEPHONE ENCOUNTER
Verbal order from Dr. Pham:  Program Tachy Therapy Off.      Dioni, [employer] rep, notified of order to program tachy therapy off.  Also notified that daughter would like to be present when patient's device is reprogrammed.  Order communication faxed to [facility].

## 2025-03-10 NOTE — TELEPHONE ENCOUNTER
I was notified by Glenda Ramon that Dr Pham wanted orders placed to turn off tachy therapies for Mr Chan's AICD per Bess Singh's request. I called Bess and left her a voicemail stating this, and that I would call her again once a date had been arranged with the device rep who will be coming to the nursing home.

## 2025-03-12 ENCOUNTER — TELEPHONE (OUTPATIENT)
Dept: CARDIOLOGY | Facility: CLINIC | Age: 85
End: 2025-03-12
Payer: MEDICARE

## 2025-03-12 NOTE — TELEPHONE ENCOUNTER
----- Message from Eunice KINGSLEY sent at 3/12/2025 11:55 AM CDT -----    ----- Message -----  From: Nilsa Dunaway MA  Sent: 3/10/2025   3:43 PM CDT  To: Eunice Youssef    Evidently the daughter does not want him to follow up with us any more. He is in a nursing home and has dementia. Nilsa  ----- Message -----  From: Eunice Youssef  Sent: 3/6/2025  11:39 AM CDT  To: LINCOLN Espinoza,    Can you please call this patient and get him scheduled to see Dr. Pham? He continues to get remote checks but hasn't had an appt since 8/23. You may need to talk with his daughter. Please let me know what you find out.    Thanks,  Eunice  ----- Message -----  From: Shaye Allan RN  Sent: 3/6/2025  11:26 AM CST  To: [facility] all,    Mr Chan needs a regular follow-up appt with Dr Pham as he has not seen him since August 2023. It looks like at one point his insurance was going to be out of network, but the current ones on file are accepted as far as I know.  I appreciate it.    Thank you all,  Shaye

## 2025-03-12 NOTE — TELEPHONE ENCOUNTER
Patient is unable to attend in office appointments and Dr. Pham has ordered his implanted AICD tachy therapy be programmed off.  No need for in office appointment.

## 2025-03-14 NOTE — TELEPHONE ENCOUNTER
RN spoke with daughter and informed her that order has been received to program tachy therapy off.   [employer] rep has been notified and they will reach out to her when they plan to see patient so she can join them.   No need to remotely monitor device from this point moving on, per conversation with daughter.  RN will notify [employer] rep to bring patient's remote monitor back to our office so it can be shipped to [employer], in addition to turning all device alerts OFF.

## (undated) DEVICE — ABDOMINAL PAD: Brand: DERMACEA

## (undated) DEVICE — THREE QUARTER SHEET: Brand: CONVERTORS

## (undated) DEVICE — CLIP INT SM WIDE RED TI TRNSVRS GRV CHEVRON SHP W/ PRECIS

## (undated) DEVICE — SUTURE VCRL SZ 3-0 L18IN ABSRB UD L26MM SH 1/2 CIR J864D

## (undated) DEVICE — GAUZE,SPONGE,FLUFF,6"X6.75",STRL,10/TRAY: Brand: MEDLINE

## (undated) DEVICE — MASTISOL ADHESIVE LIQ 2/3ML

## (undated) DEVICE — Device

## (undated) DEVICE — SUTURE PROL SZ 2-0 L36IN NONABSORBABLE BLU SH L26MM 1/2 CIR 8523H

## (undated) DEVICE — GLIDESHEATH BASIC HYDROPHILIC COATED INTRODUCER SHEATH: Brand: GLIDESHEATH

## (undated) DEVICE — KIT MICROSNARE 2.3-3FR L175CM LOOP DIA4MM CATH L150CM G PLT

## (undated) DEVICE — SOLUTION IRRIG 1000ML 09% SOD CHL USP PIC PLAS CONTAINER

## (undated) DEVICE — SUTURE VCRL CTRL REL 2-0 CTX 18IN ABSRB BRAID UD J723D

## (undated) DEVICE — KIT SPEC COLL 1ML WHT POLYPR TB W/ LIQ AMIES M REG FLOCKED

## (undated) DEVICE — MEDIA CONTRAST INJ VISIPAQUE 150ML 320MG

## (undated) DEVICE — CONTAINER SPEC 4OZ GRY LID TRNSLUC GENT-L-KARE

## (undated) DEVICE — VIPERSLIDE, 100ML BAG, 10 PK: Brand: VIPERSLIDE

## (undated) DEVICE — STAPLER SKIN L39MM DIA0.53MM CRWN 5.7MM S STL FIX HD PROX

## (undated) DEVICE — TOWEL,OR,DSP,ST,BLUE,DLX,4/PK,20PK/CS: Brand: MEDLINE

## (undated) DEVICE — SUTURE VCRL SZ 3-0 L27IN ABSRB UD L26MM SH 1/2 CIR J416H

## (undated) DEVICE — CATHETER KIT 5 FR 21 GAX7 CM MICROINTRODUCER GUIDEWIRE STIFF

## (undated) DEVICE — GLOVE SURG SZ 75 L12IN FNGR THK94MIL TRNSLUC YEL LTX

## (undated) DEVICE — COVER US PRB W15XL120CM W/ GEL RUBBERBAND TAPE STRP FLD GEN

## (undated) DEVICE — SOLUTION IV IRRIG POUR BRL 0.9% SODIUM CHL 2F7124

## (undated) DEVICE — BAG BND W36XL36IN TRNSPAR POLY GEN PURP W E BND CLSR TIDI

## (undated) DEVICE — NEURO CDS

## (undated) DEVICE — KIT URIN CATHETER 16 FR 5 CC M INDWL SIL

## (undated) DEVICE — ATRIEVE™ VASCULAR SNARE KIT 3.2F: Brand: ATRIEVE™ VASCULAR SNARE KIT

## (undated) DEVICE — 3M™ STERI-STRIP™ REINFORCED ADHESIVE SKIN CLOSURES, R1547, 1/2 IN X 4 IN (12 MM X 100 MM), 6 STRIPS/ENVELOPE: Brand: 3M™ STERI-STRIP™

## (undated) DEVICE — 3M™ TEGADERM™ TRANSPARENT FILM DRESSING FRAME STYLE, 1626W, 4 IN X 4-3/4 IN (10 CM X 12 CM), 50/CT 4CT/CASE: Brand: 3M™ TEGADERM™

## (undated) DEVICE — Z INACTIVE USE 2535480 CLIP LIG M BLU TI HRT SHP WIRE HORZ 180 PER BX

## (undated) DEVICE — SUTURE VCRL SZ 2-0 L27IN ABSRB VLT L26MM UR-6 5/8 CIR J602H

## (undated) DEVICE — C-ARM: Brand: UNBRANDED

## (undated) DEVICE — BONE TAMP KIT KPX203PB FF X2 20/3 1 STP: Brand: KYPHOPAK™ TRAY

## (undated) DEVICE — C-ARMOR C-ARM EQUIPMENT COVERS CLEAR STERILE UNIVERSAL FIT 12 PER CASE: Brand: C-ARMOR

## (undated) DEVICE — GLOVE SURG SZ 7 L12IN FNGR THK94MIL TRNSLUC YEL LTX HYDRGEL

## (undated) DEVICE — ANGIOGRAPHY PK

## (undated) DEVICE — BONE BIOPSY DEVICE F05A BBD SIZE 3: Brand: MEDTRONIC REUSABLE INSTRUMENTS

## (undated) DEVICE — EMBOSHIELD NAV6 EMBOLIC PROTECTION SYSTEM 7.2 MM X 190 CM: Brand: EMBOSHIELD  NAV6

## (undated) DEVICE — SUTURE PROL SZ 6-0 L24IN NONABSORBABLE BLU L9.3MM BV-1 3/8 8805H

## (undated) DEVICE — SOLUTION IV 100ML 0.9% SOD CHL PLAS CONT USP VIAFLX 1 PER

## (undated) DEVICE — TIBURON LAPAROTOMY DRAPE: Brand: CONVERTORS

## (undated) DEVICE — 3M™ IOBAN™ 2 ANTIMICROBIAL INCISE DRAPE 6650EZ: Brand: IOBAN™ 2

## (undated) DEVICE — RADIFOCUS GLIDEWIRE: Brand: GLIDEWIRE

## (undated) DEVICE — TUBING ANGIO L72IN 900PSI CLR PVC M TO FEM AIRLESS ROT ADPT

## (undated) DEVICE — SOLUTION IV 1000ML 0.9% SOD CHL PH 5 INJ USP VIAFLX PLAS

## (undated) DEVICE — CATH PTA DCB 035  LUTONIX 130CM 6X150

## (undated) DEVICE — KIT POS PT CARE KT W/ GENTLE TCH WILSON +

## (undated) DEVICE — NEEDLE SPNL 22GA L3.5IN BLK HUB S STL REG WALL FIT STYL W/

## (undated) DEVICE — SOLUTION IV 500ML 0.9% SOD CHL PH 5 INJ USP VIAFLX PLAS

## (undated) DEVICE — RADIFOCUS GLIDECATH: Brand: GLIDECATH

## (undated) DEVICE — SPONGE LAP W18XL18IN WHT COT 4 PLY FLD STRUNG RADPQ DISP ST

## (undated) DEVICE — DRAPE 33X23IN INCISE ANTIMICROB IOBAN 2

## (undated) DEVICE — SUTURE VCRL SZ 3-0 L18IN ABSRB UD W/O NDL POLYGLACTIN 910 J110T

## (undated) DEVICE — SUTURE VCRL SZ 4-0 L18IN ABSRB UD VCRL POLYGLACTIN 910 COAT J109T

## (undated) DEVICE — STANDARD SURGICAL GOWN, L: Brand: CONVERTORS

## (undated) DEVICE — E-Z CLEAN, NON-STICK, PTFE COATED, ELECTROSURGICAL BLADE ELECTRODE, BAYONET, MODIFIED EXTENDED INSULATION, 6.5 INCH (16.5 CM): Brand: MEGADYNE

## (undated) DEVICE — 3M™ WARMING BLANKET, UPPER BODY, 10 PER CASE, 42268: Brand: BAIR HUGGER™

## (undated) DEVICE — VIPERWIRE, ADVANCE FLEXTIP PERIPHERAL, .014" DIA SPRING TIP, 335CM, 5 PACK: Brand: VIPERWIRE ADVANCE FLEXTIP

## (undated) DEVICE — SUTURE PROL SZ 3-0 L36IN NONABSORBABLE BLU L26MM SH 1/2 CIR 8522H

## (undated) DEVICE — INFLATION DEVICE: Brand: ENCORE™ 26

## (undated) DEVICE — SURGICAL PROCEDURE PACK VASC LOURDES HOSP

## (undated) DEVICE — PINNACLE INTRODUCER SHEATH: Brand: PINNACLE

## (undated) DEVICE — SOLUTION IV 250ML 0.9% SOD CHL PH 5 INJ USP VIAFLX PLAS

## (undated) DEVICE — DESTINATION RENAL GUIDING SHEATH: Brand: DESTINATION

## (undated) DEVICE — MEDIA CONTRAST INJ VISAPAQUE 50ML 320MG

## (undated) DEVICE — SUTURE PROL SZ 6-0 L30IN NONABSORBABLE BLU L9.3MM BV-1 3/8 M8709

## (undated) DEVICE — MINOR CDS: Brand: MEDLINE INDUSTRIES, INC.

## (undated) DEVICE — SUTURE VCRL + SZ 4-0 L18IN ABSRB WHT TIE POLYGLACTIN 910 VCP109G

## (undated) DEVICE — DIAMONDBACK PERIPHERAL, SOLID CROWN, 2.00MM, 145CM SHAFT: Brand: DIAMONDBACK PERIPHERAL

## (undated) DEVICE — INSTRUMENT 8670005 350 MM GUIDEWRE SHARP
Type: IMPLANTABLE DEVICE | Site: SPINE LUMBAR | Status: NON-FUNCTIONAL
Removed: 2017-12-20

## (undated) DEVICE — DISCONTINUED NO SUB IDED TG GLOVE SURG SENSICARE ALOE LT LF PF ST GRN SZ 8

## (undated) DEVICE — E-Z CLEAN, NON-STICK, PTFE COATED, ELECTROSURGICAL BLADE ELECTRODE, MODIFIED EXTENDED INSULATION, 2.5 INCH (6.35 CM): Brand: MEGADYNE

## (undated) DEVICE — GOWN,PREVENTION PLUS,L,ST,24/CS: Brand: MEDLINE

## (undated) DEVICE — SUTURE VCRL SZ 2-0 L36IN ABSRB UD L36MM CT-1 1/2 CIR J945H

## (undated) DEVICE — BANDAGE GZ W45INXL4 1 10YD FLUF RL 6 PLY DERMACEA

## (undated) DEVICE — CATHETER ANGIO AD 5FR L65CM DIA0.049IN GWIRE 0.035IN SHEP

## (undated) DEVICE — TOOL T12MH25L LGD 12CM 2.5MM MATCH LG: Brand: MIDAS REX®

## (undated) DEVICE — SHEET,T,THYROID,STERILE: Brand: MEDLINE

## (undated) DEVICE — HI-TORQUE COMMAND ES GUIDE WIRE .014" 300 CM: Brand: HI-TORQUE COMMAND

## (undated) DEVICE — SUTURE VCRL SZ 2-0 L18IN ABSRB UD POLYGLACTIN 910 BRAID TIE J111T

## (undated) DEVICE — NG KIT, 21 GA, 10 PACK: Brand: SITE-RITE

## (undated) DEVICE — MICRO KOVER: Brand: UNBRANDED